# Patient Record
Sex: MALE | Race: WHITE | NOT HISPANIC OR LATINO | Employment: OTHER | URBAN - METROPOLITAN AREA
[De-identification: names, ages, dates, MRNs, and addresses within clinical notes are randomized per-mention and may not be internally consistent; named-entity substitution may affect disease eponyms.]

---

## 2017-10-04 ENCOUNTER — TELEPHONE (OUTPATIENT)
Dept: FAMILY MEDICINE CLINIC | Facility: CLINIC | Age: 77
End: 2017-10-04

## 2017-10-04 RX ORDER — LISINOPRIL AND HYDROCHLOROTHIAZIDE 20; 12.5 MG/1; MG/1
1 TABLET ORAL DAILY
Qty: 90 TABLET | Refills: 3 | Status: SHIPPED | OUTPATIENT
Start: 2017-10-04 | End: 2018-10-01 | Stop reason: SDUPTHER

## 2017-10-04 NOTE — TELEPHONE ENCOUNTER
----- Message from Regine Paulson sent at 10/4/2017  9:18 AM EDT -----  Pt sees dr. GONZALEZ    Pt has appt with him next week but will be 4 days short on one of his meds.  Can he please get a new rx for lisinopril-hydrochlorothiazide (PRINZIDE,ZESTORETIC) 20-12.5 MG per tablet 90 tablet 3 10/4/2016      Sig - Route: Take 1 tablet by mouth Daily. - Oral    [facility] to [facility] [address].  thanks

## 2017-10-12 ENCOUNTER — OFFICE VISIT (OUTPATIENT)
Dept: FAMILY MEDICINE CLINIC | Facility: CLINIC | Age: 77
End: 2017-10-12

## 2017-10-12 ENCOUNTER — APPOINTMENT (OUTPATIENT)
Dept: LAB | Facility: HOSPITAL | Age: 77
End: 2017-10-12

## 2017-10-12 VITALS
BODY MASS INDEX: 32.87 KG/M2 | SYSTOLIC BLOOD PRESSURE: 130 MMHG | WEIGHT: 234.8 LBS | OXYGEN SATURATION: 98 % | HEART RATE: 70 BPM | HEIGHT: 71 IN | DIASTOLIC BLOOD PRESSURE: 90 MMHG

## 2017-10-12 DIAGNOSIS — K21.9 GASTROESOPHAGEAL REFLUX DISEASE, ESOPHAGITIS PRESENCE NOT SPECIFIED: ICD-10-CM

## 2017-10-12 DIAGNOSIS — Z12.5 PROSTATE CANCER SCREENING: ICD-10-CM

## 2017-10-12 DIAGNOSIS — R73.09 ELEVATED GLUCOSE LEVEL: ICD-10-CM

## 2017-10-12 DIAGNOSIS — M88.9 PAGET'S DISEASE OF BONE: ICD-10-CM

## 2017-10-12 DIAGNOSIS — E03.8 OTHER SPECIFIED HYPOTHYROIDISM: ICD-10-CM

## 2017-10-12 DIAGNOSIS — I10 ESSENTIAL HYPERTENSION, BENIGN: ICD-10-CM

## 2017-10-12 DIAGNOSIS — M15.9 GENERALIZED OSTEOARTHROSIS, INVOLVING MULTIPLE SITES: ICD-10-CM

## 2017-10-12 DIAGNOSIS — E78.5 HYPERLIPIDEMIA, UNSPECIFIED HYPERLIPIDEMIA TYPE: Primary | ICD-10-CM

## 2017-10-12 LAB
ALBUMIN SERPL-MCNC: 4.7 G/DL (ref 3.2–4.8)
ALBUMIN/GLOB SERPL: 1.7 G/DL (ref 1.5–2.5)
ALP SERPL-CCNC: 77 U/L (ref 25–100)
ALT SERPL W P-5'-P-CCNC: 38 U/L (ref 7–40)
ANION GAP SERPL CALCULATED.3IONS-SCNC: 6 MMOL/L (ref 3–11)
ARTICHOKE IGE QN: 185 MG/DL (ref 0–130)
AST SERPL-CCNC: 25 U/L (ref 0–33)
BILIRUB SERPL-MCNC: 0.8 MG/DL (ref 0.3–1.2)
BUN BLD-MCNC: 17 MG/DL (ref 9–23)
BUN/CREAT SERPL: 17 (ref 7–25)
CALCIUM SPEC-SCNC: 9.6 MG/DL (ref 8.7–10.4)
CHLORIDE SERPL-SCNC: 96 MMOL/L (ref 99–109)
CHOLEST SERPL-MCNC: 227 MG/DL (ref 0–200)
CO2 SERPL-SCNC: 36 MMOL/L (ref 20–31)
CREAT BLD-MCNC: 1 MG/DL (ref 0.6–1.3)
GFR SERPL CREATININE-BSD FRML MDRD: 72 ML/MIN/1.73
GLOBULIN UR ELPH-MCNC: 2.8 GM/DL
GLUCOSE BLD-MCNC: 149 MG/DL (ref 70–100)
HBA1C MFR BLD: 8.1 % (ref 4.8–5.6)
HDLC SERPL-MCNC: 42 MG/DL (ref 40–60)
POTASSIUM BLD-SCNC: 4.9 MMOL/L (ref 3.5–5.5)
PROT SERPL-MCNC: 7.5 G/DL (ref 5.7–8.2)
PSA SERPL-MCNC: 6.34 NG/ML (ref 0–4)
SODIUM BLD-SCNC: 138 MMOL/L (ref 132–146)
T4 FREE SERPL-MCNC: 2.1 NG/DL (ref 0.89–1.76)
TRIGL SERPL-MCNC: 186 MG/DL (ref 0–150)
TSH SERPL DL<=0.05 MIU/L-ACNC: 0.76 MIU/ML (ref 0.35–5.35)

## 2017-10-12 PROCEDURE — 83036 HEMOGLOBIN GLYCOSYLATED A1C: CPT | Performed by: FAMILY MEDICINE

## 2017-10-12 PROCEDURE — 99214 OFFICE O/P EST MOD 30 MIN: CPT | Performed by: FAMILY MEDICINE

## 2017-10-12 PROCEDURE — 84439 ASSAY OF FREE THYROXINE: CPT | Performed by: FAMILY MEDICINE

## 2017-10-12 PROCEDURE — 84443 ASSAY THYROID STIM HORMONE: CPT | Performed by: FAMILY MEDICINE

## 2017-10-12 PROCEDURE — G0103 PSA SCREENING: HCPCS | Performed by: FAMILY MEDICINE

## 2017-10-12 PROCEDURE — 80053 COMPREHEN METABOLIC PANEL: CPT | Performed by: FAMILY MEDICINE

## 2017-10-12 PROCEDURE — 80061 LIPID PANEL: CPT | Performed by: FAMILY MEDICINE

## 2017-10-12 PROCEDURE — 36415 COLL VENOUS BLD VENIPUNCTURE: CPT | Performed by: FAMILY MEDICINE

## 2017-10-12 RX ORDER — LEVOTHYROXINE SODIUM 0.15 MG/1
150 TABLET ORAL DAILY
Qty: 90 TABLET | Refills: 3 | Status: SHIPPED | OUTPATIENT
Start: 2017-10-12 | End: 2018-10-01 | Stop reason: SDUPTHER

## 2017-10-12 RX ORDER — PROPRANOLOL HYDROCHLORIDE 80 MG/1
80 CAPSULE, EXTENDED RELEASE ORAL DAILY
Qty: 90 CAPSULE | Refills: 3 | Status: SHIPPED | OUTPATIENT
Start: 2017-10-12 | End: 2018-10-01 | Stop reason: SDUPTHER

## 2017-10-12 RX ORDER — OMEPRAZOLE 40 MG/1
40 CAPSULE, DELAYED RELEASE ORAL DAILY
Qty: 90 CAPSULE | Refills: 3 | Status: SHIPPED | OUTPATIENT
Start: 2017-10-12 | End: 2018-10-01 | Stop reason: SDUPTHER

## 2017-10-12 NOTE — PROGRESS NOTES
Subjective   Vinnie Fulton is a 77 y.o. male    HPI Comments: Patient presents today for recheck regarding hypertension, hyperlipidemia, GERD, hypothyroidism, impaired glucose tolerance and Paget's disease of the bone.  He is compliant with his medications and reports no adverse effects associated with the medicines.  He is fasting for laboratory studies today.  He and his wife are leaving in a few weeks to spend the winter in [place].  He reports that he generally feels well and is able to do whatever he wants to do.  His son and daughter-in-law just had a new baby.    Hypertension   Pertinent negatives include no chest pain, headaches, palpitations or shortness of breath.   Hyperlipidemia   Pertinent negatives include no chest pain, myalgias or shortness of breath.   Heartburn   He reports no abdominal pain, no chest pain, no coughing, no nausea or no wheezing. Pertinent negatives include no fatigue.   Hyperthyroidism   Pertinent negatives include no abdominal pain, arthralgias, chest pain, chills, coughing, fatigue, fever, headaches, myalgias, nausea, rash, vomiting or weakness.       The following portions of the patient's history were reviewed and updated as appropriate: allergies, current medications, past social history and problem list    Review of Systems   Constitutional: Negative for appetite change, chills, fatigue, fever and unexpected weight change.   Eyes: Negative for visual disturbance.   Respiratory: Negative for cough, chest tightness, shortness of breath and wheezing.    Cardiovascular: Negative for chest pain, palpitations and leg swelling.   Gastrointestinal: Negative for abdominal distention, abdominal pain, constipation, diarrhea, nausea and vomiting.        Patient experiencing heartburn/acid reflux     Endocrine: Negative for cold intolerance, heat intolerance, polydipsia, polyphagia and polyuria.   Genitourinary: Negative for dysuria, frequency and urgency.   Musculoskeletal: Negative for  arthralgias, back pain and myalgias.   Skin: Negative for color change and rash.   Neurological: Negative for dizziness, tremors, syncope, weakness and headaches.   Hematological: Negative for adenopathy. Does not bruise/bleed easily.   Psychiatric/Behavioral: Negative for agitation. The patient is not nervous/anxious.        Objective     Vitals:    10/12/17 1335   BP: 130/90   Pulse: 70   SpO2: 98%       Physical Exam   Constitutional: He is oriented to person, place, and time. He appears well-developed and well-nourished.   HENT:   Head: Normocephalic.   Mouth/Throat: Oropharynx is clear and moist.   No Exopthalmos   Eyes: Conjunctivae are normal. Pupils are equal, round, and reactive to light.   Neck: Neck supple. No JVD present. No thyromegaly present.   Cardiovascular: Normal rate, regular rhythm, normal heart sounds, intact distal pulses and normal pulses.    No murmur heard.  Pulmonary/Chest: Effort normal and breath sounds normal. No respiratory distress.   Abdominal: Soft. Bowel sounds are normal. There is no hepatosplenomegaly. There is no tenderness.   Musculoskeletal: He exhibits no edema.   Lymphadenopathy:     He has no cervical adenopathy.   Neurological: He is alert and oriented to person, place, and time.   Skin: Skin is warm and dry. No rash noted.   Psychiatric: He has a normal mood and affect. His behavior is normal.   Nursing note and vitals reviewed.      Assessment/Plan   Problem List Items Addressed This Visit        Cardiovascular and Mediastinum    Essential hypertension, benign    Relevant Medications    propranolol LA (INDERAL LA) 80 MG 24 hr capsule    Other Relevant Orders    Comprehensive Metabolic Panel    Hyperlipidemia - Primary    Relevant Orders    Comprehensive Metabolic Panel    Lipid Panel       Digestive    Esophageal reflux    Relevant Medications    omeprazole (priLOSEC) 40 MG capsule       Endocrine    Hypothyroidism    Relevant Medications    levothyroxine (SYNTHROID,  LEVOTHROID) 150 MCG tablet    propranolol LA (INDERAL LA) 80 MG 24 hr capsule    Other Relevant Orders    TSH    T4, Free       Musculoskeletal and Integument    Paget's disease of bone    Relevant Orders    Comprehensive Metabolic Panel    Generalized osteoarthrosis, involving multiple sites      Other Visit Diagnoses     Elevated glucose level        Relevant Orders    Comprehensive Metabolic Panel    Hemoglobin A1c    Prostate cancer screening        Relevant Orders    PSA Screen

## 2017-11-19 RX ORDER — METFORMIN HYDROCHLORIDE EXTENDED-RELEASE TABLETS 1000 MG/1
1000 TABLET, FILM COATED, EXTENDED RELEASE ORAL
Qty: 90 TABLET | Refills: 3 | Status: SHIPPED | OUTPATIENT
Start: 2017-11-19 | End: 2018-10-01

## 2018-10-01 ENCOUNTER — LAB (OUTPATIENT)
Dept: LAB | Facility: HOSPITAL | Age: 78
End: 2018-10-01

## 2018-10-01 ENCOUNTER — OFFICE VISIT (OUTPATIENT)
Dept: FAMILY MEDICINE CLINIC | Facility: CLINIC | Age: 78
End: 2018-10-01

## 2018-10-01 VITALS
SYSTOLIC BLOOD PRESSURE: 130 MMHG | TEMPERATURE: 98.1 F | BODY MASS INDEX: 32.9 KG/M2 | DIASTOLIC BLOOD PRESSURE: 98 MMHG | HEIGHT: 71 IN | WEIGHT: 235 LBS | HEART RATE: 59 BPM | OXYGEN SATURATION: 96 %

## 2018-10-01 DIAGNOSIS — E78.5 HYPERLIPIDEMIA, UNSPECIFIED HYPERLIPIDEMIA TYPE: ICD-10-CM

## 2018-10-01 DIAGNOSIS — Z12.5 PROSTATE CANCER SCREENING: ICD-10-CM

## 2018-10-01 DIAGNOSIS — K21.9 GASTROESOPHAGEAL REFLUX DISEASE, ESOPHAGITIS PRESENCE NOT SPECIFIED: ICD-10-CM

## 2018-10-01 DIAGNOSIS — R73.09 ELEVATED GLUCOSE LEVEL: ICD-10-CM

## 2018-10-01 DIAGNOSIS — M88.9 PAGET'S DISEASE OF BONE: ICD-10-CM

## 2018-10-01 DIAGNOSIS — M15.9 GENERALIZED OSTEOARTHROSIS, INVOLVING MULTIPLE SITES: ICD-10-CM

## 2018-10-01 DIAGNOSIS — I10 ESSENTIAL HYPERTENSION, BENIGN: ICD-10-CM

## 2018-10-01 DIAGNOSIS — Z51.81 MEDICATION MONITORING ENCOUNTER: ICD-10-CM

## 2018-10-01 DIAGNOSIS — E03.8 OTHER SPECIFIED HYPOTHYROIDISM: ICD-10-CM

## 2018-10-01 DIAGNOSIS — Z00.00 MEDICARE ANNUAL WELLNESS VISIT, SUBSEQUENT: Primary | ICD-10-CM

## 2018-10-01 LAB
ALBUMIN SERPL-MCNC: 4.74 G/DL (ref 3.2–4.8)
ALBUMIN/GLOB SERPL: 2.1 G/DL (ref 1.5–2.5)
ALP SERPL-CCNC: 67 U/L (ref 25–100)
ALT SERPL W P-5'-P-CCNC: 30 U/L (ref 7–40)
ANION GAP SERPL CALCULATED.3IONS-SCNC: 9 MMOL/L (ref 3–11)
ARTICHOKE IGE QN: 174 MG/DL (ref 0–130)
AST SERPL-CCNC: 28 U/L (ref 0–33)
BILIRUB SERPL-MCNC: 0.8 MG/DL (ref 0.3–1.2)
BUN BLD-MCNC: 19 MG/DL (ref 9–23)
BUN/CREAT SERPL: 17.8 (ref 7–25)
CALCIUM SPEC-SCNC: 9.6 MG/DL (ref 8.7–10.4)
CHLORIDE SERPL-SCNC: 96 MMOL/L (ref 99–109)
CHOLEST SERPL-MCNC: 222 MG/DL (ref 0–200)
CO2 SERPL-SCNC: 34 MMOL/L (ref 20–31)
CREAT BLD-MCNC: 1.07 MG/DL (ref 0.6–1.3)
DEPRECATED RDW RBC AUTO: 43.6 FL (ref 37–54)
ERYTHROCYTE [DISTWIDTH] IN BLOOD BY AUTOMATED COUNT: 13 % (ref 11.3–14.5)
GFR SERPL CREATININE-BSD FRML MDRD: 67 ML/MIN/1.73
GLOBULIN UR ELPH-MCNC: 2.3 GM/DL
GLUCOSE BLD-MCNC: 168 MG/DL (ref 70–100)
HBA1C MFR BLD: 8 % (ref 4.8–5.6)
HCT VFR BLD AUTO: 45.8 % (ref 38.9–50.9)
HDLC SERPL-MCNC: 40 MG/DL (ref 40–60)
HGB BLD-MCNC: 15.4 G/DL (ref 13.1–17.5)
MCH RBC QN AUTO: 31 PG (ref 27–31)
MCHC RBC AUTO-ENTMCNC: 33.6 G/DL (ref 32–36)
MCV RBC AUTO: 92.3 FL (ref 80–99)
PLATELET # BLD AUTO: 189 10*3/MM3 (ref 150–450)
PMV BLD AUTO: 11.5 FL (ref 6–12)
POTASSIUM BLD-SCNC: 4.7 MMOL/L (ref 3.5–5.5)
PROT SERPL-MCNC: 7 G/DL (ref 5.7–8.2)
PSA SERPL-MCNC: 6.34 NG/ML (ref 0–4)
RBC # BLD AUTO: 4.96 10*6/MM3 (ref 4.2–5.76)
SODIUM BLD-SCNC: 139 MMOL/L (ref 132–146)
T4 FREE SERPL-MCNC: 1.67 NG/DL (ref 0.89–1.76)
TRIGL SERPL-MCNC: 208 MG/DL (ref 0–150)
TSH SERPL DL<=0.05 MIU/L-ACNC: 0.79 MIU/ML (ref 0.35–5.35)
WBC NRBC COR # BLD: 8.31 10*3/MM3 (ref 3.5–10.8)

## 2018-10-01 PROCEDURE — 84443 ASSAY THYROID STIM HORMONE: CPT

## 2018-10-01 PROCEDURE — 80053 COMPREHEN METABOLIC PANEL: CPT

## 2018-10-01 PROCEDURE — 36415 COLL VENOUS BLD VENIPUNCTURE: CPT

## 2018-10-01 PROCEDURE — 84439 ASSAY OF FREE THYROXINE: CPT

## 2018-10-01 PROCEDURE — G0103 PSA SCREENING: HCPCS

## 2018-10-01 PROCEDURE — 83036 HEMOGLOBIN GLYCOSYLATED A1C: CPT

## 2018-10-01 PROCEDURE — 80061 LIPID PANEL: CPT

## 2018-10-01 PROCEDURE — G0439 PPPS, SUBSEQ VISIT: HCPCS | Performed by: FAMILY MEDICINE

## 2018-10-01 PROCEDURE — 85027 COMPLETE CBC AUTOMATED: CPT

## 2018-10-01 RX ORDER — OMEPRAZOLE 40 MG/1
40 CAPSULE, DELAYED RELEASE ORAL DAILY
Qty: 90 CAPSULE | Refills: 3 | Status: SHIPPED | OUTPATIENT
Start: 2018-10-01 | End: 2019-09-20 | Stop reason: SDUPTHER

## 2018-10-01 RX ORDER — LEVOTHYROXINE SODIUM 0.15 MG/1
150 TABLET ORAL DAILY
Qty: 90 TABLET | Refills: 3 | Status: SHIPPED | OUTPATIENT
Start: 2018-10-01 | End: 2019-09-20 | Stop reason: SDUPTHER

## 2018-10-01 RX ORDER — HEPATITIS A VACCINE 1440 [IU]/ML
INJECTION, SUSPENSION INTRAMUSCULAR
Refills: 0 | COMMUNITY
Start: 2018-09-12 | End: 2021-10-22

## 2018-10-01 RX ORDER — METFORMIN HYDROCHLORIDE 500 MG/1
1000 TABLET, EXTENDED RELEASE ORAL
Qty: 180 TABLET | Refills: 3 | Status: SHIPPED | OUTPATIENT
Start: 2018-10-01 | End: 2019-09-22 | Stop reason: SDUPTHER

## 2018-10-01 RX ORDER — METFORMIN HYDROCHLORIDE 500 MG/1
TABLET, EXTENDED RELEASE ORAL
Refills: 1 | COMMUNITY
Start: 2018-08-15 | End: 2018-10-01 | Stop reason: SDUPTHER

## 2018-10-01 RX ORDER — PROPRANOLOL HYDROCHLORIDE 80 MG/1
80 CAPSULE, EXTENDED RELEASE ORAL DAILY
Qty: 90 CAPSULE | Refills: 3 | Status: SHIPPED | OUTPATIENT
Start: 2018-10-01 | End: 2019-09-20 | Stop reason: SDUPTHER

## 2018-10-01 RX ORDER — LISINOPRIL AND HYDROCHLOROTHIAZIDE 20; 12.5 MG/1; MG/1
1 TABLET ORAL DAILY
Qty: 90 TABLET | Refills: 3 | Status: SHIPPED | OUTPATIENT
Start: 2018-10-01 | End: 2019-09-20 | Stop reason: SDUPTHER

## 2018-10-02 NOTE — PROGRESS NOTES
QUICK REFERENCE INFORMATION:  The ABCs of the Annual Wellness Visit    Subsequent Medicare Wellness Visit    HEALTH RISK ASSESSMENT    1940    Recent Hospitalizations:  No hospitalization(s) within the last year..        Current Medical Providers:  Patient Care Team:  Ivan Yoder MD as PCP - General  Ivan Yoder MD as PCP - Family Medicine  Ivan Yoder MD as PCP - Claims Attributed        Smoking Status:  History   Smoking Status   • Former Smoker   Smokeless Tobacco   • Former User       Alcohol Consumption:  History   Alcohol Use No       Depression Screen:   PHQ-2/PHQ-9 Depression Screening 10/1/2018   Little interest or pleasure in doing things 0   Feeling down, depressed, or hopeless 0   Total Score 0       Health Habits and Functional and Cognitive Screening:  Functional & Cognitive Status 10/1/2018   Do you have difficulty preparing food and eating? No   Do you have difficulty bathing yourself, getting dressed or grooming yourself? No   Do you have difficulty using the toilet? No   Do you have difficulty moving around from place to place? No   Do you have trouble with steps or getting out of a bed or a chair? No   In the past year have you fallen or experienced a near fall? No   Current Diet Well Balanced Diet   Dental Exam Not up to date   Eye Exam Up to date   Exercise (times per week) 3 times per week   Current Exercise Activities Include Walking   Do you need help using the phone?  No   Are you deaf or do you have serious difficulty hearing?  (No Data)   Do you need help with transportation? No   Do you need help shopping? No   Do you need help preparing meals?  No   Do you need help with housework?  No   Do you need help with laundry? No   Do you need help taking your medications? No   Do you need help managing money? No   Do you ever drive or ride in a car without wearing a seat belt? No           Does the patient have evidence of cognitive  impairment? No    Aspirin use counseling: Taking ASA appropriately as indicated      Recent Lab Results:  CMP:  Lab Results   Component Value Date    BUN 19 10/01/2018    CREATININE 1.07 10/01/2018    EGFRIFNONA 67 10/01/2018    BCR 17.8 10/01/2018     10/01/2018    K 4.7 10/01/2018    CO2 34.0 (H) 10/01/2018    CALCIUM 9.6 10/01/2018    ALBUMIN 4.74 10/01/2018    BILITOT 0.8 10/01/2018    ALKPHOS 67 10/01/2018    AST 28 10/01/2018    ALT 30 10/01/2018     Lipid Panel:  Lab Results   Component Value Date    CHOL 222 (H) 10/01/2018    TRIG 208 (H) 10/01/2018    HDL 40 10/01/2018     HbA1c:  Lab Results   Component Value Date    HGBA1C 8.00 (H) 10/01/2018       Visual Acuity:  No exam data present    Age-appropriate Screening Schedule:  Refer to the list below for future screening recommendations based on patient's age, sex and/or medical conditions. Orders for these recommended tests are listed in the plan section. The patient has been provided with a written plan.    Health Maintenance   Topic Date Due   • TDAP/TD VACCINES (1 - Tdap) 07/10/1959   • ZOSTER VACCINE (1 of 2) 07/10/1990   • PNEUMOCOCCAL VACCINES (65+ LOW/MEDIUM RISK) (1 of 2 - PCV13) 07/10/2005   • INFLUENZA VACCINE  08/01/2018   • LIPID PANEL  10/12/2018        Subjective   History of Present Illness    Vinnie Fulton is a 78 y.o. male who presents for an Subsequent Wellness Visit.    The following portions of the patient's history were reviewed and updated as appropriate: allergies, current medications, past family history, past medical history, past social history, past surgical history and problem list.    Outpatient Medications Prior to Visit   Medication Sig Dispense Refill   • ascorbic acid (VITAMIN C) 1000 MG tablet Take 1 tablet by mouth Daily.  1   • aspirin 81 MG EC tablet Take 81 mg by mouth daily.     • levothyroxine (SYNTHROID, LEVOTHROID) 150 MCG tablet Take 1 tablet by mouth Daily. 90 tablet 3   • lisinopril-hydrochlorothiazide  "(PRINZIDE,ZESTORETIC) 20-12.5 MG per tablet Take 1 tablet by mouth Daily. 90 tablet 3   • omeprazole (priLOSEC) 40 MG capsule Take 1 capsule by mouth Daily. 90 capsule 3   • propranolol LA (INDERAL LA) 80 MG 24 hr capsule Take 1 capsule by mouth Daily. 90 capsule 3   • metFORMIN (FORTAMET) 1000 MG (OSM) 24 hr tablet Take 1 tablet by mouth Daily With Breakfast. 90 tablet 3     No facility-administered medications prior to visit.        Patient Active Problem List   Diagnosis   • Paget's disease of bone   • Esophageal reflux   • Essential hypertension, benign   • Allergic rhinitis   • Generalized osteoarthrosis, involving multiple sites   • Hyperlipidemia   • Hypothyroidism   • Herpes zoster   • Cellulitis and abscess       Advance Care Planning:  has an advance directive - a copy HAS NOT been provided. Have asked the patient to send this to us to add to record.    Identification of Risk Factors:  Risk factors include: cardiovascular risk.    Review of Systems    Compared to one year ago, the patient feels his physical health is the same.  Compared to one year ago, the patient feels his mental health is the same.    Objective     Physical Exam     LEEROY: right side of prostate quite firm compared to left. No distinct nodules or masses.    Vitals:    10/01/18 0913   BP: 130/98   Pulse: 59   Temp: 98.1 °F (36.7 °C)   SpO2: 96%   Weight: 107 kg (235 lb)   Height: 180.3 cm (70.98\")       Patient's Body mass index is 32.79 kg/m². BMI is above normal parameters. Recommendations include: nutrition counseling.      Assessment/Plan   Patient Self-Management and Personalized Health Advice  The patient has been provided with information about: diet, weight management and prevention of cardiac or vascular disease and preventive services including:   · Counseling for cardiovascular disease risk reduction, Nutrition counseling provided.    Visit Diagnoses:    ICD-10-CM ICD-9-CM   1. Medicare annual wellness visit, subsequent Z00.00 " V70.0   2. Prostate cancer screening Z12.5 V76.44   3. Hyperlipidemia, unspecified hyperlipidemia type E78.5 272.4   4. Generalized osteoarthrosis, involving multiple sites M15.9 715.09   5. Paget's disease of bone M88.9 731.0   6. Other specified hypothyroidism E03.8 244.8   7. Essential hypertension, benign I10 401.1   8. Gastroesophageal reflux disease, esophagitis presence not specified K21.9 530.81   9. Medication monitoring encounter Z51.81 V58.83   10. Elevated glucose level R73.09 790.29       Orders Placed This Encounter   Procedures   • CBC (No Diff)     Standing Status:   Future     Number of Occurrences:   1     Standing Expiration Date:   10/1/2019   • Comprehensive Metabolic Panel     Standing Status:   Future     Number of Occurrences:   1     Standing Expiration Date:   10/1/2019   • Hemoglobin A1c     Standing Status:   Future     Number of Occurrences:   1     Standing Expiration Date:   10/1/2019   • Lipid Panel     Standing Status:   Future     Number of Occurrences:   1     Standing Expiration Date:   10/1/2019   • PSA Screen     Standing Status:   Future     Number of Occurrences:   1     Standing Expiration Date:   10/1/2019   • TSH     Standing Status:   Future     Number of Occurrences:   1     Standing Expiration Date:   10/1/2019   • T4, Free     Standing Status:   Future     Number of Occurrences:   1     Standing Expiration Date:   10/1/2019       Outpatient Encounter Prescriptions as of 10/1/2018   Medication Sig Dispense Refill   • ascorbic acid (VITAMIN C) 1000 MG tablet Take 1 tablet by mouth Daily.  1   • aspirin 81 MG EC tablet Take 81 mg by mouth daily.     • HAVRIX 1440 EL U/ML vaccine ADM 1ML IM UTD  0   • levothyroxine (SYNTHROID, LEVOTHROID) 150 MCG tablet Take 1 tablet by mouth Daily. 90 tablet 3   • lisinopril-hydrochlorothiazide (PRINZIDE,ZESTORETIC) 20-12.5 MG per tablet Take 1 tablet by mouth Daily. 90 tablet 3   • metFORMIN ER (GLUCOPHAGE-XR) 500 MG 24 hr tablet TK 2 TS  PO D WITH BREAKFAST  1   • omeprazole (priLOSEC) 40 MG capsule Take 1 capsule by mouth Daily. 90 capsule 3   • propranolol LA (INDERAL LA) 80 MG 24 hr capsule Take 1 capsule by mouth Daily. 90 capsule 3   • [DISCONTINUED] metFORMIN (FORTAMET) 1000 MG (OSM) 24 hr tablet Take 1 tablet by mouth Daily With Breakfast. 90 tablet 3     No facility-administered encounter medications on file as of 10/1/2018.        Reviewed use of high risk medication in the elderly: not applicable  Reviewed for potential of harmful drug interactions in the elderly: not applicable    Follow Up:  Return in about 6 months (around 4/1/2019) for Recheck.     An After Visit Summary and PPPS with all of these plans were given to the patient.

## 2019-09-20 ENCOUNTER — TELEPHONE (OUTPATIENT)
Dept: FAMILY MEDICINE CLINIC | Facility: CLINIC | Age: 79
End: 2019-09-20

## 2019-09-20 RX ORDER — LEVOTHYROXINE SODIUM 0.15 MG/1
150 TABLET ORAL DAILY
Qty: 30 TABLET | Refills: 0 | Status: SHIPPED | OUTPATIENT
Start: 2019-09-20 | End: 2019-09-20 | Stop reason: SDUPTHER

## 2019-09-20 RX ORDER — LISINOPRIL AND HYDROCHLOROTHIAZIDE 20; 12.5 MG/1; MG/1
1 TABLET ORAL DAILY
Qty: 30 TABLET | Refills: 0 | Status: SHIPPED | OUTPATIENT
Start: 2019-09-20 | End: 2019-09-20 | Stop reason: SDUPTHER

## 2019-09-20 RX ORDER — OMEPRAZOLE 40 MG/1
40 CAPSULE, DELAYED RELEASE ORAL DAILY
Qty: 30 CAPSULE | Refills: 0 | Status: SHIPPED | OUTPATIENT
Start: 2019-09-20 | End: 2019-09-20 | Stop reason: SDUPTHER

## 2019-09-20 RX ORDER — PROPRANOLOL HYDROCHLORIDE 80 MG/1
80 CAPSULE, EXTENDED RELEASE ORAL DAILY
Qty: 30 CAPSULE | Refills: 0 | Status: SHIPPED | OUTPATIENT
Start: 2019-09-20 | End: 2019-09-20 | Stop reason: SDUPTHER

## 2019-09-20 NOTE — TELEPHONE ENCOUNTER
----- Message from Segundo Wheatley sent at 9/20/2019  1:06 PM EDT -----  Contact: PATIENT  PATIENT WANTS REFILLS ON THE FOLLOWING 4 MEDS:    OMEPRAZOLE  LISINOPRIL  PROPRANOLOL  [facility]    Pharmacy     [id number] [place], [place] - [address] AT [facility] [phone] [facility] [phone] FX

## 2019-09-25 RX ORDER — LEVOTHYROXINE SODIUM 0.15 MG/1
150 TABLET ORAL DAILY
Qty: 90 TABLET | Refills: 0 | Status: SHIPPED | OUTPATIENT
Start: 2019-09-25 | End: 2019-10-29

## 2019-09-25 RX ORDER — LISINOPRIL AND HYDROCHLOROTHIAZIDE 20; 12.5 MG/1; MG/1
1 TABLET ORAL DAILY
Qty: 90 TABLET | Refills: 0 | Status: SHIPPED | OUTPATIENT
Start: 2019-09-25 | End: 2019-10-29

## 2019-09-25 RX ORDER — PROPRANOLOL HYDROCHLORIDE 80 MG/1
80 CAPSULE, EXTENDED RELEASE ORAL DAILY
Qty: 90 CAPSULE | Refills: 0 | Status: SHIPPED | OUTPATIENT
Start: 2019-09-25 | End: 2019-10-29

## 2019-09-25 RX ORDER — OMEPRAZOLE 40 MG/1
40 CAPSULE, DELAYED RELEASE ORAL DAILY
Qty: 90 CAPSULE | Refills: 0 | Status: SHIPPED | OUTPATIENT
Start: 2019-09-25 | End: 2019-10-29

## 2019-09-25 RX ORDER — METFORMIN HYDROCHLORIDE 500 MG/1
1000 TABLET, EXTENDED RELEASE ORAL
Qty: 180 TABLET | Refills: 0 | Status: SHIPPED | OUTPATIENT
Start: 2019-09-25 | End: 2019-10-29 | Stop reason: SDUPTHER

## 2019-09-27 RX ORDER — OMEPRAZOLE 40 MG/1
40 CAPSULE, DELAYED RELEASE ORAL DAILY
Qty: 90 CAPSULE | Refills: 0 | Status: SHIPPED | OUTPATIENT
Start: 2019-09-27 | End: 2019-10-29 | Stop reason: SDUPTHER

## 2019-09-27 RX ORDER — LISINOPRIL AND HYDROCHLOROTHIAZIDE 20; 12.5 MG/1; MG/1
1 TABLET ORAL DAILY
Qty: 90 TABLET | Refills: 0 | Status: SHIPPED | OUTPATIENT
Start: 2019-09-27 | End: 2019-10-29 | Stop reason: SDUPTHER

## 2019-09-27 RX ORDER — LEVOTHYROXINE SODIUM 0.15 MG/1
150 TABLET ORAL DAILY
Qty: 90 TABLET | Refills: 0 | Status: SHIPPED | OUTPATIENT
Start: 2019-09-27 | End: 2019-10-29 | Stop reason: SDUPTHER

## 2019-09-27 RX ORDER — PROPRANOLOL HYDROCHLORIDE 80 MG/1
80 CAPSULE, EXTENDED RELEASE ORAL DAILY
Qty: 90 CAPSULE | Refills: 0 | Status: SHIPPED | OUTPATIENT
Start: 2019-09-27 | End: 2019-10-29 | Stop reason: SDUPTHER

## 2019-10-25 RX ORDER — OMEPRAZOLE 40 MG/1
40 CAPSULE, DELAYED RELEASE ORAL DAILY
Qty: 30 CAPSULE | Refills: 0 | Status: SHIPPED | OUTPATIENT
Start: 2019-10-25 | End: 2019-10-29

## 2019-10-25 RX ORDER — LEVOTHYROXINE SODIUM 0.15 MG/1
150 TABLET ORAL DAILY
Qty: 30 TABLET | Refills: 0 | Status: SHIPPED | OUTPATIENT
Start: 2019-10-25 | End: 2019-10-29

## 2019-10-25 RX ORDER — PROPRANOLOL HYDROCHLORIDE 80 MG/1
80 CAPSULE, EXTENDED RELEASE ORAL DAILY
Qty: 30 CAPSULE | Refills: 0 | Status: SHIPPED | OUTPATIENT
Start: 2019-10-25 | End: 2019-10-29

## 2019-10-25 RX ORDER — LISINOPRIL AND HYDROCHLOROTHIAZIDE 20; 12.5 MG/1; MG/1
1 TABLET ORAL DAILY
Qty: 30 TABLET | Refills: 0 | Status: SHIPPED | OUTPATIENT
Start: 2019-10-25 | End: 2019-10-29

## 2019-10-29 ENCOUNTER — OFFICE VISIT (OUTPATIENT)
Dept: FAMILY MEDICINE CLINIC | Facility: CLINIC | Age: 79
End: 2019-10-29

## 2019-10-29 ENCOUNTER — LAB (OUTPATIENT)
Dept: LAB | Facility: HOSPITAL | Age: 79
End: 2019-10-29

## 2019-10-29 VITALS
TEMPERATURE: 97.6 F | OXYGEN SATURATION: 98 % | WEIGHT: 230 LBS | HEART RATE: 77 BPM | SYSTOLIC BLOOD PRESSURE: 128 MMHG | HEIGHT: 73 IN | BODY MASS INDEX: 30.48 KG/M2 | DIASTOLIC BLOOD PRESSURE: 68 MMHG

## 2019-10-29 DIAGNOSIS — Z00.00 MEDICARE ANNUAL WELLNESS VISIT, SUBSEQUENT: Primary | ICD-10-CM

## 2019-10-29 DIAGNOSIS — E78.5 HYPERLIPIDEMIA, UNSPECIFIED HYPERLIPIDEMIA TYPE: ICD-10-CM

## 2019-10-29 DIAGNOSIS — E03.8 OTHER SPECIFIED HYPOTHYROIDISM: ICD-10-CM

## 2019-10-29 DIAGNOSIS — K21.9 GASTROESOPHAGEAL REFLUX DISEASE, ESOPHAGITIS PRESENCE NOT SPECIFIED: ICD-10-CM

## 2019-10-29 DIAGNOSIS — Z51.81 MEDICATION MONITORING ENCOUNTER: ICD-10-CM

## 2019-10-29 DIAGNOSIS — M88.9 PAGET'S DISEASE OF BONE: ICD-10-CM

## 2019-10-29 DIAGNOSIS — I10 ESSENTIAL HYPERTENSION, BENIGN: ICD-10-CM

## 2019-10-29 DIAGNOSIS — Z12.5 PROSTATE CANCER SCREENING: ICD-10-CM

## 2019-10-29 DIAGNOSIS — R73.09 ELEVATED GLUCOSE LEVEL: ICD-10-CM

## 2019-10-29 DIAGNOSIS — M15.9 GENERALIZED OSTEOARTHROSIS, INVOLVING MULTIPLE SITES: ICD-10-CM

## 2019-10-29 LAB
ALBUMIN SERPL-MCNC: 4.4 G/DL (ref 3.5–5.2)
ALBUMIN/GLOB SERPL: 1.3 G/DL
ALP SERPL-CCNC: 67 U/L (ref 39–117)
ALT SERPL W P-5'-P-CCNC: 27 U/L (ref 1–41)
ANION GAP SERPL CALCULATED.3IONS-SCNC: 10.3 MMOL/L (ref 5–15)
AST SERPL-CCNC: 24 U/L (ref 1–40)
BILIRUB SERPL-MCNC: 0.6 MG/DL (ref 0.2–1.2)
BUN BLD-MCNC: 16 MG/DL (ref 8–23)
BUN/CREAT SERPL: 17.6 (ref 7–25)
CALCIUM SPEC-SCNC: 9.1 MG/DL (ref 8.6–10.5)
CHLORIDE SERPL-SCNC: 93 MMOL/L (ref 98–107)
CHOLEST SERPL-MCNC: 201 MG/DL (ref 0–200)
CO2 SERPL-SCNC: 31.7 MMOL/L (ref 22–29)
CREAT BLD-MCNC: 0.91 MG/DL (ref 0.76–1.27)
DEPRECATED RDW RBC AUTO: 45.1 FL (ref 37–54)
ERYTHROCYTE [DISTWIDTH] IN BLOOD BY AUTOMATED COUNT: 13.2 % (ref 12.3–15.4)
GFR SERPL CREATININE-BSD FRML MDRD: 80 ML/MIN/1.73
GLOBULIN UR ELPH-MCNC: 3.3 GM/DL
GLUCOSE BLD-MCNC: 206 MG/DL (ref 65–99)
HBA1C MFR BLD: 8.63 % (ref 4.8–5.6)
HCT VFR BLD AUTO: 45.7 % (ref 37.5–51)
HDLC SERPL-MCNC: 37 MG/DL (ref 40–60)
HGB BLD-MCNC: 15.4 G/DL (ref 13–17.7)
LDLC SERPL CALC-MCNC: 131 MG/DL (ref 0–100)
LDLC/HDLC SERPL: 3.55 {RATIO}
MCH RBC QN AUTO: 31.6 PG (ref 26.6–33)
MCHC RBC AUTO-ENTMCNC: 33.7 G/DL (ref 31.5–35.7)
MCV RBC AUTO: 93.6 FL (ref 79–97)
PLATELET # BLD AUTO: 186 10*3/MM3 (ref 140–450)
PMV BLD AUTO: 11.7 FL (ref 6–12)
POTASSIUM BLD-SCNC: 5.1 MMOL/L (ref 3.5–5.2)
PROT SERPL-MCNC: 7.7 G/DL (ref 6–8.5)
PSA SERPL-MCNC: 8.46 NG/ML (ref 0–4)
RBC # BLD AUTO: 4.88 10*6/MM3 (ref 4.14–5.8)
SODIUM BLD-SCNC: 135 MMOL/L (ref 136–145)
T4 FREE SERPL-MCNC: 2.1 NG/DL (ref 0.93–1.7)
TRIGL SERPL-MCNC: 164 MG/DL (ref 0–150)
TSH SERPL DL<=0.05 MIU/L-ACNC: 0.85 UIU/ML (ref 0.27–4.2)
VLDLC SERPL-MCNC: 32.8 MG/DL (ref 5–40)
WBC NRBC COR # BLD: 8.51 10*3/MM3 (ref 3.4–10.8)

## 2019-10-29 PROCEDURE — 36415 COLL VENOUS BLD VENIPUNCTURE: CPT

## 2019-10-29 PROCEDURE — 84439 ASSAY OF FREE THYROXINE: CPT

## 2019-10-29 PROCEDURE — G0439 PPPS, SUBSEQ VISIT: HCPCS | Performed by: FAMILY MEDICINE

## 2019-10-29 PROCEDURE — 83036 HEMOGLOBIN GLYCOSYLATED A1C: CPT

## 2019-10-29 PROCEDURE — 84443 ASSAY THYROID STIM HORMONE: CPT

## 2019-10-29 PROCEDURE — 85027 COMPLETE CBC AUTOMATED: CPT

## 2019-10-29 PROCEDURE — 80061 LIPID PANEL: CPT

## 2019-10-29 PROCEDURE — G0103 PSA SCREENING: HCPCS

## 2019-10-29 PROCEDURE — 80053 COMPREHEN METABOLIC PANEL: CPT

## 2019-10-29 RX ORDER — METFORMIN HYDROCHLORIDE 500 MG/1
1000 TABLET, EXTENDED RELEASE ORAL
Qty: 180 TABLET | Refills: 3 | Status: SHIPPED | OUTPATIENT
Start: 2019-10-29 | End: 2020-09-21 | Stop reason: SDUPTHER

## 2019-10-29 RX ORDER — OMEPRAZOLE 40 MG/1
40 CAPSULE, DELAYED RELEASE ORAL DAILY
Qty: 90 CAPSULE | Refills: 3 | Status: SHIPPED | OUTPATIENT
Start: 2019-10-29 | End: 2020-09-21 | Stop reason: SDUPTHER

## 2019-10-29 RX ORDER — LEVOTHYROXINE SODIUM 0.15 MG/1
150 TABLET ORAL DAILY
Qty: 90 TABLET | Refills: 3 | Status: SHIPPED | OUTPATIENT
Start: 2019-10-29 | End: 2019-12-06 | Stop reason: SDUPTHER

## 2019-10-29 RX ORDER — LISINOPRIL AND HYDROCHLOROTHIAZIDE 20; 12.5 MG/1; MG/1
1 TABLET ORAL DAILY
Qty: 90 TABLET | Refills: 3 | Status: SHIPPED | OUTPATIENT
Start: 2019-10-29 | End: 2020-09-21 | Stop reason: SDUPTHER

## 2019-10-29 RX ORDER — PROPRANOLOL HYDROCHLORIDE 80 MG/1
80 CAPSULE, EXTENDED RELEASE ORAL DAILY
Qty: 90 CAPSULE | Refills: 3 | Status: SHIPPED | OUTPATIENT
Start: 2019-10-29 | End: 2020-09-21 | Stop reason: SDUPTHER

## 2019-10-29 NOTE — PROGRESS NOTES
The ABCs of the Annual Wellness Visit  Subsequent Medicare Wellness Visit    Chief Complaint   Patient presents with   • Hypertension     refills on propranolol and lisinopril   • Diabetes     refills on metformin   • GI Problem     refills on omeprazole   • Hypothyroidism     refills on levothyroxine       Subjective   History of Present Illness:  Vinnie Fulton is a 79 y.o. male who presents for a Subsequent Medicare Wellness Visit.    HEALTH RISK ASSESSMENT    Recent Hospitalizations:  No hospitalization(s) within the last year.    Current Medical Providers:  Patient Care Team:  Ivan Yoder MD as PCP - General  Ivan Yoder MD as PCP - Family Medicine  Ivan Yoder MD as PCP - Claims Attributed    Smoking Status:  Social History     Tobacco Use   Smoking Status Former Smoker   Smokeless Tobacco Former User       Alcohol Consumption:  Social History     Substance and Sexual Activity   Alcohol Use No       Depression Screen:   PHQ-2/PHQ-9 Depression Screening 10/29/2019   Little interest or pleasure in doing things 0   Feeling down, depressed, or hopeless 0   Total Score 0       Fall Risk Screen:  JOSEADI Fall Risk Assessment was completed, and patient is at LOW risk for falls.Assessment completed on:10/29/2019    Health Habits and Functional and Cognitive Screening:  Functional & Cognitive Status 10/29/2019   Do you have difficulty preparing food and eating? No   Do you have difficulty bathing yourself, getting dressed or grooming yourself? No   Do you have difficulty using the toilet? No   Do you have difficulty moving around from place to place? No   Do you have trouble with steps or getting out of a bed or a chair? No   Current Diet Well Balanced Diet   Dental Exam Up to date   Eye Exam -   Exercise (times per week) 7 times per week   Current Exercise Activities Include Walking   Do you need help using the phone?  No   Are you deaf or do you have serious difficulty  hearing?  No   Do you need help with transportation? Yes   Do you need help shopping? No   Do you need help preparing meals?  No   Do you need help with housework?  No   Do you need help with laundry? No   Do you need help taking your medications? No   Do you need help managing money? No   Do you ever drive or ride in a car without wearing a seat belt? Yes         Does the patient have evidence of cognitive impairment? No    Asprin use counseling:Taking ASA appropriately as indicated    Age-appropriate Screening Schedule:  Refer to the list below for future screening recommendations based on patient's age, sex and/or medical conditions. Orders for these recommended tests are listed in the plan section. The patient has been provided with a written plan.    Health Maintenance   Topic Date Due   • TDAP/TD VACCINES (1 - Tdap) 07/10/1959   • ZOSTER VACCINE (1 of 2) 07/10/1990   • LIPID PANEL  10/29/2020   • INFLUENZA VACCINE  Completed   • PNEUMOCOCCAL VACCINES (65+ LOW/MEDIUM RISK)  Completed          The following portions of the patient's history were reviewed and updated as appropriate: allergies, current medications, past family history, past medical history, past social history, past surgical history and problem list.    Outpatient Medications Prior to Visit   Medication Sig Dispense Refill   • ascorbic acid (VITAMIN C) 1000 MG tablet Take 1 tablet by mouth Daily.  1   • aspirin 81 MG EC tablet Take 81 mg by mouth daily.     • HAVRIX 1440 EL U/ML vaccine ADM 1ML IM UTD  0   • levothyroxine (SYNTHROID, LEVOTHROID) 150 MCG tablet TAKE 1 TABLET BY MOUTH DAILY 90 tablet 0   • levothyroxine (SYNTHROID, LEVOTHROID) 150 MCG tablet TAKE 1 TABLET BY MOUTH DAILY 90 tablet 0   • levothyroxine (SYNTHROID, LEVOTHROID) 150 MCG tablet TAKE 1 TABLET BY MOUTH DAILY 30 tablet 0   • lisinopril-hydrochlorothiazide (PRINZIDE,ZESTORETIC) 20-12.5 MG per tablet TAKE 1 TABLET BY MOUTH DAILY 90 tablet 0   • lisinopril-hydrochlorothiazide  "(PRINZIDE,ZESTORETIC) 20-12.5 MG per tablet TAKE 1 TABLET BY MOUTH DAILY 90 tablet 0   • lisinopril-hydrochlorothiazide (PRINZIDE,ZESTORETIC) 20-12.5 MG per tablet TAKE 1 TABLET BY MOUTH DAILY 30 tablet 0   • metFORMIN ER (GLUCOPHAGE-XR) 500 MG 24 hr tablet TAKE 2 TABLETS BY MOUTH DAILY WITH BREAKFAST 180 tablet 0   • omeprazole (priLOSEC) 40 MG capsule TAKE 1 CAPSULE BY MOUTH DAILY 90 capsule 0   • omeprazole (priLOSEC) 40 MG capsule TAKE 1 CAPSULE BY MOUTH DAILY 90 capsule 0   • omeprazole (priLOSEC) 40 MG capsule TAKE 1 CAPSULE BY MOUTH DAILY 30 capsule 0   • propranolol LA (INDERAL LA) 80 MG 24 hr capsule TAKE 1 CAPSULE BY MOUTH DAILY 90 capsule 0   • propranolol LA (INDERAL LA) 80 MG 24 hr capsule TAKE 1 CAPSULE BY MOUTH DAILY 90 capsule 0   • propranolol LA (INDERAL LA) 80 MG 24 hr capsule TAKE 1 CAPSULE BY MOUTH DAILY 30 capsule 0     No facility-administered medications prior to visit.        Patient Active Problem List   Diagnosis   • Paget's disease of bone   • Esophageal reflux   • Essential hypertension, benign   • Allergic rhinitis   • Generalized osteoarthrosis, involving multiple sites   • Hyperlipidemia   • Hypothyroidism   • Herpes zoster   • Cellulitis and abscess       Advanced Care Planning:  Patient has an advance directive - a copy has not been provided. Have asked the patient to send this to us to add to record    Review of Systems    Compared to one year ago, the patient feels his physical health is the same.  Compared to one year ago, the patient feels his mental health is the same.    Reviewed chart for potential of high risk medication in the elderly: not applicable  Reviewed chart for potential of harmful drug interactions in the elderly:not applicable    Objective         Vitals:    10/29/19 0835   BP: 128/68   Pulse: 77   Temp: 97.6 °F (36.4 °C)   SpO2: 98%   Weight: 104 kg (230 lb)   Height: 185.4 cm (73\")       Body mass index is 30.34 kg/m².  Discussed the patient's BMI with him. The " BMI is above average; BMI management plan is completed.    Physical Exam          Assessment/Plan   Medicare Risks and Personalized Health Plan  CMS Preventative Services Quick Reference  Cardiovascular risk  Immunizations Discussed/Encouraged (specific immunizations; Influenza )  Obesity/Overweight     The above risks/problems have been discussed with the patient.  Pertinent information has been shared with the patient in the After Visit Summary.  Follow up plans and orders are seen below in the Assessment/Plan Section.    Diagnoses and all orders for this visit:    1. Medicare annual wellness visit, subsequent (Primary)    2. Prostate cancer screening  -     PSA Screen; Future    3. Hyperlipidemia, unspecified hyperlipidemia type  -     Comprehensive Metabolic Panel; Future  -     Lipid Panel; Future    4. Generalized osteoarthrosis, involving multiple sites    5. Paget's disease of bone  -     Comprehensive Metabolic Panel; Future    6. Other specified hypothyroidism  -     TSH; Future  -     T4, Free; Future    7. Essential hypertension, benign  -     Comprehensive Metabolic Panel; Future  -     TSH; Future  -     T4, Free; Future    8. Gastroesophageal reflux disease, esophagitis presence not specified  -     Comprehensive Metabolic Panel; Future    9. Medication monitoring encounter  -     CBC (No Diff); Future  -     Comprehensive Metabolic Panel; Future    10. Elevated glucose level  -     Hemoglobin A1c; Future    Other orders  -     levothyroxine (SYNTHROID, LEVOTHROID) 150 MCG tablet; Take 1 tablet by mouth Daily.  Dispense: 90 tablet; Refill: 3  -     lisinopril-hydrochlorothiazide (PRINZIDE,ZESTORETIC) 20-12.5 MG per tablet; Take 1 tablet by mouth Daily.  Dispense: 90 tablet; Refill: 3  -     metFORMIN ER (GLUCOPHAGE-XR) 500 MG 24 hr tablet; Take 2 tablets by mouth Daily With Breakfast.  Dispense: 180 tablet; Refill: 3  -     omeprazole (priLOSEC) 40 MG capsule; Take 1 capsule by mouth Daily.  Dispense:  90 capsule; Refill: 3  -     propranolol LA (INDERAL LA) 80 MG 24 hr capsule; Take 1 capsule by mouth Daily.  Dispense: 90 capsule; Refill: 3      Follow Up:  Return in about 6 months (around 4/29/2020) for Recheck.     An After Visit Summary and PPPS were given to the patient.

## 2019-12-06 RX ORDER — LISINOPRIL AND HYDROCHLOROTHIAZIDE 20; 12.5 MG/1; MG/1
1 TABLET ORAL DAILY
Qty: 30 TABLET | Refills: 0 | OUTPATIENT
Start: 2019-12-06

## 2019-12-06 RX ORDER — LEVOTHYROXINE SODIUM 0.15 MG/1
150 TABLET ORAL DAILY
Qty: 30 TABLET | Refills: 0 | OUTPATIENT
Start: 2019-12-06

## 2019-12-06 RX ORDER — LEVOTHYROXINE SODIUM 0.15 MG/1
150 TABLET ORAL DAILY
Qty: 90 TABLET | Refills: 3 | Status: SHIPPED | OUTPATIENT
Start: 2019-12-06 | End: 2020-09-21 | Stop reason: SDUPTHER

## 2019-12-06 RX ORDER — PROPRANOLOL HYDROCHLORIDE 80 MG/1
80 CAPSULE, EXTENDED RELEASE ORAL DAILY
Qty: 30 CAPSULE | Refills: 0 | OUTPATIENT
Start: 2019-12-06

## 2019-12-06 RX ORDER — OMEPRAZOLE 40 MG/1
40 CAPSULE, DELAYED RELEASE ORAL DAILY
Qty: 30 CAPSULE | Refills: 0 | OUTPATIENT
Start: 2019-12-06

## 2020-02-26 ENCOUNTER — OFFICE VISIT (OUTPATIENT)
Dept: FAMILY MEDICINE CLINIC | Facility: CLINIC | Age: 80
End: 2020-02-26

## 2020-02-26 VITALS
OXYGEN SATURATION: 98 % | HEART RATE: 60 BPM | SYSTOLIC BLOOD PRESSURE: 148 MMHG | WEIGHT: 223 LBS | BODY MASS INDEX: 29.55 KG/M2 | DIASTOLIC BLOOD PRESSURE: 88 MMHG | TEMPERATURE: 98 F | HEIGHT: 73 IN

## 2020-02-26 DIAGNOSIS — E78.5 HYPERLIPIDEMIA, UNSPECIFIED HYPERLIPIDEMIA TYPE: ICD-10-CM

## 2020-02-26 DIAGNOSIS — I10 ESSENTIAL HYPERTENSION, BENIGN: ICD-10-CM

## 2020-02-26 DIAGNOSIS — R97.20 ELEVATED PSA: ICD-10-CM

## 2020-02-26 DIAGNOSIS — E11.9 TYPE 2 DIABETES MELLITUS WITHOUT COMPLICATION, WITHOUT LONG-TERM CURRENT USE OF INSULIN (HCC): Primary | ICD-10-CM

## 2020-02-26 LAB
GLUCOSE BLDC GLUCOMTR-MCNC: 146 MG/DL (ref 70–130)
HBA1C MFR BLD: 6.9 %

## 2020-02-26 PROCEDURE — 82962 GLUCOSE BLOOD TEST: CPT | Performed by: FAMILY MEDICINE

## 2020-02-26 PROCEDURE — 83036 HEMOGLOBIN GLYCOSYLATED A1C: CPT | Performed by: FAMILY MEDICINE

## 2020-02-26 PROCEDURE — 99214 OFFICE O/P EST MOD 30 MIN: CPT | Performed by: FAMILY MEDICINE

## 2020-02-26 RX ORDER — DOXYCYCLINE HYCLATE 100 MG/1
CAPSULE ORAL
Qty: 42 CAPSULE | Refills: 0 | Status: SHIPPED | OUTPATIENT
Start: 2020-02-26 | End: 2020-09-21

## 2020-02-26 NOTE — PROGRESS NOTES
Subjective   Vinnie Fulton is a 79 y.o. male    Chief Complaint    Diabetes mellitus  Elevated PSA  Elevated cholesterol and triglyceride  High blood pressure    History of Present Illness  Patient presents today concerned about lab results after his physical last fall.  His hemoglobin A1c was elevated, cholesterol and triglycerides elevated, and blood pressure slightly elevated.  His PSA is also gone up.  Discussion regarding these abnormalities.  He he has made corrections with his diet and is exercising on a regular basis.  This is reflected in a drop in his hemoglobin A1c down to 6.9% from 8.63.  His weight has gone down 7 pounds.  He is commended for his efforts.  Regarding the increase in his PSA I have recommended a course of antibiotics with a follow-up lab test.  He is agreeable to this.    The following portions of the patient's history were reviewed and updated as appropriate: allergies, current medications, past social history and problem list    Review of Systems   Constitutional: Negative for appetite change, diaphoresis, fatigue and unexpected weight change.   Eyes: Negative for visual disturbance.   Respiratory: Negative for cough, chest tightness and shortness of breath.    Cardiovascular: Negative for chest pain, palpitations and leg swelling.   Gastrointestinal: Negative for diarrhea, nausea and vomiting.   Endocrine: Negative for polydipsia, polyphagia and polyuria.   Genitourinary: Positive for frequency and urgency. Negative for dysuria.   Skin: Negative for color change and rash.   Neurological: Negative for dizziness, syncope, weakness, light-headedness, numbness and headaches.       Objective     Vitals:    02/26/20 0959   BP: 148/88   Pulse: 60   Temp: 98 °F (36.7 °C)   SpO2: 98%       Physical Exam   Constitutional: He is oriented to person, place, and time. He appears well-developed and well-nourished.   HENT:   Head: Normocephalic and atraumatic.   Eyes: Conjunctivae are normal.   Neck:  Neck supple. No JVD present. No thyromegaly present.   Cardiovascular: Normal rate, regular rhythm, normal heart sounds, intact distal pulses and normal pulses.   No murmur heard.  Pulmonary/Chest: Effort normal and breath sounds normal. No respiratory distress.   Abdominal: Soft. Bowel sounds are normal. There is no hepatosplenomegaly. There is no tenderness.   Musculoskeletal: He exhibits no edema, tenderness or deformity.   Lymphadenopathy:     He has no cervical adenopathy.   Neurological: He is alert and oriented to person, place, and time. No sensory deficit.   Skin: Skin is warm and dry. He is not diaphoretic.   Psychiatric: He has a normal mood and affect. His behavior is normal.   Nursing note and vitals reviewed.      Assessment/Plan   Problem List Items Addressed This Visit        Cardiovascular and Mediastinum    Essential hypertension, benign    Hyperlipidemia      Other Visit Diagnoses     Type 2 diabetes mellitus without complication, without long-term current use of insulin (CMS/[facility])    -  Primary    Relevant Orders    POC Glucose (Completed)    POC Glycosylated Hemoglobin (Hb A1C) (Completed)    Elevated PSA        Relevant Medications    doxycycline (VIBRAMYCIN) 100 MG capsule    Other Relevant Orders    PSA, Total & Free        Problem #1 continue current medications, diet restrictions and exercise plan.  Problem #2 continue current diet

## 2020-09-21 ENCOUNTER — OFFICE VISIT (OUTPATIENT)
Dept: FAMILY MEDICINE CLINIC | Facility: CLINIC | Age: 80
End: 2020-09-21

## 2020-09-21 VITALS
TEMPERATURE: 97.3 F | HEIGHT: 73 IN | DIASTOLIC BLOOD PRESSURE: 84 MMHG | BODY MASS INDEX: 28.76 KG/M2 | HEART RATE: 62 BPM | WEIGHT: 217 LBS | SYSTOLIC BLOOD PRESSURE: 122 MMHG | OXYGEN SATURATION: 98 %

## 2020-09-21 DIAGNOSIS — M88.9 PAGET'S DISEASE OF BONE: ICD-10-CM

## 2020-09-21 DIAGNOSIS — E11.65 TYPE 2 DIABETES MELLITUS WITH HYPERGLYCEMIA, WITHOUT LONG-TERM CURRENT USE OF INSULIN (HCC): Primary | ICD-10-CM

## 2020-09-21 DIAGNOSIS — I10 ESSENTIAL HYPERTENSION, BENIGN: ICD-10-CM

## 2020-09-21 DIAGNOSIS — E78.5 HYPERLIPIDEMIA, UNSPECIFIED HYPERLIPIDEMIA TYPE: ICD-10-CM

## 2020-09-21 DIAGNOSIS — E03.8 OTHER SPECIFIED HYPOTHYROIDISM: ICD-10-CM

## 2020-09-21 DIAGNOSIS — G25.0 BENIGN ESSENTIAL TREMOR: ICD-10-CM

## 2020-09-21 DIAGNOSIS — R97.20 ELEVATED PSA: ICD-10-CM

## 2020-09-21 DIAGNOSIS — K21.9 GASTROESOPHAGEAL REFLUX DISEASE, ESOPHAGITIS PRESENCE NOT SPECIFIED: ICD-10-CM

## 2020-09-21 LAB
GLUCOSE BLDC GLUCOMTR-MCNC: 140 MG/DL (ref 70–130)
HBA1C MFR BLD: 6.2 %

## 2020-09-21 PROCEDURE — 99214 OFFICE O/P EST MOD 30 MIN: CPT | Performed by: FAMILY MEDICINE

## 2020-09-21 PROCEDURE — 83036 HEMOGLOBIN GLYCOSYLATED A1C: CPT | Performed by: FAMILY MEDICINE

## 2020-09-21 PROCEDURE — 82962 GLUCOSE BLOOD TEST: CPT | Performed by: FAMILY MEDICINE

## 2020-09-21 RX ORDER — DOXYCYCLINE 100 MG/1
CAPSULE ORAL
Qty: 84 CAPSULE | Refills: 0 | Status: SHIPPED | OUTPATIENT
Start: 2020-09-21 | End: 2020-12-01

## 2020-09-21 RX ORDER — OMEPRAZOLE 40 MG/1
40 CAPSULE, DELAYED RELEASE ORAL DAILY
Qty: 90 CAPSULE | Refills: 3 | Status: SHIPPED | OUTPATIENT
Start: 2020-09-21 | End: 2021-12-09 | Stop reason: SDUPTHER

## 2020-09-21 RX ORDER — METFORMIN HYDROCHLORIDE 500 MG/1
1000 TABLET, EXTENDED RELEASE ORAL
Qty: 180 TABLET | Refills: 3 | Status: SHIPPED | OUTPATIENT
Start: 2020-09-21 | End: 2021-09-13 | Stop reason: SDUPTHER

## 2020-09-21 RX ORDER — PROPRANOLOL HYDROCHLORIDE 80 MG/1
80 CAPSULE, EXTENDED RELEASE ORAL DAILY
Qty: 90 CAPSULE | Refills: 3 | Status: SHIPPED | OUTPATIENT
Start: 2020-09-21 | End: 2021-09-13 | Stop reason: SDUPTHER

## 2020-09-21 RX ORDER — LEVOTHYROXINE SODIUM 0.15 MG/1
150 TABLET ORAL DAILY
Qty: 90 TABLET | Refills: 3 | Status: SHIPPED | OUTPATIENT
Start: 2020-09-21 | End: 2021-09-13 | Stop reason: SDUPTHER

## 2020-09-21 RX ORDER — LISINOPRIL AND HYDROCHLOROTHIAZIDE 20; 12.5 MG/1; MG/1
1 TABLET ORAL DAILY
Qty: 90 TABLET | Refills: 3 | Status: SHIPPED | OUTPATIENT
Start: 2020-09-21 | End: 2021-09-13 | Stop reason: SDUPTHER

## 2020-09-21 NOTE — PROGRESS NOTES
Subjective   Vinnie Fulton is a 80 y.o. male    Chief Complaint    Diabetes mellitus  Hypertension  Tremor  Hypothyroidism  Elevated PSA    History of Present Illness  Patient presents today for multiple medical problems including diabetes mellitus type 2, hypertension, hypothyroidism, essential tremor, GERD and elevated PSA.  His PSA was greater than 8 last October.  He was treated with antibiotics and asked to return for a follow-up visit but this was postponed because of the coronavirus pandemic.  Patient will be due for his annual wellness at the end of October so we will do another round of antibiotics and recheck him at that time.  He has been doing well otherwise.  He has lost weight by dieting and exercising.  He walks most every day for exercise.  He has some occasional arthritis flareups that slow him down a bit.  Patient is due for refills of his medications today.      The following portions of the patient's history were reviewed and updated as appropriate: allergies, current medications, past social history and problem list    Review of Systems   Constitutional: Negative for appetite change, diaphoresis, fatigue and unexpected weight change.   Eyes: Negative for visual disturbance.   Respiratory: Negative for cough, chest tightness and shortness of breath.    Cardiovascular: Negative for chest pain, palpitations and leg swelling.   Gastrointestinal: Negative for constipation, diarrhea, nausea and vomiting.   Endocrine: Negative for cold intolerance, heat intolerance, polydipsia, polyphagia and polyuria.   Genitourinary: Positive for difficulty urinating and frequency. Negative for discharge, dysuria, hematuria, penile pain, scrotal swelling, testicular pain and urgency.   Musculoskeletal: Positive for arthralgias, back pain and myalgias.   Skin: Negative for color change and rash.   Allergic/Immunologic: Negative for immunocompromised state.   Neurological: Negative for dizziness, tremors, syncope,  weakness, light-headedness, numbness and headaches.   Psychiatric/Behavioral: Negative for agitation and dysphoric mood. The patient is not nervous/anxious.        Objective     Vitals:    09/21/20 1107   BP: 122/84   Pulse: 62   Temp: 97.3 °F (36.3 °C)   SpO2: 98%       Physical Exam  Vitals signs and nursing note reviewed.   Constitutional:       Appearance: Normal appearance. He is well-developed. He is not diaphoretic.   HENT:      Head: Normocephalic.      Mouth/Throat:      Mouth: Mucous membranes are moist.      Pharynx: Oropharynx is clear.   Eyes:      Conjunctiva/sclera: Conjunctivae normal.      Pupils: Pupils are equal, round, and reactive to light.   Neck:      Musculoskeletal: Neck supple.      Thyroid: No thyromegaly.      Vascular: No JVD.   Cardiovascular:      Rate and Rhythm: Normal rate and regular rhythm.      Pulses: Normal pulses.      Heart sounds: Normal heart sounds. No murmur.   Pulmonary:      Effort: Pulmonary effort is normal. No respiratory distress.      Breath sounds: Normal breath sounds.   Abdominal:      General: Bowel sounds are normal.      Palpations: Abdomen is soft.      Tenderness: There is no abdominal tenderness.   Lymphadenopathy:      Cervical: No cervical adenopathy.   Skin:     General: Skin is warm and dry.   Neurological:      General: No focal deficit present.      Mental Status: He is alert and oriented to person, place, and time.      Sensory: No sensory deficit.   Psychiatric:         Mood and Affect: Mood normal.         Behavior: Behavior normal.         Assessment/Plan   Problem List Items Addressed This Visit        Cardiovascular and Mediastinum    Essential hypertension, benign    Relevant Medications    lisinopril-hydrochlorothiazide (PRINZIDE,ZESTORETIC) 20-12.5 MG per tablet    propranolol LA (INDERAL LA) 80 MG 24 hr capsule    Hyperlipidemia       Digestive    Esophageal reflux    Relevant Medications    omeprazole (priLOSEC) 40 MG capsule        Endocrine    Hypothyroidism    Relevant Medications    propranolol LA (INDERAL LA) 80 MG 24 hr capsule    levothyroxine (SYNTHROID, LEVOTHROID) 150 MCG tablet       Musculoskeletal and Integument    Paget's disease of bone      Other Visit Diagnoses     Type 2 diabetes mellitus with hyperglycemia, without long-term current use of insulin (CMS/[facility])    -  Primary    Relevant Medications    metFORMIN ER (GLUCOPHAGE-XR) 500 MG 24 hr tablet    Other Relevant Orders    POC Glycosylated Hemoglobin (Hb A1C) (Completed)    POC Glucose (Completed)    Elevated PSA        Relevant Medications    doxycycline (MONODOX) 100 MG capsule    Benign essential tremor        Relevant Medications    propranolol LA (INDERAL LA) 80 MG 24 hr capsule

## 2020-12-01 ENCOUNTER — OFFICE VISIT (OUTPATIENT)
Dept: FAMILY MEDICINE CLINIC | Facility: CLINIC | Age: 80
End: 2020-12-01

## 2020-12-01 ENCOUNTER — LAB (OUTPATIENT)
Dept: LAB | Facility: HOSPITAL | Age: 80
End: 2020-12-01

## 2020-12-01 VITALS
OXYGEN SATURATION: 98 % | TEMPERATURE: 98.1 F | BODY MASS INDEX: 29.16 KG/M2 | SYSTOLIC BLOOD PRESSURE: 128 MMHG | WEIGHT: 220 LBS | HEART RATE: 67 BPM | DIASTOLIC BLOOD PRESSURE: 68 MMHG | HEIGHT: 73 IN

## 2020-12-01 DIAGNOSIS — Z00.00 MEDICARE ANNUAL WELLNESS VISIT, SUBSEQUENT: Primary | ICD-10-CM

## 2020-12-01 DIAGNOSIS — R97.20 ELEVATED PSA: ICD-10-CM

## 2020-12-01 DIAGNOSIS — G25.0 BENIGN ESSENTIAL TREMOR: ICD-10-CM

## 2020-12-01 DIAGNOSIS — E11.65 TYPE 2 DIABETES MELLITUS WITH HYPERGLYCEMIA, WITHOUT LONG-TERM CURRENT USE OF INSULIN (HCC): ICD-10-CM

## 2020-12-01 DIAGNOSIS — E78.5 HYPERLIPIDEMIA, UNSPECIFIED HYPERLIPIDEMIA TYPE: ICD-10-CM

## 2020-12-01 DIAGNOSIS — E03.8 OTHER SPECIFIED HYPOTHYROIDISM: ICD-10-CM

## 2020-12-01 DIAGNOSIS — I10 ESSENTIAL HYPERTENSION, BENIGN: ICD-10-CM

## 2020-12-01 DIAGNOSIS — M88.9 PAGET'S DISEASE OF BONE: ICD-10-CM

## 2020-12-01 LAB
ALBUMIN SERPL-MCNC: 5.1 G/DL (ref 3.5–5.2)
ALBUMIN/GLOB SERPL: 1.9 G/DL
ALP SERPL-CCNC: 75 U/L (ref 39–117)
ALT SERPL W P-5'-P-CCNC: 17 U/L (ref 1–41)
ANION GAP SERPL CALCULATED.3IONS-SCNC: 9.1 MMOL/L (ref 5–15)
AST SERPL-CCNC: 22 U/L (ref 1–40)
BILIRUB SERPL-MCNC: 0.7 MG/DL (ref 0–1.2)
BUN SERPL-MCNC: 18 MG/DL (ref 8–23)
BUN/CREAT SERPL: 19.6 (ref 7–25)
CALCIUM SPEC-SCNC: 9.3 MG/DL (ref 8.6–10.5)
CHLORIDE SERPL-SCNC: 97 MMOL/L (ref 98–107)
CHOLEST SERPL-MCNC: 202 MG/DL (ref 0–200)
CO2 SERPL-SCNC: 31.9 MMOL/L (ref 22–29)
CREAT SERPL-MCNC: 0.92 MG/DL (ref 0.76–1.27)
DEPRECATED RDW RBC AUTO: 45.5 FL (ref 37–54)
ERYTHROCYTE [DISTWIDTH] IN BLOOD BY AUTOMATED COUNT: 13.2 % (ref 12.3–15.4)
GFR SERPL CREATININE-BSD FRML MDRD: 79 ML/MIN/1.73
GLOBULIN UR ELPH-MCNC: 2.7 GM/DL
GLUCOSE SERPL-MCNC: 138 MG/DL (ref 65–99)
HCT VFR BLD AUTO: 47.3 % (ref 37.5–51)
HDLC SERPL-MCNC: 42 MG/DL (ref 40–60)
HGB BLD-MCNC: 15.9 G/DL (ref 13–17.7)
LDLC SERPL CALC-MCNC: 124 MG/DL (ref 0–100)
LDLC/HDLC SERPL: 2.85 {RATIO}
MCH RBC QN AUTO: 30.9 PG (ref 26.6–33)
MCHC RBC AUTO-ENTMCNC: 33.6 G/DL (ref 31.5–35.7)
MCV RBC AUTO: 91.8 FL (ref 79–97)
PLATELET # BLD AUTO: 186 10*3/MM3 (ref 140–450)
PMV BLD AUTO: 12 FL (ref 6–12)
POTASSIUM SERPL-SCNC: 5.2 MMOL/L (ref 3.5–5.2)
PROT SERPL-MCNC: 7.8 G/DL (ref 6–8.5)
PSA SERPL-MCNC: 10.6 NG/ML (ref 0–4)
RBC # BLD AUTO: 5.15 10*6/MM3 (ref 4.14–5.8)
SODIUM SERPL-SCNC: 138 MMOL/L (ref 136–145)
T4 FREE SERPL-MCNC: 2.14 NG/DL (ref 0.93–1.7)
TRIGL SERPL-MCNC: 202 MG/DL (ref 0–150)
TSH SERPL DL<=0.05 MIU/L-ACNC: 0.57 UIU/ML (ref 0.27–4.2)
VLDLC SERPL-MCNC: 36 MG/DL (ref 5–40)
WBC # BLD AUTO: 8.15 10*3/MM3 (ref 3.4–10.8)

## 2020-12-01 PROCEDURE — 84153 ASSAY OF PSA TOTAL: CPT

## 2020-12-01 PROCEDURE — 84154 ASSAY OF PSA FREE: CPT

## 2020-12-01 PROCEDURE — 83036 HEMOGLOBIN GLYCOSYLATED A1C: CPT

## 2020-12-01 PROCEDURE — 80061 LIPID PANEL: CPT

## 2020-12-01 PROCEDURE — G0439 PPPS, SUBSEQ VISIT: HCPCS | Performed by: FAMILY MEDICINE

## 2020-12-01 PROCEDURE — 84443 ASSAY THYROID STIM HORMONE: CPT

## 2020-12-01 PROCEDURE — 84439 ASSAY OF FREE THYROXINE: CPT

## 2020-12-01 PROCEDURE — 36415 COLL VENOUS BLD VENIPUNCTURE: CPT

## 2020-12-01 PROCEDURE — 80053 COMPREHEN METABOLIC PANEL: CPT

## 2020-12-01 PROCEDURE — 85027 COMPLETE CBC AUTOMATED: CPT

## 2020-12-01 NOTE — PROGRESS NOTES
The ABCs of the Annual Wellness Visit  Subsequent Medicare Wellness Visit    Chief Complaint   Patient presents with   • Medicare Wellness-subsequent     Pt is fasting today   • Hypertension   • Hyperlipidemia   • Hypothyroidism   • Osteoarthritis       Subjective   History of Present Illness:  Vinnie Fulton is a 80 y.o. male who presents for a Subsequent Medicare Wellness Visit.    HEALTH RISK ASSESSMENT    Recent Hospitalizations:  No hospitalization(s) within the last year.    Current Medical Providers:  Patient Care Team:  Ivan Yoder MD as PCP - General  Ivan Yoder MD as PCP - Family Medicine    Smoking Status:  Social History     Tobacco Use   Smoking Status Former Smoker   Smokeless Tobacco Former User       Alcohol Consumption:  Social History     Substance and Sexual Activity   Alcohol Use No       Depression Screen:   PHQ-2/PHQ-9 Depression Screening 12/1/2020   Little interest or pleasure in doing things 0   Feeling down, depressed, or hopeless 0   Total Score 0       Fall Risk Screen:  SKYLER Fall Risk Assessment was completed, and patient is at LOW risk for falls.Assessment completed on:12/1/2020    Health Habits and Functional and Cognitive Screening:  Functional & Cognitive Status 12/1/2020   Do you have difficulty preparing food and eating? No   Do you have difficulty bathing yourself, getting dressed or grooming yourself? No   Do you have difficulty using the toilet? No   Do you have difficulty moving around from place to place? No   Do you have trouble with steps or getting out of a bed or a chair? No   Current Diet Well Balanced Diet   Dental Exam Up to date   Eye Exam Up to date   Exercise (times per week) 7 times per week   Current Exercise Activities Include Walking   Do you need help using the phone?  No   Are you deaf or do you have serious difficulty hearing?  No   Do you need help with transportation? No   Do you need help shopping? No   Do you need help  preparing meals?  No   Do you need help with housework?  No   Do you need help with laundry? No   Do you need help taking your medications? No   Do you need help managing money? No   Do you ever drive or ride in a car without wearing a seat belt? No         Does the patient have evidence of cognitive impairment? No    Asprin use counseling:Taking ASA appropriately as indicated    Age-appropriate Screening Schedule:  Refer to the list below for future screening recommendations based on patient's age, sex and/or medical conditions. Orders for these recommended tests are listed in the plan section. The patient has been provided with a written plan.    Health Maintenance   Topic Date Due   • URINE MICROALBUMIN  1940   • TDAP/TD VACCINES (1 - Tdap) 07/10/1959   • ZOSTER VACCINE (1 of 2) 07/10/1990   • DIABETIC EYE EXAM  10/16/2019   • INFLUENZA VACCINE  08/01/2020   • LIPID PANEL  10/29/2020   • HEMOGLOBIN A1C  03/21/2021          The following portions of the patient's history were reviewed and updated as appropriate: allergies, current medications, past family history, past medical history, past social history, past surgical history and problem list.    Outpatient Medications Prior to Visit   Medication Sig Dispense Refill   • ascorbic acid (VITAMIN C) 1000 MG tablet Take 1 tablet by mouth Daily.  1   • aspirin 81 MG EC tablet Take 81 mg by mouth daily.     • HAVRIX 1440 EL U/ML vaccine ADM 1ML IM UTD  0   • levothyroxine (SYNTHROID, LEVOTHROID) 150 MCG tablet Take 1 tablet by mouth Daily. 90 tablet 3   • lisinopril-hydrochlorothiazide (PRINZIDE,ZESTORETIC) 20-12.5 MG per tablet Take 1 tablet by mouth Daily. 90 tablet 3   • metFORMIN ER (GLUCOPHAGE-XR) 500 MG 24 hr tablet Take 2 tablets by mouth Daily With Breakfast. 180 tablet 3   • omeprazole (priLOSEC) 40 MG capsule Take 1 capsule by mouth Daily. 90 capsule 3   • propranolol LA (INDERAL LA) 80 MG 24 hr capsule Take 1 capsule by mouth Daily. 90 capsule 3   •  "doxycycline (MONODOX) 100 MG capsule 1 capsule twice a day for 4 weeks then 1 capsule daily for 4 weeks then stop 84 capsule 0     No facility-administered medications prior to visit.        Patient Active Problem List   Diagnosis   • Paget's disease of bone   • Esophageal reflux   • Essential hypertension, benign   • Allergic rhinitis   • Generalized osteoarthrosis, involving multiple sites   • Hyperlipidemia   • Hypothyroidism   • Herpes zoster   • Cellulitis and abscess       Advanced Care Planning:  ACP discussion was held with the patient during this visit. Patient has an advance directive (not in EMR), copy requested.    Review of Systems    Compared to one year ago, the patient feels his physical health is the same.  Compared to one year ago, the patient feels his mental health is the same.    Reviewed chart for potential of high risk medication in the elderly: not applicable  Reviewed chart for potential of harmful drug interactions in the elderly:not applicable    Objective         Vitals:    12/01/20 0948   BP: 128/68   Pulse: 67   Temp: 98.1 °F (36.7 °C)   SpO2: 98%   Weight: 99.8 kg (220 lb)   Height: 185.4 cm (72.99\")   PainSc: 0-No pain       Body mass index is 29.03 kg/m².  Discussed the patient's BMI with him. The BMI is in the acceptable range.    Physical Exam    Lab Results   Component Value Date    HGBA1C 6.2 09/21/2020        Assessment/Plan   Medicare Risks and Personalized Health Plan  CMS Preventative Services Quick Reference  Cardiovascular risk  Immunizations Discussed/Encouraged (specific immunizations; adacel Tdap and Shingrix )  Prostate Cancer Screening     The above risks/problems have been discussed with the patient.  Pertinent information has been shared with the patient in the After Visit Summary.  Follow up plans and orders are seen below in the Assessment/Plan Section.    Diagnoses and all orders for this visit:    1. Medicare annual wellness visit, subsequent (Primary)    2. Type " 2 diabetes mellitus with hyperglycemia, without long-term current use of insulin (CMS/[facility])  -     Comprehensive Metabolic Panel; Future  -     Hemoglobin A1c; Future    3. Hyperlipidemia, unspecified hyperlipidemia type  -     Comprehensive Metabolic Panel; Future  -     Lipid Panel; Future    4. Essential hypertension, benign  -     Comprehensive Metabolic Panel; Future  -     TSH; Future  -     T4, Free; Future    5. Benign essential tremor  -     CBC (No Diff); Future  -     Comprehensive Metabolic Panel; Future  -     TSH; Future  -     T4, Free; Future    6. Other specified hypothyroidism  -     TSH; Future  -     T4, Free; Future    7. Paget's disease of bone  -     CBC (No Diff); Future  -     Comprehensive Metabolic Panel; Future    8. Elevated PSA  -     PSA DIAGNOSTIC; Future      Follow Up:  Return in about 6 months (around 6/1/2021) for Recheck.     An After Visit Summary and PPPS were given to the patient.

## 2020-12-02 DIAGNOSIS — R97.20 ELEVATED PSA: Primary | ICD-10-CM

## 2020-12-02 LAB — HBA1C MFR BLD: 6.5 % (ref 4.8–5.6)

## 2020-12-03 LAB
PSA FREE MFR SERPL: 21.2 %
PSA FREE SERPL-MCNC: 2.06 NG/ML
PSA SERPL-MCNC: 9.7 NG/ML (ref 0–4)

## 2021-09-13 DIAGNOSIS — G25.0 BENIGN ESSENTIAL TREMOR: ICD-10-CM

## 2021-09-13 DIAGNOSIS — E11.65 TYPE 2 DIABETES MELLITUS WITH HYPERGLYCEMIA, WITHOUT LONG-TERM CURRENT USE OF INSULIN (HCC): ICD-10-CM

## 2021-09-13 DIAGNOSIS — E03.8 OTHER SPECIFIED HYPOTHYROIDISM: ICD-10-CM

## 2021-09-13 DIAGNOSIS — I10 ESSENTIAL HYPERTENSION, BENIGN: ICD-10-CM

## 2021-09-13 RX ORDER — LISINOPRIL AND HYDROCHLOROTHIAZIDE 20; 12.5 MG/1; MG/1
1 TABLET ORAL DAILY
Qty: 90 TABLET | Refills: 0 | Status: SHIPPED | OUTPATIENT
Start: 2021-09-13 | End: 2021-11-04 | Stop reason: SDUPTHER

## 2021-09-13 RX ORDER — PROPRANOLOL HYDROCHLORIDE 80 MG/1
80 CAPSULE, EXTENDED RELEASE ORAL DAILY
Qty: 90 CAPSULE | Refills: 0 | Status: SHIPPED | OUTPATIENT
Start: 2021-09-13 | End: 2021-10-31 | Stop reason: HOSPADM

## 2021-09-13 RX ORDER — LEVOTHYROXINE SODIUM 0.15 MG/1
150 TABLET ORAL DAILY
Qty: 90 TABLET | Refills: 0 | Status: SHIPPED | OUTPATIENT
Start: 2021-09-13 | End: 2021-12-09 | Stop reason: SDUPTHER

## 2021-09-13 RX ORDER — METFORMIN HYDROCHLORIDE 500 MG/1
1000 TABLET, EXTENDED RELEASE ORAL
Qty: 180 TABLET | Refills: 0 | Status: SHIPPED | OUTPATIENT
Start: 2021-09-13 | End: 2021-12-09 | Stop reason: SDUPTHER

## 2021-09-13 NOTE — TELEPHONE ENCOUNTER
Caller: Vinnie Fulton    Relationship: Self    Best call back number: [phone]     Medication needed:   Requested Prescriptions     Pending Prescriptions Disp Refills   • levothyroxine (SYNTHROID, LEVOTHROID) 150 MCG tablet 90 tablet 3     Sig: Take 1 tablet by mouth Daily.   • lisinopril-hydrochlorothiazide (PRINZIDE,ZESTORETIC) 20-12.5 MG per tablet 90 tablet 3     Sig: Take 1 tablet by mouth Daily.   • metFORMIN ER (GLUCOPHAGE-XR) 500 MG 24 hr tablet 180 tablet 3     Sig: Take 2 tablets by mouth Daily With Breakfast.   • propranolol LA (INDERAL LA) 80 MG 24 hr capsule 90 capsule 3     Sig: Take 1 capsule by mouth Daily.   OMEPRAZOLE 40 MG, ALSO.  IT WANTED A DIAGNOSIS.    What is the patient's preferred pharmacy: [id number] [place], [place] - [address] AT [facility] [phone] [facility] [phone] FX

## 2021-10-22 ENCOUNTER — OFFICE VISIT (OUTPATIENT)
Dept: FAMILY MEDICINE CLINIC | Facility: CLINIC | Age: 81
End: 2021-10-22

## 2021-10-22 VITALS
DIASTOLIC BLOOD PRESSURE: 74 MMHG | SYSTOLIC BLOOD PRESSURE: 138 MMHG | TEMPERATURE: 97 F | HEIGHT: 73 IN | RESPIRATION RATE: 14 BRPM | OXYGEN SATURATION: 98 % | WEIGHT: 221 LBS | BODY MASS INDEX: 29.29 KG/M2 | HEART RATE: 65 BPM

## 2021-10-22 DIAGNOSIS — R42 VERTIGO: ICD-10-CM

## 2021-10-22 DIAGNOSIS — J01.00 ACUTE NON-RECURRENT MAXILLARY SINUSITIS: Primary | ICD-10-CM

## 2021-10-22 PROCEDURE — 99213 OFFICE O/P EST LOW 20 MIN: CPT | Performed by: PHYSICIAN ASSISTANT

## 2021-10-22 RX ORDER — MECLIZINE HYDROCHLORIDE 25 MG/1
25 TABLET ORAL 3 TIMES DAILY PRN
Status: DISCONTINUED | OUTPATIENT
Start: 2021-10-22 | End: 2021-10-22

## 2021-10-22 RX ORDER — NAPROXEN SODIUM 220 MG
TABLET ORAL EVERY 12 HOURS
COMMUNITY

## 2021-10-22 RX ORDER — CEFDINIR 300 MG/1
300 CAPSULE ORAL 2 TIMES DAILY
Qty: 20 CAPSULE | Refills: 0 | Status: SHIPPED | OUTPATIENT
Start: 2021-10-22 | End: 2021-10-31 | Stop reason: HOSPADM

## 2021-10-22 RX ORDER — MECLIZINE HYDROCHLORIDE 25 MG/1
25 TABLET ORAL 3 TIMES DAILY PRN
Qty: 21 TABLET | Refills: 0 | Status: SHIPPED | OUTPATIENT
Start: 2021-10-22 | End: 2023-03-22 | Stop reason: SDUPTHER

## 2021-10-22 NOTE — PROGRESS NOTES
Subjective   Vinnie Fulton is a 81 y.o. male  Dizziness (x1 week)  Vertigo    History of Present Illness  Patient is a pleasant 81-year-old white male who comes in complaining of episode of vertigo the last 2 mornings, patient states earlier in the week he had a sinus infection infection symptoms allergic eyes sinus pressure sinus congestion pressure both right left ears patient states he arose 2 days ago with some sensation of vertigo room spinning improved throughout the day  The following portions of the patient's history were reviewed and updated as appropriate: allergies, current medications, past social history and problem list    Review of Systems   Constitutional: Negative for appetite change, diaphoresis, fatigue and unexpected weight change.   Eyes: Negative for visual disturbance.   Respiratory: Negative for cough, chest tightness and shortness of breath.    Cardiovascular: Negative for chest pain, palpitations and leg swelling.   Gastrointestinal: Negative for diarrhea, nausea and vomiting.   Endocrine: Negative for polydipsia, polyphagia and polyuria.   Skin: Negative for color change and rash.   Neurological: Negative for dizziness, syncope, weakness, light-headedness, numbness and headaches.       Objective     Vitals:    10/22/21 0854   BP: 138/74   Pulse: 65   Resp: 14   Temp: 97 °F (36.1 °C)   SpO2: 98%       Physical Exam  Vitals and nursing note reviewed.   Constitutional:       Appearance: He is well-developed. He is not diaphoretic.   HENT:      Head: Normocephalic and atraumatic.      Right Ear: Tympanic membrane and ear canal normal.      Left Ear: Tympanic membrane and ear canal normal.      Nose: Mucosal edema and rhinorrhea present.      Right Sinus: Maxillary sinus tenderness and frontal sinus tenderness present.      Left Sinus: Maxillary sinus tenderness and frontal sinus tenderness present.      Mouth/Throat:      Pharynx: No oropharyngeal exudate.   Eyes:      Pupils: Pupils are  equal, round, and reactive to light.   Neck:      Thyroid: No thyromegaly.      Vascular: No JVD.   Cardiovascular:      Rate and Rhythm: Normal rate and regular rhythm.      Pulses: Normal pulses.      Heart sounds: Normal heart sounds. No murmur heard.      Pulmonary:      Effort: Pulmonary effort is normal. No respiratory distress.      Breath sounds: Normal breath sounds.   Abdominal:      General: Bowel sounds are normal.      Palpations: Abdomen is soft.      Tenderness: There is no abdominal tenderness.   Musculoskeletal:      Cervical back: Neck supple.   Lymphadenopathy:      Cervical: No cervical adenopathy.   Skin:     General: Skin is warm and dry.   Neurological:      Sensory: No sensory deficit.         Assessment/Plan     Diagnoses and all orders for this visit:    1. Acute non-recurrent maxillary sinusitis (Primary)  -     cefdinir (OMNICEF) 300 MG capsule; Take 1 capsule by mouth 2 (Two) Times a Day.  Dispense: 20 capsule; Refill: 0  -     meclizine (ANTIVERT) 25 MG tablet; Take 1 tablet by mouth 3 (Three) Times a Day As Needed for Dizziness.  Dispense: 21 tablet; Refill: 0    2. Vertigo  -     Discontinue: meclizine (ANTIVERT) tablet 25 mg  -     Discontinue: meclizine (ANTIVERT) tablet 25 mg  -     meclizine (ANTIVERT) 25 MG tablet; Take 1 tablet by mouth 3 (Three) Times a Day As Needed for Dizziness.  Dispense: 21 tablet; Refill: 0     I spent 15 minutes in patient care: Reviewing records prior to the visit, examining the patient, entering orders and documentation    Part of this note may be an electronic transcription/translation of spoken language to printed text using the Dragon Dictation System.

## 2021-10-29 ENCOUNTER — APPOINTMENT (OUTPATIENT)
Dept: CT IMAGING | Facility: HOSPITAL | Age: 81
End: 2021-10-29

## 2021-10-29 ENCOUNTER — HOSPITAL ENCOUNTER (INPATIENT)
Facility: HOSPITAL | Age: 81
LOS: 2 days | Discharge: HOME | End: 2021-10-31
Attending: EMERGENCY MEDICINE | Admitting: STUDENT IN AN ORGANIZED HEALTH CARE EDUCATION/TRAINING PROGRAM

## 2021-10-29 ENCOUNTER — APPOINTMENT (OUTPATIENT)
Dept: GENERAL RADIOLOGY | Facility: HOSPITAL | Age: 81
End: 2021-10-29

## 2021-10-29 ENCOUNTER — APPOINTMENT (OUTPATIENT)
Dept: MRI IMAGING | Facility: HOSPITAL | Age: 81
End: 2021-10-29

## 2021-10-29 DIAGNOSIS — R13.11 ORAL PHASE DYSPHAGIA: Primary | ICD-10-CM

## 2021-10-29 DIAGNOSIS — R29.898 WEAKNESS OF LEFT LOWER EXTREMITY: ICD-10-CM

## 2021-10-29 DIAGNOSIS — R29.898 WEAKNESS OF LEFT UPPER EXTREMITY: ICD-10-CM

## 2021-10-29 DIAGNOSIS — R29.810 FACIAL WEAKNESS: ICD-10-CM

## 2021-10-29 PROBLEM — C61 PROSTATE CANCER: Status: ACTIVE | Noted: 2021-10-29

## 2021-10-29 PROBLEM — E83.42 HYPOMAGNESEMIA: Status: ACTIVE | Noted: 2021-10-29

## 2021-10-29 PROBLEM — E11.9 TYPE 2 DIABETES MELLITUS (HCC): Status: ACTIVE | Noted: 2021-10-29

## 2021-10-29 PROBLEM — I16.1 HYPERTENSIVE EMERGENCY: Status: ACTIVE | Noted: 2021-10-29

## 2021-10-29 PROBLEM — R20.0 LEFT FACIAL NUMBNESS: Status: ACTIVE | Noted: 2021-10-29

## 2021-10-29 LAB
ALBUMIN SERPL-MCNC: 4.4 G/DL (ref 3.5–5.2)
ALBUMIN/GLOB SERPL: 1.6 G/DL
ALP SERPL-CCNC: 78 U/L (ref 39–117)
ALT SERPL W P-5'-P-CCNC: 19 U/L (ref 1–41)
ALT SERPL W P-5'-P-CCNC: 20 U/L (ref 1–41)
ANION GAP SERPL CALCULATED.3IONS-SCNC: 17 MMOL/L (ref 5–15)
APTT PPP: 32.1 SECONDS (ref 22–39)
AST SERPL-CCNC: 21 U/L (ref 1–40)
AST SERPL-CCNC: 22 U/L (ref 1–40)
BASE EXCESS BLDA CALC-SCNC: 5 MMOL/L (ref -5–5)
BASOPHILS # BLD AUTO: 0.04 10*3/MM3 (ref 0–0.2)
BASOPHILS NFR BLD AUTO: 0.5 % (ref 0–1.5)
BILIRUB SERPL-MCNC: 0.4 MG/DL (ref 0–1.2)
BUN SERPL-MCNC: 18 MG/DL (ref 8–23)
BUN/CREAT SERPL: 20.2 (ref 7–25)
CA-I BLDA-SCNC: 1.14 MMOL/L (ref 1.2–1.32)
CALCIUM SPEC-SCNC: 8.8 MG/DL (ref 8.6–10.5)
CHLORIDE SERPL-SCNC: 96 MMOL/L (ref 98–107)
CO2 BLDA-SCNC: 31 MMOL/L (ref 24–29)
CO2 SERPL-SCNC: 25 MMOL/L (ref 22–29)
CREAT BLDA-MCNC: 0.9 MG/DL (ref 0.6–1.3)
CREAT SERPL-MCNC: 0.89 MG/DL (ref 0.76–1.27)
DEPRECATED RDW RBC AUTO: 40.7 FL (ref 37–54)
EOSINOPHIL # BLD AUTO: 0.28 10*3/MM3 (ref 0–0.4)
EOSINOPHIL NFR BLD AUTO: 3.7 % (ref 0.3–6.2)
ERYTHROCYTE [DISTWIDTH] IN BLOOD BY AUTOMATED COUNT: 12.7 % (ref 12.3–15.4)
GFR SERPL CREATININE-BSD FRML MDRD: 82 ML/MIN/1.73
GLOBULIN UR ELPH-MCNC: 2.7 GM/DL
GLUCOSE BLDC GLUCOMTR-MCNC: 272 MG/DL (ref 70–130)
GLUCOSE SERPL-MCNC: 274 MG/DL (ref 65–99)
HCO3 BLDA-SCNC: 29.9 MMOL/L (ref 22–26)
HCT VFR BLD AUTO: 42.5 % (ref 37.5–51)
HCT VFR BLDA CALC: 43 % (ref 38–51)
HGB BLD-MCNC: 14.9 G/DL (ref 13–17.7)
HGB BLDA-MCNC: 14.6 G/DL (ref 12–17)
HOLD SPECIMEN: NORMAL
HOLD SPECIMEN: NORMAL
IMM GRANULOCYTES # BLD AUTO: 0.03 10*3/MM3 (ref 0–0.05)
IMM GRANULOCYTES NFR BLD AUTO: 0.4 % (ref 0–0.5)
INR PPP: 1 (ref 0.8–1.2)
LYMPHOCYTES # BLD AUTO: 2.08 10*3/MM3 (ref 0.7–3.1)
LYMPHOCYTES NFR BLD AUTO: 27.3 % (ref 19.6–45.3)
MAGNESIUM SERPL-MCNC: 1.6 MG/DL (ref 1.6–2.4)
MAGNESIUM SERPL-MCNC: 1.7 MG/DL (ref 1.6–2.4)
MCH RBC QN AUTO: 30.7 PG (ref 26.6–33)
MCHC RBC AUTO-ENTMCNC: 35.1 G/DL (ref 31.5–35.7)
MCV RBC AUTO: 87.6 FL (ref 79–97)
MONOCYTES # BLD AUTO: 0.55 10*3/MM3 (ref 0.1–0.9)
MONOCYTES NFR BLD AUTO: 7.2 % (ref 5–12)
NEUTROPHILS NFR BLD AUTO: 4.65 10*3/MM3 (ref 1.7–7)
NEUTROPHILS NFR BLD AUTO: 60.9 % (ref 42.7–76)
NRBC BLD AUTO-RTO: 0 /100 WBC (ref 0–0.2)
PCO2 BLDA: 47.6 MM HG (ref 35–45)
PH BLDA: 7.41 PH UNITS (ref 7.35–7.6)
PLATELET # BLD AUTO: 205 10*3/MM3 (ref 140–450)
PMV BLD AUTO: 10.7 FL (ref 6–12)
PO2 BLDA: 34 MMHG (ref 80–105)
POTASSIUM BLDA-SCNC: 4.1 MMOL/L (ref 3.5–4.9)
POTASSIUM SERPL-SCNC: 4.1 MMOL/L (ref 3.5–5.2)
PROT SERPL-MCNC: 7.1 G/DL (ref 6–8.5)
PROTHROMBIN TIME: 12.2 SECONDS (ref 12.8–15.2)
QT INTERVAL: 400 MS
QTC INTERVAL: 472 MS
RBC # BLD AUTO: 4.85 10*6/MM3 (ref 4.14–5.8)
SAO2 % BLDA: 66 % (ref 95–98)
SARS-COV-2 RNA RESP QL NAA+PROBE: NOT DETECTED
SODIUM BLD-SCNC: 138 MMOL/L (ref 138–146)
SODIUM SERPL-SCNC: 138 MMOL/L (ref 136–145)
TROPONIN T SERPL-MCNC: <0.01 NG/ML (ref 0–0.03)
WBC # BLD AUTO: 7.63 10*3/MM3 (ref 3.4–10.8)
WHOLE BLOOD HOLD SPECIMEN: NORMAL
WHOLE BLOOD HOLD SPECIMEN: NORMAL

## 2021-10-29 PROCEDURE — 0042T HC CT CEREBRAL PERFUSION W/WO CONTRAST: CPT

## 2021-10-29 PROCEDURE — U0005 INFEC AGEN DETEC AMPLI PROBE: HCPCS | Performed by: INTERNAL MEDICINE

## 2021-10-29 PROCEDURE — 82947 ASSAY GLUCOSE BLOOD QUANT: CPT

## 2021-10-29 PROCEDURE — 0 IOPAMIDOL PER 1 ML: Performed by: EMERGENCY MEDICINE

## 2021-10-29 PROCEDURE — U0003 INFECTIOUS AGENT DETECTION BY NUCLEIC ACID (DNA OR RNA); SEVERE ACUTE RESPIRATORY SYNDROME CORONAVIRUS 2 (SARS-COV-2) (CORONAVIRUS DISEASE [COVID-19]), AMPLIFIED PROBE TECHNIQUE, MAKING USE OF HIGH THROUGHPUT TECHNOLOGIES AS DESCRIBED BY CMS-2020-01-R: HCPCS | Performed by: INTERNAL MEDICINE

## 2021-10-29 PROCEDURE — 85014 HEMATOCRIT: CPT

## 2021-10-29 PROCEDURE — 84460 ALANINE AMINO (ALT) (SGPT): CPT | Performed by: EMERGENCY MEDICINE

## 2021-10-29 PROCEDURE — 85025 COMPLETE CBC W/AUTO DIFF WBC: CPT | Performed by: EMERGENCY MEDICINE

## 2021-10-29 PROCEDURE — 80053 COMPREHEN METABOLIC PANEL: CPT | Performed by: INTERNAL MEDICINE

## 2021-10-29 PROCEDURE — 70450 CT HEAD/BRAIN W/O DYE: CPT

## 2021-10-29 PROCEDURE — 84295 ASSAY OF SERUM SODIUM: CPT

## 2021-10-29 PROCEDURE — 84450 TRANSFERASE (AST) (SGOT): CPT | Performed by: EMERGENCY MEDICINE

## 2021-10-29 PROCEDURE — 99222 1ST HOSP IP/OBS MODERATE 55: CPT | Performed by: INTERNAL MEDICINE

## 2021-10-29 PROCEDURE — 84484 ASSAY OF TROPONIN QUANT: CPT | Performed by: EMERGENCY MEDICINE

## 2021-10-29 PROCEDURE — 83735 ASSAY OF MAGNESIUM: CPT | Performed by: INTERNAL MEDICINE

## 2021-10-29 PROCEDURE — 93005 ELECTROCARDIOGRAM TRACING: CPT | Performed by: EMERGENCY MEDICINE

## 2021-10-29 PROCEDURE — 82565 ASSAY OF CREATININE: CPT

## 2021-10-29 PROCEDURE — 70498 CT ANGIOGRAPHY NECK: CPT

## 2021-10-29 PROCEDURE — 84132 ASSAY OF SERUM POTASSIUM: CPT

## 2021-10-29 PROCEDURE — 71045 X-RAY EXAM CHEST 1 VIEW: CPT

## 2021-10-29 PROCEDURE — 70551 MRI BRAIN STEM W/O DYE: CPT

## 2021-10-29 PROCEDURE — 85610 PROTHROMBIN TIME: CPT

## 2021-10-29 PROCEDURE — 99285 EMERGENCY DEPT VISIT HI MDM: CPT

## 2021-10-29 PROCEDURE — 99222 1ST HOSP IP/OBS MODERATE 55: CPT | Performed by: NURSE PRACTITIONER

## 2021-10-29 PROCEDURE — 82803 BLOOD GASES ANY COMBINATION: CPT

## 2021-10-29 PROCEDURE — 82330 ASSAY OF CALCIUM: CPT

## 2021-10-29 PROCEDURE — 85730 THROMBOPLASTIN TIME PARTIAL: CPT | Performed by: EMERGENCY MEDICINE

## 2021-10-29 PROCEDURE — 70496 CT ANGIOGRAPHY HEAD: CPT

## 2021-10-29 RX ORDER — ONDANSETRON 2 MG/ML
4 INJECTION INTRAMUSCULAR; INTRAVENOUS EVERY 6 HOURS PRN
Status: DISCONTINUED | OUTPATIENT
Start: 2021-10-29 | End: 2021-10-31 | Stop reason: HOSPADM

## 2021-10-29 RX ORDER — LEVOTHYROXINE SODIUM 0.15 MG/1
150 TABLET ORAL
Status: DISCONTINUED | OUTPATIENT
Start: 2021-10-30 | End: 2021-10-31 | Stop reason: HOSPADM

## 2021-10-29 RX ORDER — ONDANSETRON 4 MG/1
4 TABLET, FILM COATED ORAL EVERY 6 HOURS PRN
Status: DISCONTINUED | OUTPATIENT
Start: 2021-10-29 | End: 2021-10-31 | Stop reason: HOSPADM

## 2021-10-29 RX ORDER — MAGNESIUM SULFATE HEPTAHYDRATE 40 MG/ML
2 INJECTION, SOLUTION INTRAVENOUS AS NEEDED
Status: DISCONTINUED | OUTPATIENT
Start: 2021-10-29 | End: 2021-10-31 | Stop reason: HOSPADM

## 2021-10-29 RX ORDER — ASPIRIN 325 MG
325 TABLET ORAL DAILY
Status: DISCONTINUED | OUTPATIENT
Start: 2021-10-29 | End: 2021-10-30

## 2021-10-29 RX ORDER — ACETAMINOPHEN 160 MG/5ML
650 SOLUTION ORAL EVERY 4 HOURS PRN
Status: DISCONTINUED | OUTPATIENT
Start: 2021-10-29 | End: 2021-10-31 | Stop reason: HOSPADM

## 2021-10-29 RX ORDER — SODIUM CHLORIDE 0.9 % (FLUSH) 0.9 %
10 SYRINGE (ML) INJECTION AS NEEDED
Status: DISCONTINUED | OUTPATIENT
Start: 2021-10-29 | End: 2021-10-31 | Stop reason: HOSPADM

## 2021-10-29 RX ORDER — MAGNESIUM SULFATE HEPTAHYDRATE 40 MG/ML
4 INJECTION, SOLUTION INTRAVENOUS AS NEEDED
Status: DISCONTINUED | OUTPATIENT
Start: 2021-10-29 | End: 2021-10-31 | Stop reason: HOSPADM

## 2021-10-29 RX ORDER — PANTOPRAZOLE SODIUM 40 MG/1
40 TABLET, DELAYED RELEASE ORAL DAILY
Status: DISCONTINUED | OUTPATIENT
Start: 2021-10-30 | End: 2021-10-31 | Stop reason: HOSPADM

## 2021-10-29 RX ORDER — SODIUM CHLORIDE 0.9 % (FLUSH) 0.9 %
10 SYRINGE (ML) INJECTION EVERY 12 HOURS SCHEDULED
Status: DISCONTINUED | OUTPATIENT
Start: 2021-10-30 | End: 2021-10-31 | Stop reason: HOSPADM

## 2021-10-29 RX ORDER — ASPIRIN 300 MG/1
300 SUPPOSITORY RECTAL DAILY
Status: DISCONTINUED | OUTPATIENT
Start: 2021-10-29 | End: 2021-10-30

## 2021-10-29 RX ORDER — NICOTINE POLACRILEX 4 MG
15 LOZENGE BUCCAL
Status: DISCONTINUED | OUTPATIENT
Start: 2021-10-29 | End: 2021-10-31 | Stop reason: HOSPADM

## 2021-10-29 RX ORDER — ACETAMINOPHEN 650 MG/1
650 SUPPOSITORY RECTAL EVERY 4 HOURS PRN
Status: DISCONTINUED | OUTPATIENT
Start: 2021-10-29 | End: 2021-10-31 | Stop reason: HOSPADM

## 2021-10-29 RX ORDER — ACETAMINOPHEN 325 MG/1
650 TABLET ORAL EVERY 4 HOURS PRN
Status: DISCONTINUED | OUTPATIENT
Start: 2021-10-29 | End: 2021-10-31 | Stop reason: HOSPADM

## 2021-10-29 RX ORDER — DEXTROSE MONOHYDRATE 25 G/50ML
25 INJECTION, SOLUTION INTRAVENOUS
Status: DISCONTINUED | OUTPATIENT
Start: 2021-10-29 | End: 2021-10-31 | Stop reason: HOSPADM

## 2021-10-29 RX ADMIN — ASPIRIN 300 MG: 300 SUPPOSITORY RECTAL at 21:31

## 2021-10-29 RX ADMIN — IOPAMIDOL 125 ML: 755 INJECTION, SOLUTION INTRAVENOUS at 20:18

## 2021-10-30 ENCOUNTER — APPOINTMENT (OUTPATIENT)
Dept: MRI IMAGING | Facility: HOSPITAL | Age: 81
End: 2021-10-30

## 2021-10-30 ENCOUNTER — APPOINTMENT (OUTPATIENT)
Dept: CARDIOLOGY | Facility: HOSPITAL | Age: 81
End: 2021-10-30

## 2021-10-30 LAB
ANION GAP SERPL CALCULATED.3IONS-SCNC: 12 MMOL/L (ref 5–15)
BASOPHILS # BLD AUTO: 0.06 10*3/MM3 (ref 0–0.2)
BASOPHILS NFR BLD AUTO: 0.8 % (ref 0–1.5)
BH CV ECHO MEAS - AO MAX PG (FULL): 0.32 MMHG
BH CV ECHO MEAS - AO MAX PG: 1.9 MMHG
BH CV ECHO MEAS - AO MEAN PG (FULL): 0.12 MMHG
BH CV ECHO MEAS - AO MEAN PG: 0.98 MMHG
BH CV ECHO MEAS - AO ROOT AREA (BSA CORRECTED): 1.6
BH CV ECHO MEAS - AO ROOT AREA: 9.8 CM^2
BH CV ECHO MEAS - AO ROOT DIAM: 3.5 CM
BH CV ECHO MEAS - AO V2 MAX: 68.5 CM/SEC
BH CV ECHO MEAS - AO V2 MEAN: 44.3 CM/SEC
BH CV ECHO MEAS - AO V2 VTI: 11.9 CM
BH CV ECHO MEAS - AVA(I,A): 3.8 CM^2
BH CV ECHO MEAS - AVA(I,D): 3.8 CM^2
BH CV ECHO MEAS - AVA(V,A): 3.3 CM^2
BH CV ECHO MEAS - AVA(V,D): 3.3 CM^2
BH CV ECHO MEAS - BSA(HAYCOCK): 2.2 M^2
BH CV ECHO MEAS - BSA: 2.2 M^2
BH CV ECHO MEAS - BZI_BMI: 31.3 KILOGRAMS/M^2
BH CV ECHO MEAS - BZI_METRIC_HEIGHT: 177.8 CM
BH CV ECHO MEAS - BZI_METRIC_WEIGHT: 98.9 KG
BH CV ECHO MEAS - EDV(CUBED): 121.6 ML
BH CV ECHO MEAS - EDV(MOD-SP2): 100 ML
BH CV ECHO MEAS - EDV(MOD-SP4): 125 ML
BH CV ECHO MEAS - EDV(TEICH): 115.8 ML
BH CV ECHO MEAS - EF(CUBED): 83.8 %
BH CV ECHO MEAS - EF(MOD-BP): 65 %
BH CV ECHO MEAS - EF(MOD-SP2): 65 %
BH CV ECHO MEAS - EF(MOD-SP4): 61.6 %
BH CV ECHO MEAS - EF(TEICH): 76.6 %
BH CV ECHO MEAS - ESV(CUBED): 19.7 ML
BH CV ECHO MEAS - ESV(MOD-SP2): 35 ML
BH CV ECHO MEAS - ESV(MOD-SP4): 48 ML
BH CV ECHO MEAS - ESV(TEICH): 27.1 ML
BH CV ECHO MEAS - FS: 45.5 %
BH CV ECHO MEAS - IVS/LVPW: 0.83
BH CV ECHO MEAS - IVSD: 0.88 CM
BH CV ECHO MEAS - LA DIMENSION: 4.3 CM
BH CV ECHO MEAS - LA/AO: 1.2
BH CV ECHO MEAS - LAD MAJOR: 5.6 CM
BH CV ECHO MEAS - LAT PEAK E' VEL: 6.6 CM/SEC
BH CV ECHO MEAS - LATERAL E/E' RATIO: 8
BH CV ECHO MEAS - LV DIASTOLIC VOL/BSA (35-75): 57.7 ML/M^2
BH CV ECHO MEAS - LV MASS(C)D: 173.3 GRAMS
BH CV ECHO MEAS - LV MASS(C)DI: 80 GRAMS/M^2
BH CV ECHO MEAS - LV MAX PG: 1.6 MMHG
BH CV ECHO MEAS - LV MEAN PG: 0.86 MMHG
BH CV ECHO MEAS - LV SYSTOLIC VOL/BSA (12-30): 22.2 ML/M^2
BH CV ECHO MEAS - LV V1 MAX: 62.5 CM/SEC
BH CV ECHO MEAS - LV V1 MEAN: 43 CM/SEC
BH CV ECHO MEAS - LV V1 VTI: 12.6 CM
BH CV ECHO MEAS - LVIDD: 5 CM
BH CV ECHO MEAS - LVIDS: 2.7 CM
BH CV ECHO MEAS - LVLD AP2: 7.9 CM
BH CV ECHO MEAS - LVLD AP4: 8.5 CM
BH CV ECHO MEAS - LVLS AP2: 6.7 CM
BH CV ECHO MEAS - LVLS AP4: 6.5 CM
BH CV ECHO MEAS - LVOT AREA (M): 3.5 CM^2
BH CV ECHO MEAS - LVOT AREA: 3.6 CM^2
BH CV ECHO MEAS - LVOT DIAM: 2.1 CM
BH CV ECHO MEAS - LVPWD: 1.1 CM
BH CV ECHO MEAS - MED PEAK E' VEL: 5.7 CM/SEC
BH CV ECHO MEAS - MEDIAL E/E' RATIO: 9.2
BH CV ECHO MEAS - MV A MAX VEL: 54.3 CM/SEC
BH CV ECHO MEAS - MV DEC SLOPE: 211.5 CM/SEC^2
BH CV ECHO MEAS - MV DEC TIME: 0.26 SEC
BH CV ECHO MEAS - MV E MAX VEL: 53.8 CM/SEC
BH CV ECHO MEAS - MV E/A: 0.99
BH CV ECHO MEAS - MV MAX PG: 1.4 MMHG
BH CV ECHO MEAS - MV MEAN PG: 0.87 MMHG
BH CV ECHO MEAS - MV P1/2T MAX VEL: 57 CM/SEC
BH CV ECHO MEAS - MV P1/2T: 78.9 MSEC
BH CV ECHO MEAS - MV V2 MAX: 58.4 CM/SEC
BH CV ECHO MEAS - MV V2 MEAN: 44.9 CM/SEC
BH CV ECHO MEAS - MV V2 VTI: 14.6 CM
BH CV ECHO MEAS - MVA P1/2T LCG: 3.9 CM^2
BH CV ECHO MEAS - MVA(P1/2T): 2.8 CM^2
BH CV ECHO MEAS - MVA(VTI): 3.1 CM^2
BH CV ECHO MEAS - PA ACC SLOPE: 841.1 CM/SEC^2
BH CV ECHO MEAS - PA ACC TIME: 0.1 SEC
BH CV ECHO MEAS - PA PR(ACCEL): 34.6 MMHG
BH CV ECHO MEAS - SI(AO): 53.6 ML/M^2
BH CV ECHO MEAS - SI(CUBED): 47.1 ML/M^2
BH CV ECHO MEAS - SI(LVOT): 20.9 ML/M^2
BH CV ECHO MEAS - SI(MOD-SP2): 30 ML/M^2
BH CV ECHO MEAS - SI(MOD-SP4): 35.6 ML/M^2
BH CV ECHO MEAS - SI(TEICH): 41 ML/M^2
BH CV ECHO MEAS - SV(AO): 115.9 ML
BH CV ECHO MEAS - SV(CUBED): 101.9 ML
BH CV ECHO MEAS - SV(LVOT): 45.2 ML
BH CV ECHO MEAS - SV(MOD-SP2): 65 ML
BH CV ECHO MEAS - SV(MOD-SP4): 77 ML
BH CV ECHO MEAS - SV(TEICH): 88.7 ML
BH CV ECHO MEAS - TAPSE (>1.6): 2.2 CM
BH CV ECHO MEASUREMENTS AVERAGE E/E' RATIO: 8.75
BH CV XLRA - RV BASE: 3.7 CM
BH CV XLRA - RV LENGTH: 6.2 CM
BH CV XLRA - RV MID: 3.2 CM
BH CV XLRA - TDI S': 12.8 CM/SEC
BUN SERPL-MCNC: 19 MG/DL (ref 8–23)
BUN/CREAT SERPL: 23.2 (ref 7–25)
CALCIUM SPEC-SCNC: 8.9 MG/DL (ref 8.6–10.5)
CHLORIDE SERPL-SCNC: 100 MMOL/L (ref 98–107)
CHOLEST SERPL-MCNC: 183 MG/DL (ref 0–200)
CK SERPL-CCNC: 57 U/L (ref 20–200)
CO2 SERPL-SCNC: 30 MMOL/L (ref 22–29)
CREAT SERPL-MCNC: 0.82 MG/DL (ref 0.76–1.27)
DEPRECATED RDW RBC AUTO: 41.2 FL (ref 37–54)
EOSINOPHIL # BLD AUTO: 0.29 10*3/MM3 (ref 0–0.4)
EOSINOPHIL NFR BLD AUTO: 3.8 % (ref 0.3–6.2)
ERYTHROCYTE [DISTWIDTH] IN BLOOD BY AUTOMATED COUNT: 12.5 % (ref 12.3–15.4)
FOLATE SERPL-MCNC: 11.6 NG/ML (ref 4.78–24.2)
GFR SERPL CREATININE-BSD FRML MDRD: 90 ML/MIN/1.73
GLUCOSE BLDC GLUCOMTR-MCNC: 152 MG/DL (ref 70–130)
GLUCOSE BLDC GLUCOMTR-MCNC: 181 MG/DL (ref 70–130)
GLUCOSE BLDC GLUCOMTR-MCNC: 278 MG/DL (ref 70–130)
GLUCOSE BLDC GLUCOMTR-MCNC: 280 MG/DL (ref 70–130)
GLUCOSE SERPL-MCNC: 142 MG/DL (ref 65–99)
HBA1C MFR BLD: 7.3 % (ref 4.8–5.6)
HCT VFR BLD AUTO: 41.6 % (ref 37.5–51)
HDLC SERPL-MCNC: 36 MG/DL (ref 40–60)
HGB BLD-MCNC: 13.8 G/DL (ref 13–17.7)
HOLD SPECIMEN: NORMAL
IMM GRANULOCYTES # BLD AUTO: 0.02 10*3/MM3 (ref 0–0.05)
IMM GRANULOCYTES NFR BLD AUTO: 0.3 % (ref 0–0.5)
LDLC SERPL CALC-MCNC: 108 MG/DL (ref 0–100)
LDLC/HDLC SERPL: 2.84 {RATIO}
LEFT ATRIUM VOLUME INDEX: 23.1 ML/M^2
LEFT ATRIUM VOLUME: 50 ML
LV EF 2D ECHO EST: 60 %
LYMPHOCYTES # BLD AUTO: 2.73 10*3/MM3 (ref 0.7–3.1)
LYMPHOCYTES NFR BLD AUTO: 36.2 % (ref 19.6–45.3)
MCH RBC QN AUTO: 30.1 PG (ref 26.6–33)
MCHC RBC AUTO-ENTMCNC: 33.2 G/DL (ref 31.5–35.7)
MCV RBC AUTO: 90.6 FL (ref 79–97)
MONOCYTES # BLD AUTO: 0.64 10*3/MM3 (ref 0.1–0.9)
MONOCYTES NFR BLD AUTO: 8.5 % (ref 5–12)
NEUTROPHILS NFR BLD AUTO: 3.8 10*3/MM3 (ref 1.7–7)
NEUTROPHILS NFR BLD AUTO: 50.4 % (ref 42.7–76)
NRBC BLD AUTO-RTO: 0 /100 WBC (ref 0–0.2)
PLATELET # BLD AUTO: 176 10*3/MM3 (ref 140–450)
PMV BLD AUTO: 11.1 FL (ref 6–12)
POTASSIUM SERPL-SCNC: 4.4 MMOL/L (ref 3.5–5.2)
RBC # BLD AUTO: 4.59 10*6/MM3 (ref 4.14–5.8)
SODIUM SERPL-SCNC: 142 MMOL/L (ref 136–145)
TRIGL SERPL-MCNC: 224 MG/DL (ref 0–150)
TSH SERPL DL<=0.05 MIU/L-ACNC: 0.49 UIU/ML (ref 0.27–4.2)
VIT B12 BLD-MCNC: 523 PG/ML (ref 211–946)
VLDLC SERPL-MCNC: 39 MG/DL (ref 5–40)
WBC # BLD AUTO: 7.54 10*3/MM3 (ref 3.4–10.8)

## 2021-10-30 PROCEDURE — 93306 TTE W/DOPPLER COMPLETE: CPT

## 2021-10-30 PROCEDURE — 99232 SBSQ HOSP IP/OBS MODERATE 35: CPT | Performed by: STUDENT IN AN ORGANIZED HEALTH CARE EDUCATION/TRAINING PROGRAM

## 2021-10-30 PROCEDURE — 82550 ASSAY OF CK (CPK): CPT | Performed by: CLINICAL NURSE SPECIALIST

## 2021-10-30 PROCEDURE — 80061 LIPID PANEL: CPT | Performed by: NURSE PRACTITIONER

## 2021-10-30 PROCEDURE — 84443 ASSAY THYROID STIM HORMONE: CPT | Performed by: INTERNAL MEDICINE

## 2021-10-30 PROCEDURE — 99233 SBSQ HOSP IP/OBS HIGH 50: CPT | Performed by: PSYCHIATRY & NEUROLOGY

## 2021-10-30 PROCEDURE — 72156 MRI NECK SPINE W/O & W/DYE: CPT

## 2021-10-30 PROCEDURE — 92610 EVALUATE SWALLOWING FUNCTION: CPT

## 2021-10-30 PROCEDURE — 82962 GLUCOSE BLOOD TEST: CPT

## 2021-10-30 PROCEDURE — 82525 ASSAY OF COPPER: CPT | Performed by: CLINICAL NURSE SPECIALIST

## 2021-10-30 PROCEDURE — 63710000001 PREDNISONE PER 1 MG: Performed by: NURSE PRACTITIONER

## 2021-10-30 PROCEDURE — A9577 INJ MULTIHANCE: HCPCS | Performed by: STUDENT IN AN ORGANIZED HEALTH CARE EDUCATION/TRAINING PROGRAM

## 2021-10-30 PROCEDURE — 93306 TTE W/DOPPLER COMPLETE: CPT | Performed by: INTERNAL MEDICINE

## 2021-10-30 PROCEDURE — 85025 COMPLETE CBC W/AUTO DIFF WBC: CPT | Performed by: INTERNAL MEDICINE

## 2021-10-30 PROCEDURE — 80048 BASIC METABOLIC PNL TOTAL CA: CPT | Performed by: INTERNAL MEDICINE

## 2021-10-30 PROCEDURE — 92523 SPEECH SOUND LANG COMPREHEN: CPT

## 2021-10-30 PROCEDURE — 63710000001 INSULIN LISPRO (HUMAN) PER 5 UNITS: Performed by: INTERNAL MEDICINE

## 2021-10-30 PROCEDURE — 0 GADOBENATE DIMEGLUMINE 529 MG/ML SOLUTION: Performed by: STUDENT IN AN ORGANIZED HEALTH CARE EDUCATION/TRAINING PROGRAM

## 2021-10-30 PROCEDURE — 70553 MRI BRAIN STEM W/O & W/DYE: CPT

## 2021-10-30 PROCEDURE — 82746 ASSAY OF FOLIC ACID SERUM: CPT | Performed by: CLINICAL NURSE SPECIALIST

## 2021-10-30 PROCEDURE — 82607 VITAMIN B-12: CPT | Performed by: CLINICAL NURSE SPECIALIST

## 2021-10-30 PROCEDURE — 83036 HEMOGLOBIN GLYCOSYLATED A1C: CPT | Performed by: INTERNAL MEDICINE

## 2021-10-30 RX ORDER — ASPIRIN 81 MG/1
81 TABLET, CHEWABLE ORAL DAILY
Status: DISCONTINUED | OUTPATIENT
Start: 2021-10-31 | End: 2021-10-31 | Stop reason: HOSPADM

## 2021-10-30 RX ORDER — PREDNISONE 20 MG/1
60 TABLET ORAL
Status: DISCONTINUED | OUTPATIENT
Start: 2021-10-30 | End: 2021-10-31 | Stop reason: HOSPADM

## 2021-10-30 RX ORDER — VALACYCLOVIR HYDROCHLORIDE 500 MG/1
1000 TABLET, FILM COATED ORAL EVERY 12 HOURS SCHEDULED
Status: DISCONTINUED | OUTPATIENT
Start: 2021-10-31 | End: 2021-10-31 | Stop reason: HOSPADM

## 2021-10-30 RX ORDER — SODIUM CHLORIDE 0.9 % (FLUSH) 0.9 %
10 SYRINGE (ML) INJECTION EVERY 12 HOURS SCHEDULED
Status: DISCONTINUED | OUTPATIENT
Start: 2021-10-30 | End: 2021-10-31 | Stop reason: HOSPADM

## 2021-10-30 RX ORDER — SODIUM CHLORIDE 0.9 % (FLUSH) 0.9 %
10 SYRINGE (ML) INJECTION AS NEEDED
Status: DISCONTINUED | OUTPATIENT
Start: 2021-10-30 | End: 2021-10-31 | Stop reason: HOSPADM

## 2021-10-30 RX ORDER — ATORVASTATIN CALCIUM 40 MG/1
80 TABLET, FILM COATED ORAL NIGHTLY
Status: DISCONTINUED | OUTPATIENT
Start: 2021-10-30 | End: 2021-10-31 | Stop reason: HOSPADM

## 2021-10-30 RX ADMIN — ATORVASTATIN CALCIUM 80 MG: 40 TABLET, FILM COATED ORAL at 21:07

## 2021-10-30 RX ADMIN — MINERAL OIL AND PETROLATUM: 150; 830 OINTMENT OPHTHALMIC at 15:54

## 2021-10-30 RX ADMIN — LEVOTHYROXINE SODIUM 150 MCG: 150 TABLET ORAL at 10:14

## 2021-10-30 RX ADMIN — PANTOPRAZOLE SODIUM 40 MG: 40 TABLET, DELAYED RELEASE ORAL at 10:12

## 2021-10-30 RX ADMIN — ASPIRIN 325 MG ORAL TABLET 325 MG: 325 PILL ORAL at 10:12

## 2021-10-30 RX ADMIN — Medication 10 ML: at 21:11

## 2021-10-30 RX ADMIN — Medication 10 ML: at 01:25

## 2021-10-30 RX ADMIN — PREDNISONE 60 MG: 20 TABLET ORAL at 10:12

## 2021-10-30 RX ADMIN — INSULIN LISPRO 2 UNITS: 100 INJECTION, SOLUTION INTRAVENOUS; SUBCUTANEOUS at 11:28

## 2021-10-30 RX ADMIN — GADOBENATE DIMEGLUMINE 20 ML: 529 INJECTION, SOLUTION INTRAVENOUS at 20:17

## 2021-10-30 RX ADMIN — INSULIN LISPRO 4 UNITS: 100 INJECTION, SOLUTION INTRAVENOUS; SUBCUTANEOUS at 16:57

## 2021-10-30 RX ADMIN — HYDROCHLOROTHIAZIDE: 12.5 CAPSULE ORAL at 11:28

## 2021-10-31 ENCOUNTER — READMISSION MANAGEMENT (OUTPATIENT)
Dept: CALL CENTER | Facility: HOSPITAL | Age: 81
End: 2021-10-31

## 2021-10-31 VITALS
HEIGHT: 70 IN | SYSTOLIC BLOOD PRESSURE: 141 MMHG | WEIGHT: 218 LBS | BODY MASS INDEX: 31.21 KG/M2 | DIASTOLIC BLOOD PRESSURE: 106 MMHG | RESPIRATION RATE: 18 BRPM | OXYGEN SATURATION: 93 % | HEART RATE: 99 BPM | TEMPERATURE: 98.3 F

## 2021-10-31 LAB
ANION GAP SERPL CALCULATED.3IONS-SCNC: 13 MMOL/L (ref 5–15)
BASOPHILS # BLD AUTO: 0.04 10*3/MM3 (ref 0–0.2)
BASOPHILS NFR BLD AUTO: 0.3 % (ref 0–1.5)
BUN SERPL-MCNC: 18 MG/DL (ref 8–23)
BUN/CREAT SERPL: 19.6 (ref 7–25)
CALCIUM SPEC-SCNC: 9.2 MG/DL (ref 8.6–10.5)
CHLORIDE SERPL-SCNC: 95 MMOL/L (ref 98–107)
CO2 SERPL-SCNC: 29 MMOL/L (ref 22–29)
CREAT SERPL-MCNC: 0.92 MG/DL (ref 0.76–1.27)
DEPRECATED RDW RBC AUTO: 40.3 FL (ref 37–54)
EOSINOPHIL # BLD AUTO: 0.16 10*3/MM3 (ref 0–0.4)
EOSINOPHIL NFR BLD AUTO: 1.3 % (ref 0.3–6.2)
ERYTHROCYTE [DISTWIDTH] IN BLOOD BY AUTOMATED COUNT: 12.7 % (ref 12.3–15.4)
GFR SERPL CREATININE-BSD FRML MDRD: 79 ML/MIN/1.73
GLUCOSE BLDC GLUCOMTR-MCNC: 160 MG/DL (ref 70–130)
GLUCOSE BLDC GLUCOMTR-MCNC: 286 MG/DL (ref 70–130)
GLUCOSE SERPL-MCNC: 163 MG/DL (ref 65–99)
HCT VFR BLD AUTO: 40.1 % (ref 37.5–51)
HGB BLD-MCNC: 13.8 G/DL (ref 13–17.7)
IMM GRANULOCYTES # BLD AUTO: 0.04 10*3/MM3 (ref 0–0.05)
IMM GRANULOCYTES NFR BLD AUTO: 0.3 % (ref 0–0.5)
LYMPHOCYTES # BLD AUTO: 2.06 10*3/MM3 (ref 0.7–3.1)
LYMPHOCYTES NFR BLD AUTO: 16.5 % (ref 19.6–45.3)
MAGNESIUM SERPL-MCNC: 2 MG/DL (ref 1.6–2.4)
MCH RBC QN AUTO: 30.1 PG (ref 26.6–33)
MCHC RBC AUTO-ENTMCNC: 34.4 G/DL (ref 31.5–35.7)
MCV RBC AUTO: 87.4 FL (ref 79–97)
MONOCYTES # BLD AUTO: 0.87 10*3/MM3 (ref 0.1–0.9)
MONOCYTES NFR BLD AUTO: 7 % (ref 5–12)
NEUTROPHILS NFR BLD AUTO: 74.6 % (ref 42.7–76)
NEUTROPHILS NFR BLD AUTO: 9.29 10*3/MM3 (ref 1.7–7)
NRBC BLD AUTO-RTO: 0 /100 WBC (ref 0–0.2)
PLATELET # BLD AUTO: 223 10*3/MM3 (ref 140–450)
PMV BLD AUTO: 11 FL (ref 6–12)
POTASSIUM SERPL-SCNC: 3.9 MMOL/L (ref 3.5–5.2)
RBC # BLD AUTO: 4.59 10*6/MM3 (ref 4.14–5.8)
SODIUM SERPL-SCNC: 137 MMOL/L (ref 136–145)
WBC # BLD AUTO: 12.46 10*3/MM3 (ref 3.4–10.8)

## 2021-10-31 PROCEDURE — 83735 ASSAY OF MAGNESIUM: CPT | Performed by: INTERNAL MEDICINE

## 2021-10-31 PROCEDURE — 97110 THERAPEUTIC EXERCISES: CPT

## 2021-10-31 PROCEDURE — 97162 PT EVAL MOD COMPLEX 30 MIN: CPT

## 2021-10-31 PROCEDURE — 99233 SBSQ HOSP IP/OBS HIGH 50: CPT | Performed by: PSYCHIATRY & NEUROLOGY

## 2021-10-31 PROCEDURE — 99239 HOSP IP/OBS DSCHRG MGMT >30: CPT | Performed by: STUDENT IN AN ORGANIZED HEALTH CARE EDUCATION/TRAINING PROGRAM

## 2021-10-31 PROCEDURE — 63710000001 INSULIN LISPRO (HUMAN) PER 5 UNITS: Performed by: INTERNAL MEDICINE

## 2021-10-31 PROCEDURE — 97535 SELF CARE MNGMENT TRAINING: CPT

## 2021-10-31 PROCEDURE — 80048 BASIC METABOLIC PNL TOTAL CA: CPT | Performed by: STUDENT IN AN ORGANIZED HEALTH CARE EDUCATION/TRAINING PROGRAM

## 2021-10-31 PROCEDURE — 82962 GLUCOSE BLOOD TEST: CPT

## 2021-10-31 PROCEDURE — 63710000001 PREDNISONE PER 1 MG: Performed by: NURSE PRACTITIONER

## 2021-10-31 PROCEDURE — 85025 COMPLETE CBC W/AUTO DIFF WBC: CPT | Performed by: STUDENT IN AN ORGANIZED HEALTH CARE EDUCATION/TRAINING PROGRAM

## 2021-10-31 PROCEDURE — 97165 OT EVAL LOW COMPLEX 30 MIN: CPT

## 2021-10-31 RX ORDER — PREDNISONE 20 MG/1
60 TABLET ORAL
Qty: 9 TABLET | Refills: 0 | Status: SHIPPED | OUTPATIENT
Start: 2021-11-01 | End: 2021-11-04

## 2021-10-31 RX ORDER — VALACYCLOVIR HYDROCHLORIDE 1 G/1
1000 TABLET, FILM COATED ORAL EVERY 12 HOURS SCHEDULED
Qty: 9 TABLET | Refills: 0 | Status: SHIPPED | OUTPATIENT
Start: 2021-10-31 | End: 2021-11-05

## 2021-10-31 RX ORDER — CLOPIDOGREL BISULFATE 75 MG/1
75 TABLET ORAL DAILY
Qty: 21 TABLET | Refills: 0 | Status: SHIPPED | OUTPATIENT
Start: 2021-11-01 | End: 2022-11-15 | Stop reason: SDUPTHER

## 2021-10-31 RX ORDER — ATORVASTATIN CALCIUM 80 MG/1
80 TABLET, FILM COATED ORAL NIGHTLY
Qty: 30 TABLET | Refills: 1 | Status: SHIPPED | OUTPATIENT
Start: 2021-10-31 | End: 2021-12-09 | Stop reason: SDUPTHER

## 2021-10-31 RX ORDER — CLOPIDOGREL BISULFATE 75 MG/1
75 TABLET ORAL DAILY
Status: DISCONTINUED | OUTPATIENT
Start: 2021-10-31 | End: 2021-10-31 | Stop reason: HOSPADM

## 2021-10-31 RX ADMIN — ASPIRIN 81 MG: 81 TABLET, CHEWABLE ORAL at 08:52

## 2021-10-31 RX ADMIN — PREDNISONE 60 MG: 20 TABLET ORAL at 08:52

## 2021-10-31 RX ADMIN — INSULIN LISPRO 2 UNITS: 100 INJECTION, SOLUTION INTRAVENOUS; SUBCUTANEOUS at 08:53

## 2021-10-31 RX ADMIN — PANTOPRAZOLE SODIUM 40 MG: 40 TABLET, DELAYED RELEASE ORAL at 08:52

## 2021-10-31 RX ADMIN — CLOPIDOGREL BISULFATE 75 MG: 75 TABLET ORAL at 11:44

## 2021-10-31 RX ADMIN — VALACYCLOVIR HYDROCHLORIDE 1000 MG: 500 TABLET, FILM COATED ORAL at 08:53

## 2021-10-31 RX ADMIN — LEVOTHYROXINE SODIUM 150 MCG: 150 TABLET ORAL at 06:06

## 2021-10-31 RX ADMIN — INSULIN LISPRO 4 UNITS: 100 INJECTION, SOLUTION INTRAVENOUS; SUBCUTANEOUS at 11:44

## 2021-10-31 RX ADMIN — HYDROCHLOROTHIAZIDE: 12.5 CAPSULE ORAL at 08:52

## 2021-10-31 NOTE — OUTREACH NOTE
Prep Survey      Responses   [facility] facility patient discharged from? [place]   Is LACE score < 7 ? No   Emergency Room discharge w/ pulse ox? No   Eligibility [facility]   Date of Admission 10/29/21   Date of Discharge 10/31/21   Discharge Disposition Home or Self Care   Discharge diagnosis Hypertensive emergency, facial weakness, Acute CVA, T2DM   Does the patient have one of the following disease processes/diagnoses(primary or secondary)? Stroke (TIA)   Does the patient have Home health ordered? No   Is there a DME ordered? No   Prep survey completed? Yes          Jany Santos RN

## 2021-11-01 ENCOUNTER — TELEPHONE (OUTPATIENT)
Dept: NEUROLOGY | Facility: CLINIC | Age: 81
End: 2021-11-01

## 2021-11-01 ENCOUNTER — TRANSITIONAL CARE MANAGEMENT TELEPHONE ENCOUNTER (OUTPATIENT)
Dept: CALL CENTER | Facility: HOSPITAL | Age: 81
End: 2021-11-01

## 2021-11-01 DIAGNOSIS — I63.239 CEREBROVASCULAR ACCIDENT (CVA) DUE TO OCCLUSION OF CAROTID ARTERY, UNSPECIFIED BLOOD VESSEL LATERALITY (HCC): Primary | ICD-10-CM

## 2021-11-01 NOTE — OUTREACH NOTE
Call Center TCM Note      Responses   [facility] patient discharged from? [place]   Does the patient have one of the following disease processes/diagnoses(primary or secondary)? Stroke (TIA)   TCM attempt successful? Yes   Discharge diagnosis Hypertensive emergency, facial weakness, Acute CVA, T2DM   Meds reviewed with patient/caregiver? Yes   Is the patient having any side effects they believe may be caused by any medication additions or changes? No   Does the patient have all medications ordered at discharge? Yes   Is the patient taking all medications as directed (includes completed medication regime)? Yes   Does the patient have a primary care provider?  Yes   Does the patient have an appointment with their PCP within 7 days of discharge? Yes   Comments regarding PCP TCM FWP with PCP Dr Yoder is 11/04/2021.   Has the patient kept scheduled appointments due by today? N/A   Has home health visited the patient within 72 hours of discharge? N/A   Psychosocial issues? No   Does the patient require any assistance with activities of daily living such as eating, bathing, dressing, walking, etc.? No   Does the patient have any residual symptoms from stroke/TIA? No   Does the patient understand the diet ordered at discharge? Yes   Nursing interventions Reviewed instructions with patient   What is the patient's perception of their health status since discharge? Improving   Nursing interventions Nurse provided patient education   Is the patient able to teach back FAST for Stroke? Yes   Is the patient/caregiver able to teach back the risk factors for a stroke? High blood pressure-goal below 120/80,  Carotid or other artery disease,  Illegal drug use,  Sleep apnea,  History of TIAs,  Smoking,  Diabetes,  History of Afib,  HIgh red blood cell count,  High Cholesterol,  Physical inactivity and obesity,  Excessive alcohol intake   Is the patient/caregiver able to teach back signs and symptoms related to disease  "process for when to call PCP? Yes   Is the patient/caregiver able to teach back signs and symptoms related to disease process for when to call 911? Yes   If the patient is a current smoker, are they able to teach back resources for cessation? Not a smoker   Is the patient/caregiver able to teach back the hierarchy of who to call/visit for symptoms/problems? PCP, Specialist, Home health nurse, Urgent Care, ED, 911 Yes   TCM call completed? Yes   Wrap up additional comments Pt feeling pretty well, facial issues resolving. All new medications in place. OF NOTE FOR CASE MGMT: Pt was to be sent home with \"heart monitor\" but none avail at d/c from hospital. Pt told someone would come to his home today to \"hook him up\" but he has not heard from anyone. Can you please check and let pt know? Thank you.  TCM FWP with PCP is 11/04/2021.          Tracy Ferrari MA    11/1/2021, 16:40 EDT      "

## 2021-11-01 NOTE — TELEPHONE ENCOUNTER
Pt called to schedule a 1 month follow up.  Pt was given the first available, but informed that we would check on if a sooner appointment was needed.    Please advise.

## 2021-11-01 NOTE — OUTREACH NOTE
Call Center TCM Note      Responses   [facility] patient discharged from? [place]   Does the patient have one of the following disease processes/diagnoses(primary or secondary)? Stroke (TIA)   TCM attempt successful? No   Unsuccessful attempts Attempt 1          Tracy Ferrari MA    11/1/2021, 15:16 EDT

## 2021-11-04 ENCOUNTER — OFFICE VISIT (OUTPATIENT)
Dept: FAMILY MEDICINE CLINIC | Facility: CLINIC | Age: 81
End: 2021-11-04

## 2021-11-04 VITALS
DIASTOLIC BLOOD PRESSURE: 60 MMHG | WEIGHT: 218 LBS | HEART RATE: 90 BPM | SYSTOLIC BLOOD PRESSURE: 118 MMHG | RESPIRATION RATE: 14 BRPM | BODY MASS INDEX: 28.89 KG/M2 | OXYGEN SATURATION: 98 % | HEIGHT: 73 IN | TEMPERATURE: 97.8 F

## 2021-11-04 DIAGNOSIS — I10 ESSENTIAL HYPERTENSION, BENIGN: ICD-10-CM

## 2021-11-04 DIAGNOSIS — R20.0 LEFT FACIAL NUMBNESS: ICD-10-CM

## 2021-11-04 DIAGNOSIS — E11.69 TYPE 2 DIABETES MELLITUS WITH OTHER SPECIFIED COMPLICATION, WITHOUT LONG-TERM CURRENT USE OF INSULIN (HCC): Primary | ICD-10-CM

## 2021-11-04 DIAGNOSIS — I16.1 HYPERTENSIVE EMERGENCY: ICD-10-CM

## 2021-11-04 LAB
COPPER SERPL-MCNC: 117 UG/DL (ref 69–132)
EXPIRATION DATE: ABNORMAL
GLUCOSE BLDC GLUCOMTR-MCNC: 221 MG/DL (ref 70–130)
Lab: ABNORMAL

## 2021-11-04 PROCEDURE — 82947 ASSAY GLUCOSE BLOOD QUANT: CPT | Performed by: PHYSICIAN ASSISTANT

## 2021-11-04 PROCEDURE — 1111F DSCHRG MED/CURRENT MED MERGE: CPT | Performed by: PHYSICIAN ASSISTANT

## 2021-11-04 PROCEDURE — 99496 TRANSJ CARE MGMT HIGH F2F 7D: CPT | Performed by: PHYSICIAN ASSISTANT

## 2021-11-04 RX ORDER — LISINOPRIL AND HYDROCHLOROTHIAZIDE 20; 12.5 MG/1; MG/1
1 TABLET ORAL DAILY
Qty: 90 TABLET | Refills: 3 | Status: SHIPPED | OUTPATIENT
Start: 2021-11-04 | End: 2021-11-18 | Stop reason: SINTOL

## 2021-11-04 NOTE — PROGRESS NOTES
Subjective    is being seen for follow-up after hospitalization at [facility]    The date of hospitalization were       Date of Admission: 10/29/2021  7:48 PM  Date of Discharge:  10/31/2021  Discharge Diagnosis during hospitalization was    Hypertensive emergency [I16.1]           Active Hospital Problems     Diagnosis   POA   • Hypertensive emergency [I16.1]   Yes   • Left facial numbness [R20.0]   Unknown   • Prostate cancer (HCC) [C61]   Unknown   • Hypomagnesemia [E83.42]   Unknown   • Type 2 diabetes mellitus (HCC) [E11.9]   Unknown   • Essential hypertension, benign [I10]   Yes   • Hyperlipidemia [E78.5]   Yes   • Hypothyroidism [E03.9]   Yes   • 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  Patient request to cardiology    Discharged to: To home  Discharged on the following medicines: See below     Discharge Medications          Accurate as of November 4, 2021  4:46 PM. If you have any questions, ask your nurse or doctor.            Continue These Medications      Instructions Start Date   Aleve 220 MG tablet  Generic drug: naproxen sodium   Every 12 Hours      artificial tears ophthalmic ointment   PRN every hour as needed in L eye      ascorbic acid 1000 MG tablet  Commonly known as: VITAMIN C   1 tablet, Oral, Daily      aspirin 81 MG EC tablet   81 mg, Oral, Daily      atorvastatin 80 MG tablet  Commonly known as: LIPITOR   80 mg,  Oral, Nightly      clopidogrel 75 MG tablet  Commonly known as: PLAVIX   75 mg, Oral, Daily      levothyroxine 150 MCG tablet  Commonly known as: SYNTHROID, LEVOTHROID   150 mcg, Oral, Daily      lisinopril-hydrochlorothiazide 20-12.5 MG per tablet  Commonly known as: PRINZIDE,ZESTORETIC   1 tablet, Oral, Daily      meclizine 25 MG tablet  Commonly known as: ANTIVERT   25 mg, Oral, 3 Times Daily PRN      metFORMIN  MG 24 hr tablet  Commonly known as: GLUCOPHAGE-XR   1,000 mg, Oral, Daily With Breakfast      omeprazole 40 MG capsule  Commonly known as: priLOSEC   40 mg, Oral, Daily      valACYclovir 1000 MG tablet  Commonly known as: VALTREX   1,000 mg, Oral, Every 12 Hours Scheduled         Stop These Medications    predniSONE 20 MG tablet  Commonly known as: DELTASONE  Stopped by: TATY Wilkins              Information from the hospitalization was given to patient      Review of Systems   Constitutional: Negative for fatigue and unexpected weight change.   Respiratory: Negative for cough, chest tightness and shortness of breath.    Cardiovascular: Negative for chest pain, palpitations and leg swelling.   Gastrointestinal: Negative for nausea.   Skin: Negative for color change and rash.   Neurological: Negative for dizziness, syncope, weakness and headaches.       Objective     Vitals:    11/04/21 1353   BP: 118/60   Pulse: 90   Resp: 14   Temp: 97.8 °F (36.6 °C)   SpO2: 98%       Physical Exam  Constitutional:       Appearance: He is well-developed.   HENT:      Head: Normocephalic and atraumatic.      Right Ear: External ear normal.      Left Ear: External ear normal.      Nose: Nose normal.   Eyes:      Conjunctiva/sclera: Conjunctivae normal.      Pupils: Pupils are equal, round, and reactive to light.   Neck:      Thyroid: No thyromegaly.   Cardiovascular:      Rate and Rhythm: Normal rate and regular rhythm.      Heart sounds: Normal heart sounds. No murmur heard.      Pulmonary:      Effort:  Pulmonary effort is normal.      Breath sounds: Normal breath sounds.   Abdominal:      General: Bowel sounds are normal.      Palpations: Abdomen is soft. There is no mass.      Tenderness: There is no abdominal tenderness.   Genitourinary:     Penis: Normal.       Prostate: Normal.      Rectum: Normal.   Musculoskeletal:         General: Normal range of motion.      Cervical back: Normal range of motion and neck supple.   Skin:     General: Skin is warm and dry.   Neurological:      Mental Status: He is alert and oriented to person, place, and time.      Cranial Nerves: No cranial nerve deficit.      Deep Tendon Reflexes: Reflexes are normal and symmetric.         Assessment/Plan     Problem List Items Addressed This Visit        Cardiac and Vasculature    Essential hypertension, benign    Relevant Medications    lisinopril-hydrochlorothiazide (PRINZIDE,ZESTORETIC) 20-12.5 MG per tablet    Hypertensive emergency    Relevant Medications    lisinopril-hydrochlorothiazide (PRINZIDE,ZESTORETIC) 20-12.5 MG per tablet    Other Relevant Orders    Ambulatory Referral to Cardiology       Endocrine and Metabolic    Type 2 diabetes mellitus (HCC) - Primary    Relevant Orders    POC Glucose, Blood (Completed)       Symptoms and Signs    Left facial numbness          #1 refer to cardiology    2.  Keep appointment with stroke clinic continue monitoring blood pressure blood sugar    Follow-up with Dr. Man in 1 month  Transitional Care Management Certification  I certify that the following are true:  1. Communication was made within 2 business days of discharge.  2. Complexity of Medical Decision Making is moderate.  3. Face to face visit occurred within 5 days.    *Note: 15809 is for high complexity patients with a face to face visit within 7 days of discharge.  19210 is for high complexity patients with a face to face on days 8-14 post discharge or medium complexity with face to face visit within 14 days post discharge.

## 2021-11-04 NOTE — PROGRESS NOTES
Subjective   Vinnie Fulton is a 81 y.o. male  Hospital Follow Up Visit (strok and Kadoka Paulsey)      History of Present Illness    The following portions of the patient's history were reviewed and updated as appropriate: allergies, current medications, past social history and problem list    Review of Systems    Objective     Vitals:    11/04/21 1353   BP: 118/60   Pulse: 90   Resp: 14   Temp: 97.8 °F (36.6 °C)   SpO2: 98%       Physical Exam    Assessment/Plan     There are no diagnoses linked to this encounter.

## 2021-11-07 NOTE — ED PROVIDER NOTES
EMERGENCY DEPARTMENT ENCOUNTER    Pt Name: Vinnie Fulton  MRN: [id number]  Pt :   1940  Room Number:  S457/1  Date of encounter:  10/29/2021  PCP: Ivan Yoder MD  ED Provider: Mateo Jensen MD    Historian: Patient and paramedics        HPI:  Chief Complaint: Left-sided weakness           Context: Vinnie Fulton is a 81 y.o. male who presents to the ED c/o milder left arm and leg weakness ongoing for the last couple weeks followed by significant left facial weakness noted at 5 PM this evening.  He felt that earlier in the day he did not have any problems closing his eyes but noted inability to close only the left eye starting at 5 PM.  He does not believe his speech is slurred.  He notes no sensory changes below the neck.  He denies recent illness and trauma.        PAST MEDICAL HISTORY  Medical History        Past Medical History:   Diagnosis Date   • Fistula-in-ano     • GERD (gastroesophageal reflux disease)     • Hyperlipidemia     • Hypertension     • Hyperthyroidism     • Perirectal abscess                 PAST SURGICAL HISTORY  Surgical History         Past Surgical History:   Procedure Laterality Date   • CARPAL TUNNEL RELEASE                   FAMILY HISTORY        Family History   Problem Relation Age of Onset   • Heart disease Mother     • Diabetes Mother     • Heart disease Father     • Diabetes Father     • Cancer Father              SOCIAL HISTORY  Social History   Social History           Socioeconomic History   • Marital status:    Tobacco Use   • Smoking status: Former Smoker   • Smokeless tobacco: Former User   Substance and Sexual Activity   • Alcohol use: No   • Drug use: No               ALLERGIES  Lortab [hydrocodone-acetaminophen] and Tetanus toxoids           REVIEW OF SYSTEMS  Review of Systems         All systems reviewed and negative except for those discussed in HPI.         PHYSICAL EXAM     I have reviewed the triage vital signs and nursing  notes.            ED Triage Vitals   Temp Pulse Resp BP SpO2   -- -- -- -- --       Temp src Heart Rate Source Patient Position BP Location FiO2 (%)   -- -- -- -- --         Physical Exam  GENERAL:   Appears pleasant and nontoxic.  I meet him on EMS arrival and escort him to CT scanner 2.  HENT: Nares patent.  Mild left facial droop.  Inability to completely close the left eye.  EYES: No scleral icterus.  CV: Regular rhythm, regular rate.  No murmurs gallops rubs  RESPIRATORY: Normal effort.  No audible wheezes, rales or rhonchi.  Clear to auscultation  ABDOMEN: Soft, nontender  MUSCULOSKELETAL: No deformities.   NEURO:  Mild to moderate left facial droop.  Mild dysarthric speech without aphasia.  Bilateral lower extremities slowly drift downward.  Alert, moves all extremities, follows commands.  See stroke navigator note for formal NIH which equals 6.  SKIN: Warm, dry, no rash visualized.         LAB RESULTS  No results found for this or any previous visit (from the past 24 hour(s)).    If labs were ordered, I independently reviewed the results.        RADIOLOGY  No Radiology Exams Resulted Within Past 24 Hours    I ordered and reviewed the above noted radiographic studies.      I viewed images of CT head which showed no acute intracranial bleeding per my independent interpretation.    See radiologist's dictation for official interpretation.        PROCEDURES    Critical Care  Performed by: Mateo Jensen MD  Authorized by: Mateo Jensen MD     Critical care provider statement:     Critical care time (minutes):  30    Critical care time was exclusive of:  Separately billable procedures and treating other patients    Critical care was necessary to treat or prevent imminent or life-threatening deterioration of the following conditions:  CNS failure or compromise    Critical care was time spent personally by me on the following activities:  Ordering and performing treatments and interventions, ordering and review  of laboratory studies, ordering and review of radiographic studies, pulse oximetry, re-evaluation of patient's condition, review of old charts, obtaining history from patient or surrogate, examination of patient, evaluation of patient's response to treatment, discussions with consultants and development of treatment plan with patient or surrogate  Comments:      I considered use of IV TPA however left-sided weakness reported in the arm and leg is definitely greater than 4.5 hours.  While the left facial weakness is more acute risk would be significantly greater than benefit and administration of IV TPA.  We evaluated the patient for evidence of large vessel occlusion in consideration for Cath Lab intervention but patient did not meet criteria.        ECG 12 Lead   Final Result   Test Reason : Acute Stroke Protocol (onset < 12 hrs)   Blood Pressure :   */*   mmHG   Vent. Rate :  84 BPM     Atrial Rate :  84 BPM      P-R Int : 202 ms          QRS Dur :  80 ms       QT Int : 400 ms       P-R-T Axes :  82 -13  70 degrees      QTc Int : 472 ms      Normal sinus rhythm   Nonspecific ST and T wave abnormality   Prolonged QT   Abnormal ECG   No previous ECGs available   Confirmed by CINDI HINSON MD ([id number]) on 10/29/2021 11:27:32 PM      Referred By: EDMD           Confirmed By: CINDI HINSON MD          MEDICATIONS GIVEN IN ER    Medications   iopamidol (ISOVUE-370) 76 % injection 125 mL (125 mL Intravenous Given 10/29/21 2018)   gadobenate dimeglumine (MULTIHANCE) injection 20 mL (20 mL Intravenous Given 10/30/21 2017)         PROGRESS, DATA ANALYSIS, CONSULTS, AND MEDICAL DECISION MAKING    All labs have been independently reviewed by me.  All radiology studies have been reviewed by me and the radiologist dictating the report.   EKG's have been independently viewed and interpreted by me.      Differential diagnoses: Acute CVA versus Bell's palsy versus cancerous lesion, etc.      ED Course as of 11/07/21 1841   Fri Oct 29,  2021 2039 I spoke with the stroke navigator in detail.  We agree that the patient should be admitted for further observation, care, diagnostic studies.  I have paged the hospitalist for admission. [MS]   2109 I spoke with Dr. Suarez who will admit.  Current blood pressure 149/86. [MS]      ED Course User Index  [MS] Mateo Jensen MD             AS OF 18:41 EST VITALS:    BP - (!) 141/106  HR - 99  TEMP - 98.3 °F (36.8 °C) (Oral)  O2 SATS - 93%        DIAGNOSIS  Final diagnoses:   Facial weakness   Weakness of left lower extremity   Weakness of left upper extremity         DISPOSITION  Admission           Mateo Jensen MD  11/07/21 2921

## 2021-11-09 ENCOUNTER — READMISSION MANAGEMENT (OUTPATIENT)
Dept: CALL CENTER | Facility: HOSPITAL | Age: 81
End: 2021-11-09

## 2021-11-09 NOTE — OUTREACH NOTE
Stroke Week 2 Survey      Responses   [facility] patient discharged from? [place]   Does the patient have one of the following disease processes/diagnoses(primary or secondary)? Stroke (TIA)   Week 2 attempt successful? Yes   Call start time 1608   Call end time 1613   Discharge diagnosis Hypertensive emergency, facial weakness, Acute CVA, T2DM   Meds reviewed with patient/caregiver? Yes   Is the patient having any side effects they believe may be caused by any medication additions or changes? No   Does the patient have all medications ordered at discharge? Yes   Is the patient taking all medications as directed (includes completed medication regime)? Yes   Does the patient have a primary care provider?  Yes   Does the patient have an appointment with their PCP within 7 days of discharge? Yes   Has the patient kept scheduled appointments due by today? Yes   Has home health visited the patient within 72 hours of discharge? N/A   Psychosocial issues? No   Does the patient require any assistance with activities of daily living such as eating, bathing, dressing, walking, etc.? Yes   Does the patient have any residual symptoms from stroke/TIA? Yes  [Weak with walking.]   Did the patient receive a copy of their discharge instructions? Yes   Nursing interventions Reviewed instructions with patient   What is the patient's perception of their health status since discharge? Worsening   Nursing interventions Nurse provided patient education   Is the patient able to teach back FAST for Stroke? Yes   Is the patient/caregiver able to teach back the risk factors for a stroke? High blood pressure-goal below 120/80,  Smoking,  Diabetes,  High Cholesterol,  Physical inactivity and obesity,  Excessive alcohol intake   Is the patient/caregiver able to teach back signs and symptoms related to disease process for when to call PCP? Yes   Is the patient/caregiver able to teach back signs and symptoms related to disease process for  when to call 911? Yes   If the patient is a current smoker, are they able to teach back resources for cessation? Not a smoker   Is the patient/caregiver able to teach back the hierarchy of who to call/visit for symptoms/problems? PCP, Specialist, Home health nurse, Urgent Care, ED, 911 Yes   Week 2 call completed? Yes   Wrap up additional comments Pts BP was increasing.  changed medication to decrease BP.           Antionette Petit RN

## 2021-11-11 ENCOUNTER — TELEPHONE (OUTPATIENT)
Dept: NEUROLOGY | Facility: CLINIC | Age: 81
End: 2021-11-11

## 2021-11-11 NOTE — TELEPHONE ENCOUNTER
Attempted to call the pt back.  If pt calls back please read back:  that if he has new symptoms he should go back to ER to be evaluated or call PCP.    He can be scheduled at a general neurologist if a sooner appt is available.

## 2021-11-11 NOTE — TELEPHONE ENCOUNTER
Provider: KITTY SALAZAR    Caller: GERARD LITTLE    Relationship to Patient: DAUGHTER    Phone Number: (cali) [phone]    Reason for Call:   CALLED TO SCHED SOONER STROKE FU. GERARD LIVES IN [place] - SAYS SINCE BEING HOME, PATIENT IS HAVING NEW BALANCE ISSUES AND ISN'T EATING. CAN PATIENT BE SEEN AT [facility] OR WOULD January @ STROKE CENTER BE THE SOONEST AVAIL? GERARD REQUESTED WE CALL HER BACK BUT SINCE WE DON'T HAVE VERBAL PERMISSION FROM PATIENT, I WILL CALL PATIENT OR HIS WIFE BACK TO INFORM. PLEASE ADVISE

## 2021-11-11 NOTE — TELEPHONE ENCOUNTER
SCHEDULED PATIENT @ [facility] FOR 11-18. LEIGH WAS CLOSER THAN [facility] WOULD HAVE BEEN FOR HIM. PT'S WIFE GRAYSON SAYS HE HAS FOLLOWED UP WITH 2 OF HIS PROVIDERS SINCE BEING DISCHARGED - THEY HAVE ADJUSTED HIS MEDS WHICH SHE SAYS HAS HELPED IMPROVE HIS BALANCE AND SHE SAYS HE IS NOW EATING. I'VE CANCELLED APPT @ STROKE CLINIC. GRAYSON WILL CALL GERARD TO INFORM

## 2021-11-17 ENCOUNTER — READMISSION MANAGEMENT (OUTPATIENT)
Dept: CALL CENTER | Facility: HOSPITAL | Age: 81
End: 2021-11-17

## 2021-11-18 ENCOUNTER — OFFICE VISIT (OUTPATIENT)
Dept: NEUROLOGY | Facility: CLINIC | Age: 81
End: 2021-11-18

## 2021-11-18 VITALS
HEART RATE: 91 BPM | HEIGHT: 70 IN | WEIGHT: 214 LBS | TEMPERATURE: 97.2 F | BODY MASS INDEX: 30.64 KG/M2 | SYSTOLIC BLOOD PRESSURE: 130 MMHG | OXYGEN SATURATION: 92 % | DIASTOLIC BLOOD PRESSURE: 80 MMHG

## 2021-11-18 DIAGNOSIS — R26.89 SHUFFLING GAIT: ICD-10-CM

## 2021-11-18 DIAGNOSIS — G51.0 LEFT-SIDED BELL'S PALSY: Primary | ICD-10-CM

## 2021-11-18 DIAGNOSIS — I63.9 ACUTE CEREBROVASCULAR ACCIDENT (CVA) OF CEREBELLUM (HCC): ICD-10-CM

## 2021-11-18 DIAGNOSIS — R25.1 TREMOR OF BOTH HANDS: ICD-10-CM

## 2021-11-18 PROCEDURE — 99215 OFFICE O/P EST HI 40 MIN: CPT | Performed by: NURSE PRACTITIONER

## 2021-11-18 RX ORDER — METOPROLOL SUCCINATE 50 MG/1
100 TABLET, EXTENDED RELEASE ORAL DAILY
COMMUNITY
Start: 2021-11-17 | End: 2022-06-22

## 2021-11-18 NOTE — PROGRESS NOTES
Subjective:     Patient ID: Vinnie Fulton is a 81 y.o. male.    CC:   Chief Complaint   Patient presents with   • Stroke   • bell's palsey       HPI:   History of Present Illness   This is a very pleasant 81-year-old male who presents for [facility] stroke and Bell's palsy follow-up.  He was admitted 10/29/2021 until 10/31/2021 for hypertensive emergency, stroke and suspected left-sided Bell's palsy.  He had left facial numbness along with dizziness.  He was admitted with suspicion of just Bell's palsy.  After further evaluation MRI of the brain was completed showing a very small left cerebellar peduncle stroke.  He also had MRI of the cervical spine which showed some foraminal stenosis on the right side but no other concerning findings.  He is currently taking aspirin 81 mg daily, Plavix 75 mg daily and Lipitor 80 mg at night.  He and his wife tell me today that they just told cardiology Dr. Diaz yesterday and he is continuing him on all 3 of these medications.  He has been tolerating these well.  He was having some new symptoms of some dizziness and she has significant GI upset per he and his wife and he was taken off the lisinopril with HCTZ and placed on metoprolol xr 50 mg yesterday by cardiology.  He continues to have significant left facial weakness.  He is not able to fully close his left eye but this has improved slightly.  He is doing some home facial physical therapy exercises.  He is using an eye ointment at night.  He has been taping his eye shut.  He is using eyedrops during the day.  He has some swelling under his left eye.  He has not had any recurrent stroke symptoms.  He is not having any dizziness at this time.  Blood pressure has been better controlled after discharge.  He is scheduled to follow-up with his primary care provider on 12/9/2021.  He did complete prednisone and valacyclovir for the suspected Bell's palsy.  We reviewed MRI of the brain imaging today, hospital notes  and neurology inpatient notes.  He did have a Holter monitor which he turned in yesterday.  He also reports having an EKG yesterday at his cardiologist office which showed no atrial fibrillation.  He also had echocardiogram in the hospital and this showed EF of 60%, normal cardiac valves and negative bubble study.  He also underwent CTA of the neck, CTA cerebral perfusion with the concerning finding of stroke, CTA of the head and neck were normal.  CT of the head showed no acute abnormalities.  The MRI of the brain with and without contrast is aware of the very small left cerebellar peduncle infarct was seen.    Also noted today is a tremor in both hands.  He is left-hand dominant.  He and his wife tell me that he started having a tremor in his right hand probably 10+ years ago and slowly over time it has also moved to the left hand.  He tells me he was previously treated with propranolol for this tremor.  He tells me that his mom had a tremor for many years but she did not have a diagnosis of Parkinson's.  He and his wife do tell me that he has noticed some shuffling in his gait and his wife is concerned that he does not  his feet.  No Parkinson's runs in his family.  He denies any significant constipation.  He denies any trouble with swallowing at this current time.  He does have a tremor at rest.  He also has a tremor with writing.  He also has a tremor with holding a cup of coffee. He has worsening tremor with walking.    Current outpatient and discharge medications have been reconciled for the patient.  Reviewed by: MARIE Lopez    The following portions of the patient's history were reviewed and updated as appropriate: allergies, current medications, past family history, past medical history, past social history, past surgical history and problem list.    Past Medical History:   Diagnosis Date   • Fistula-in-ano    • GERD (gastroesophageal reflux disease)    • Hyperlipidemia    •  Hypertension    • Hyperthyroidism    • Perirectal abscess    • Prostate cancer (HCC)        Past Surgical History:   Procedure Laterality Date   • CARPAL TUNNEL RELEASE         Social History     Socioeconomic History   • Marital status:    Tobacco Use   • Smoking status: Former Smoker   • Smokeless tobacco: Former User   Vaping Use   • Vaping Use: Never used   Substance and Sexual Activity   • Alcohol use: No   • Drug use: No   • Sexual activity: Not Currently       Family History   Problem Relation Age of Onset   • Heart disease Mother    • Diabetes Mother    • Heart disease Father    • Diabetes Father    • Cancer Father         Review of Systems   Constitutional: Negative for chills, fatigue, fever and unexpected weight change.   HENT: Negative for ear pain, hearing loss, nosebleeds, rhinorrhea and sore throat.    Eyes: Negative for photophobia, pain, discharge, itching and visual disturbance.   Respiratory: Negative for cough, chest tightness, shortness of breath and wheezing.    Cardiovascular: Negative for chest pain, palpitations and leg swelling.   Gastrointestinal: Negative for abdominal pain, blood in stool, constipation, diarrhea, nausea and vomiting.   Genitourinary: Negative for dysuria, frequency, hematuria and urgency.   Musculoskeletal: Positive for gait problem. Negative for arthralgias, back pain, joint swelling, myalgias, neck pain and neck stiffness.   Skin: Negative for rash and wound.   Allergic/Immunologic: Negative for environmental allergies and food allergies.   Neurological: Positive for tremors and weakness. Negative for dizziness, seizures, syncope, speech difficulty, light-headedness, numbness and headaches.   Hematological: Negative for adenopathy. Does not bruise/bleed easily.   Psychiatric/Behavioral: Negative for agitation, confusion, decreased concentration, hallucinations, sleep disturbance and suicidal ideas. The patient is not nervous/anxious.    All other systems  "reviewed and are negative.       Objective:  /80   Pulse 91   Temp 97.2 °F (36.2 °C)   Ht 177.8 cm (70\")   Wt 97.1 kg (214 lb)   SpO2 92%   BMI 30.71 kg/m²     Neurologic Exam     Mental Status   Oriented to person, place, and time.   Registration: recalls 3 of 3 objects. Recall at 5 minutes: recalls 3 of 3 objects. Follows 3 step commands.   Attention: normal. Concentration: normal.   Speech: speech is normal   Level of consciousness: alert  Knowledge: good and consistent with education. Able to perform simple calculations.   Able to name object. Able to read. Able to repeat. Able to write. Normal comprehension.     Cranial Nerves     CN II   Visual fields full to confrontation.     CN III, IV, VI   Pupils are equal, round, and reactive to light.  Extraocular motions are normal.     CN VII   Left facial weakness: central (marked left facial weakness, left eye some closure, unable to smile on left, left mouth leak, unable to raise left brow-House Brackmann score V severe)    CN VIII   CN VIII normal.     CN IX, X   CN IX normal.   CN X normal.     CN XI   CN XI normal.     CN XII   CN XII normal.     Motor Exam   Muscle bulk: normal  Overall muscle tone: normal    Strength   Strength 5/5 throughout.   No cogwheel rigidity noted     Sensory Exam   Light touch normal.   Vibration normal.   Proprioception normal.   Pinprick normal.     Gait, Coordination, and Reflexes     Gait  Gait: shuffling    Coordination   Romberg: negative  Finger to nose coordination: normal  Heel to shin coordination: normal  Tandem walking coordination: normal    Tremor   Resting tremor: present (left> right hand pill rolling tremor)  Intention tremor: present (mild to moderate BUE fine kinetic hand tremor)  Action tremor: absent    Reflexes   Right brachioradialis: 2+  Left brachioradialis: 2+  Right biceps: 2+  Left biceps: 2+  Right triceps: 2+  Left triceps: 2+  Right patellar: 2+  Left patellar: 2+  Right achilles: 2+  Left " achilles: 2+  Right : 2+  Left : 2+  Right plantar: normal  Left plantar: normal  Right Banda: absent  Left Banda: absent  Right ankle clonus: absent  Left ankle clonus: absent  Decreased finger taps left hand, normal right hand, normal both heels. Able to rise from chair without pushing up. Pill rolling tremor moderate in left hand with walking along with shuffling gait.       Physical Exam  Constitutional:       Appearance: Normal appearance.   Eyes:      Extraocular Movements: EOM normal.      Pupils: Pupils are equal, round, and reactive to light.   Cardiovascular:      Rate and Rhythm: Normal rate and regular rhythm.      Heart sounds: Normal heart sounds, S1 normal and S2 normal.   Pulmonary:      Effort: Pulmonary effort is normal.      Breath sounds: Normal breath sounds.   Neurological:      Mental Status: He is alert and oriented to person, place, and time.      Coordination: Finger-Nose-Finger Test, Heel to Shin Test and Romberg Test normal.      Gait: Tandem walk normal.      Deep Tendon Reflexes: Strength normal.      Reflex Scores:       Tricep reflexes are 2+ on the right side and 2+ on the left side.       Bicep reflexes are 2+ on the right side and 2+ on the left side.       Brachioradialis reflexes are 2+ on the right side and 2+ on the left side.       Patellar reflexes are 2+ on the right side and 2+ on the left side.       Achilles reflexes are 2+ on the right side and 2+ on the left side.  Psychiatric:         Mood and Affect: Mood and affect normal.         Speech: Speech normal.         Behavior: Behavior normal.         Thought Content: Thought content normal.         Cognition and Memory: Cognition and memory normal.         Judgment: Judgment normal.         Assessment/Plan:       Diagnoses and all orders for this visit:    1. Left-sided Bell's palsy (Primary)  Comments:  continue night eye ointment QHS w/ taping eye shut, cont. home face exercises, can call for PT,  monitor    2. 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 Plavix refilled by Cardiology Dr. Diaz    3. Tremor of both hands  Comments:  present 10+ years    4. Shuffling gait  Comments:  monitor, offered PT, declined, consider GWEN scan at follow up          Total time of visit 40 minutes including reviewing hospitalization records, obtaining history from the patient and his wife, completing exam, discussing plan of care moving forward along with documentation of today's visit. He should continue his home physical therapy exercises.  I did offer to have formal physical therapy evaluation but he and his wife would like to wait for now.  Dual antiplatelet therapy along with Lipitor.  I have requested records from cardiology to review.  He has noted tremor at rest and with movement today.  He tells me this has been present for 10+ years.  He and his wife have noticed progression.  Spiral drawing has some mild shaking.  He does have a noted shuffling gait.  I did discuss with the family that there are some parkinsonian features present.  Also with essential tremor which has been present for many years sometimes these features can be present.  Would consider DaTscan at follow-up for further evaluation.  Also offered physical therapy to work on gait and he has declined today.  He will follow-up in our clinic in 3 months or sooner if needed.  I did explain with suspected Bell's palsy and that this could take 6 months to 12 months to see additional improvement.  Sometimes with severe cases we do not see total resolution of facial weakness.    Reviewed medications, potential side effects and signs and symptoms to report. Discussed risk versus benefits of treatment plan with patient and/or family-including medications, labs and radiology that may be ordered. Addressed questions and concerns during visit. Patient and/or family verbalized  understanding and agree with plan.    AS THE PROVIDER, I PERSONALLY WORE PPE DURING ENTIRE FACE TO FACE ENCOUNTER IN CLINIC WITH THE PATIENT. PATIENT ALSO WORE PPE DURING ENTIRE FACE TO FACE ENCOUNTER EXCEPT FOR A MAX OF 30 SECONDS DURING NEUROLOGICAL EVALUATION OF CRANIAL NERVES AND THEN MASK WAS PLACED BACK OVER PATIENT FACE FOR REMAINDER OF VISIT. I WASHED MY HANDS BEFORE AND AFTER VISIT.    During this visit the following were done:  Labs Reviewed [x]    Labs Ordered []    Radiology Reports Reviewed [x]    Radiology Ordered []    PCP Records Reviewed [x]    Referring Provider Records Reviewed [x]    ER Records Reviewed [x]    Hospital Records Reviewed [x]    History Obtained From Family [x]    Radiology Images Reviewed [x]    Other Reviewed [x]    Records Requested [x]      Shanon Lozoya, MARIE  11/18/2021

## 2021-11-19 ENCOUNTER — READMISSION MANAGEMENT (OUTPATIENT)
Dept: CALL CENTER | Facility: HOSPITAL | Age: 81
End: 2021-11-19

## 2021-11-19 NOTE — OUTREACH NOTE
Stroke Week 3 Survey      Responses   [facility] patient discharged from? [place]   Does the patient have one of the following disease processes/diagnoses(primary or secondary)? Stroke (TIA)   Week 3 attempt successful? No   Unsuccessful attempts Attempt 2          Rachel Khan RN

## 2021-11-30 ENCOUNTER — TELEPHONE (OUTPATIENT)
Dept: NEUROLOGY | Facility: CLINIC | Age: 81
End: 2021-11-30

## 2021-12-09 ENCOUNTER — OFFICE VISIT (OUTPATIENT)
Dept: FAMILY MEDICINE CLINIC | Facility: CLINIC | Age: 81
End: 2021-12-09

## 2021-12-09 VITALS
WEIGHT: 219.4 LBS | SYSTOLIC BLOOD PRESSURE: 148 MMHG | OXYGEN SATURATION: 97 % | HEIGHT: 70 IN | HEART RATE: 79 BPM | BODY MASS INDEX: 31.41 KG/M2 | RESPIRATION RATE: 16 BRPM | TEMPERATURE: 98 F | DIASTOLIC BLOOD PRESSURE: 98 MMHG

## 2021-12-09 DIAGNOSIS — G51.0 LEFT-SIDED BELL'S PALSY: Primary | ICD-10-CM

## 2021-12-09 DIAGNOSIS — K21.9 GASTROESOPHAGEAL REFLUX DISEASE, UNSPECIFIED WHETHER ESOPHAGITIS PRESENT: ICD-10-CM

## 2021-12-09 DIAGNOSIS — R25.1 TREMOR OF BOTH HANDS: ICD-10-CM

## 2021-12-09 DIAGNOSIS — I10 ESSENTIAL HYPERTENSION, BENIGN: ICD-10-CM

## 2021-12-09 DIAGNOSIS — E03.8 OTHER SPECIFIED HYPOTHYROIDISM: ICD-10-CM

## 2021-12-09 DIAGNOSIS — I63.9 ACUTE CEREBROVASCULAR ACCIDENT (CVA) OF CEREBELLUM (HCC): ICD-10-CM

## 2021-12-09 DIAGNOSIS — E11.65 TYPE 2 DIABETES MELLITUS WITH HYPERGLYCEMIA, WITHOUT LONG-TERM CURRENT USE OF INSULIN (HCC): ICD-10-CM

## 2021-12-09 DIAGNOSIS — E78.5 HYPERLIPIDEMIA, UNSPECIFIED HYPERLIPIDEMIA TYPE: ICD-10-CM

## 2021-12-09 PROCEDURE — 99214 OFFICE O/P EST MOD 30 MIN: CPT | Performed by: FAMILY MEDICINE

## 2021-12-09 RX ORDER — OMEPRAZOLE 40 MG/1
40 CAPSULE, DELAYED RELEASE ORAL DAILY
Qty: 90 CAPSULE | Refills: 3 | Status: SHIPPED | OUTPATIENT
Start: 2021-12-09 | End: 2022-10-21

## 2021-12-09 RX ORDER — ATORVASTATIN CALCIUM 80 MG/1
80 TABLET, FILM COATED ORAL NIGHTLY
Qty: 90 TABLET | Refills: 3 | Status: SHIPPED | OUTPATIENT
Start: 2021-12-09 | End: 2022-06-22 | Stop reason: SINTOL

## 2021-12-09 RX ORDER — LEVOTHYROXINE SODIUM 0.15 MG/1
150 TABLET ORAL DAILY
Qty: 90 TABLET | Refills: 3 | Status: SHIPPED | OUTPATIENT
Start: 2021-12-09 | End: 2022-11-23 | Stop reason: SDUPTHER

## 2021-12-09 RX ORDER — METFORMIN HYDROCHLORIDE 500 MG/1
1000 TABLET, EXTENDED RELEASE ORAL
Qty: 180 TABLET | Refills: 3 | Status: SHIPPED | OUTPATIENT
Start: 2021-12-09 | End: 2022-11-23 | Stop reason: SDUPTHER

## 2021-12-09 NOTE — PROGRESS NOTES
Subjective   Vinnie Fulton is a 81 y.o. male    Chief Complaint    Stroke versus Bell's palsy  Hypertension  Hyperlipidemia  Type 2 diabetes  Hypothyroidism  Tremor    History of Present Illness  The patient was hospitalization at [facility] over [facility] with an admission on 10/29/2021 and discharge on 10/31/2021. There was a questionable small cerebellar stroke seen on MRI with left-sided facial paresis. This was felt by neurology to most likely represent Bell's palsy, long-standing tremor, and hands getting worse, and possible Parkinson's disease considered. Neurology is unclear whether this is most likely the case. Cardiac workup was negative, Holter was negative, and echocardiogram was negative. Blood pressure is noted to be elevated today at 148/98 mmHg.    He reports he has had better days. The patient said he was sent to Dr. Laura who found cancer on his prostate. He had 3 of 6 biopsies that were found to be positive. The patient was put on a female hormone injections because the patient does not want to do surgery at his age. He said the side effects are awful. The patient said he has hot flashes. He reports along the middle of the summer he started muscle pains and aches and joint pains. The patient could not even sit on the commode without help using his elbow. He took one and then in 08/2021 he had another injection in his abdomen and he was supposed to go back, but he does not think he is going to do it again. The patient said his PSA went from 12 to 0.55 ng/mL. When he went back in 08/2021 it was 0.35 ng/mL.    The patient said he got sick and went to see Juan and he was given meclizine and an antibiotic. He had just finished taking it when the stroke hit. The patient thinks the stroke was already working on him. He said the week or two before that he went out in the yard, and his neighbor had broken his leg, and he mowed his grass as well as the neighbor's grass. The next day he  started feeling funny and his sinuses were acting up. Then on 10/29/2021 his wife noticed his facial droop. He went to the emergency room and they were not sure if it was Bell's palsy or a little stroke. The patient said he had 2 MRIs on him, the first one they did not find anything, but the second one they did deeper and way back in his brain was a small lynn. He went to see a MAURO Padron in neurology, who wants to see him again in 03/2022.     He has been to see Dr. Diaz who is working with him. The patient had an EKG and a stress test and they cannot find anything wrong with his heart. The patient said he was taken off of the propranolol as well as the lisinopril. He was put on metoprolol 50 mg about a week or two ago. The patient said he gets shaking sometimes really bad. He will not see Dr. Diaz again until 03/2022.     He needs a refill of metformin and levothyroxine.    The patient said his blood pressure has been running high. He said his son told him it is probably too high. The patient said his blood pressure has been running in the 170s and around 107 mmHg. His blood pressure at check in today was 148/98 mmHg.     He said he feels weak. The patient is able to walk to the corner and back home, but he is fatigued.    The following portions of the patient's history were reviewed and updated as appropriate: allergies, current medications, past social history and problem list    Review of Systems   Constitutional: Negative for appetite change, chills, fatigue, fever and unexpected weight change.   Eyes: Negative for visual disturbance.   Respiratory: Negative for cough, chest tightness, shortness of breath and wheezing.    Cardiovascular: Negative for chest pain, palpitations and leg swelling.   Gastrointestinal: Negative for abdominal distention, abdominal pain, constipation, diarrhea, nausea and vomiting.        Patient experiencing heartburn/acid reflux     Endocrine: Negative for cold  intolerance, heat intolerance, polydipsia, polyphagia and polyuria.   Genitourinary: Negative for dysuria, frequency and urgency.   Musculoskeletal: Negative for arthralgias, back pain and myalgias.   Skin: Negative for color change and rash.   Neurological: Negative for dizziness, tremors, syncope, weakness and headaches.   Hematological: Negative for adenopathy. Does not bruise/bleed easily.   Psychiatric/Behavioral: Negative for agitation. The patient is not nervous/anxious.        Objective     Vitals:    12/09/21 1419   BP: 148/98   Pulse: 79   Resp: 16   Temp: 98 °F (36.7 °C)   SpO2: 97%       Physical Exam  Vitals and nursing note reviewed.   Constitutional:       General: He is not in acute distress.     Appearance: Normal appearance. He is well-developed. He is not ill-appearing, toxic-appearing or diaphoretic.   HENT:      Head: Normocephalic.   Eyes:      General: No scleral icterus.     Conjunctiva/sclera: Conjunctivae normal.      Pupils: Pupils are equal, round, and reactive to light.   Neck:      Thyroid: No thyromegaly.      Vascular: No carotid bruit or JVD.   Cardiovascular:      Rate and Rhythm: Normal rate and regular rhythm.      Pulses: Normal pulses.      Heart sounds: Normal heart sounds. No murmur heard.      Pulmonary:      Effort: Pulmonary effort is normal. No respiratory distress.      Breath sounds: Normal breath sounds. No wheezing or rales.   Abdominal:      General: Bowel sounds are normal. There is no distension.      Palpations: Abdomen is soft. There is no mass.      Tenderness: There is no abdominal tenderness. There is no guarding or rebound.      Hernia: No hernia is present.   Musculoskeletal:      Cervical back: Neck supple.   Lymphadenopathy:      Cervical: No cervical adenopathy.   Skin:     General: Skin is warm and dry.      Coloration: Skin is not jaundiced or pale.      Findings: No rash.   Neurological:      Mental Status: He is alert and oriented to person, place, and  time.      Sensory: No sensory deficit.   Psychiatric:         Behavior: Behavior normal.     RESULTS:   The patient's A1c was 7.3 percent on 10/30/2021.    Assessment/Plan   Problems Addressed this Visit        Cardiac and Vasculature    Essential hypertension, benign    Hyperlipidemia    Relevant Medications    atorvastatin (LIPITOR) 80 MG tablet       Endocrine and Metabolic    Hypothyroidism    Relevant Medications    levothyroxine (SYNTHROID, LEVOTHROID) 150 MCG tablet    Type 2 diabetes mellitus (HCC)    Relevant Medications    metFORMIN ER (GLUCOPHAGE-XR) 500 MG 24 hr tablet       Gastrointestinal Abdominal     Esophageal reflux    Relevant Medications    omeprazole (priLOSEC) 40 MG capsule       Neuro    Left-sided Bell's palsy - Primary    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)    Relevant Medications    atorvastatin (LIPITOR) 80 MG tablet    Tremor of both hands      Diagnoses       Codes Comments    Left-sided Bell's palsy    -  Primary ICD-10-CM: G51.0  ICD-9-CM: 351.0     Other specified hypothyroidism     ICD-10-CM: E03.8  ICD-9-CM: 244.8     Type 2 diabetes mellitus with hyperglycemia, without long-term current use of insulin (HCC)     ICD-10-CM: E11.65  ICD-9-CM: 250.00, 790.29     Gastroesophageal reflux disease, unspecified whether esophagitis present     ICD-10-CM: K21.9  ICD-9-CM: 530.81     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          Transcribed from ambient dictation for ARNEL Yoder MD by Nicki Contreras.  12/09/21   15:40 EST    Patient verbalized consent to the visit recording.  I have personally performed the services described in this document as transcribed by the above individual, and it is both accurate and complete.  ARNEL Yoder MD  12/9/2021  17:03 EST

## 2022-02-07 ENCOUNTER — OFFICE VISIT (OUTPATIENT)
Dept: FAMILY MEDICINE CLINIC | Facility: CLINIC | Age: 82
End: 2022-02-07

## 2022-02-07 VITALS
DIASTOLIC BLOOD PRESSURE: 90 MMHG | TEMPERATURE: 98 F | BODY MASS INDEX: 31.3 KG/M2 | RESPIRATION RATE: 15 BRPM | SYSTOLIC BLOOD PRESSURE: 152 MMHG | HEIGHT: 70 IN | OXYGEN SATURATION: 98 % | WEIGHT: 218.6 LBS | HEART RATE: 64 BPM

## 2022-02-07 DIAGNOSIS — E11.65 TYPE 2 DIABETES MELLITUS WITH HYPERGLYCEMIA, WITHOUT LONG-TERM CURRENT USE OF INSULIN: Primary | ICD-10-CM

## 2022-02-07 DIAGNOSIS — I10 ESSENTIAL HYPERTENSION, BENIGN: ICD-10-CM

## 2022-02-07 DIAGNOSIS — R25.1 TREMOR OF BOTH HANDS: ICD-10-CM

## 2022-02-07 DIAGNOSIS — I63.9 ACUTE CEREBROVASCULAR ACCIDENT (CVA) OF CEREBELLUM: ICD-10-CM

## 2022-02-07 LAB
EXPIRATION DATE: ABNORMAL
HBA1C MFR BLD: 7 %
Lab: ABNORMAL

## 2022-02-07 PROCEDURE — 83036 HEMOGLOBIN GLYCOSYLATED A1C: CPT | Performed by: FAMILY MEDICINE

## 2022-02-07 PROCEDURE — 99214 OFFICE O/P EST MOD 30 MIN: CPT | Performed by: FAMILY MEDICINE

## 2022-02-07 RX ORDER — LOSARTAN POTASSIUM 100 MG/1
100 TABLET ORAL DAILY
Qty: 90 TABLET | Refills: 3 | Status: SHIPPED | OUTPATIENT
Start: 2022-02-07 | End: 2022-11-23 | Stop reason: SDUPTHER

## 2022-02-07 NOTE — PROGRESS NOTES
Subjective   Vinnie Fulton is a 81 y.o. male    Chief Complaint    Hypertension    History of Present Illness  The patient presents today for evaluation of hypertension. He reports his blood pressure was good this morning at 150/88 mmHg. The patient said he fell in his driveway. He reports he bent over to get some salt and said he got top heavy and started falling. He said he reached to grab something, and he does not even know what he hit. The patient said he hit his head as well. He reports the only thing that is bothering him is his blood pressure. The patient said Dr. Diaz started him on metoprolol before he came here the last time. He reports he is on Plavix, Lipitor, and losartan. The patient said he was on lisinopril in 2012, but he was taken off of it. The patient said the reading here this morning is the best it has been in quite a while.    The patient said he has not had his blood sugar checked. He is still taking metformin.  Hemoglobin A1c today is 7.0%.    The following portions of the patient's history were reviewed and updated as appropriate: allergies, current medications, past social history and problem list    Review of Systems   Constitutional: Negative for appetite change, diaphoresis, fatigue and unexpected weight change.   Eyes: Negative for visual disturbance.   Respiratory: Negative for cough, chest tightness and shortness of breath.    Cardiovascular: Negative for chest pain, palpitations and leg swelling.   Gastrointestinal: Negative for diarrhea, nausea and vomiting.   Endocrine: Negative for polydipsia, polyphagia and polyuria.   Musculoskeletal: Positive for arthralgias and myalgias.   Skin: Negative for color change and rash.   Neurological: Negative for dizziness, syncope, weakness, light-headedness, numbness and headaches.   Hematological: Negative for adenopathy. Does not bruise/bleed easily.   Psychiatric/Behavioral: Negative for dysphoric mood. The patient is not  nervous/anxious.        Objective     Vitals:    02/07/22 0829   BP: 152/90   Pulse: 64   Resp: 15   Temp: 98 °F (36.7 °C)   SpO2: 98%       Physical Exam  Vitals and nursing note reviewed.   Constitutional:       General: He is not in acute distress.     Appearance: He is well-developed. He is obese. He is not ill-appearing, toxic-appearing or diaphoretic.   HENT:      Head: Normocephalic and atraumatic.   Eyes:      Conjunctiva/sclera: Conjunctivae normal.      Pupils: Pupils are equal, round, and reactive to light.   Neck:      Thyroid: No thyromegaly.      Vascular: No carotid bruit or JVD.   Cardiovascular:      Rate and Rhythm: Normal rate and regular rhythm.      Pulses: Normal pulses.      Heart sounds: Normal heart sounds. No murmur heard.      Pulmonary:      Effort: Pulmonary effort is normal. No respiratory distress.      Breath sounds: Normal breath sounds.   Abdominal:      Palpations: Abdomen is soft. There is no mass.      Tenderness: There is no abdominal tenderness.   Musculoskeletal:      Cervical back: Neck supple.      Right lower leg: No edema.      Left lower leg: No edema.   Lymphadenopathy:      Cervical: No cervical adenopathy.   Skin:     General: Skin is warm and dry.   Neurological:      Mental Status: He is alert and oriented to person, place, and time.      Sensory: No sensory deficit.   Psychiatric:         Mood and Affect: Mood normal.         Behavior: Behavior normal.         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)    Tremor of both hands      Diagnoses       Codes Comments    Type 2 diabetes mellitus with hyperglycemia, without long-term current use of insulin (HCC)    -  Primary ICD-10-CM:  E11.65  ICD-9-CM: 250.00, 790.29     Essential hypertension, benign     ICD-10-CM: I10  ICD-9-CM: 401.1     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         Transcribed from ambient dictation for ARNEL Yoder MD by Nicki Contreras.  02/07/22   09:46 EST    Patient verbalized consent to the visit recording.  I have personally performed the services described in this document as transcribed by the above individual, and it is both accurate and complete.  ARNEL Yoder MD  2/7/2022  16:10 EST

## 2022-06-22 ENCOUNTER — LAB (OUTPATIENT)
Dept: LAB | Facility: HOSPITAL | Age: 82
End: 2022-06-22

## 2022-06-22 ENCOUNTER — OFFICE VISIT (OUTPATIENT)
Dept: FAMILY MEDICINE CLINIC | Facility: CLINIC | Age: 82
End: 2022-06-22

## 2022-06-22 VITALS
HEIGHT: 70 IN | OXYGEN SATURATION: 94 % | TEMPERATURE: 98 F | RESPIRATION RATE: 15 BRPM | WEIGHT: 225 LBS | BODY MASS INDEX: 32.21 KG/M2

## 2022-06-22 DIAGNOSIS — G31.84 MILD COGNITIVE IMPAIRMENT WITH MEMORY LOSS: ICD-10-CM

## 2022-06-22 DIAGNOSIS — I95.1 ORTHOSTATIC HYPOTENSION: ICD-10-CM

## 2022-06-22 DIAGNOSIS — E03.8 OTHER SPECIFIED HYPOTHYROIDISM: ICD-10-CM

## 2022-06-22 DIAGNOSIS — E11.65 TYPE 2 DIABETES MELLITUS WITH HYPERGLYCEMIA, WITHOUT LONG-TERM CURRENT USE OF INSULIN: Primary | ICD-10-CM

## 2022-06-22 DIAGNOSIS — B35.1 ONYCHOMYCOSIS: ICD-10-CM

## 2022-06-22 DIAGNOSIS — T46.6X5A MYALGIA DUE TO STATIN: ICD-10-CM

## 2022-06-22 DIAGNOSIS — E11.65 TYPE 2 DIABETES MELLITUS WITH HYPERGLYCEMIA, WITHOUT LONG-TERM CURRENT USE OF INSULIN: ICD-10-CM

## 2022-06-22 DIAGNOSIS — M79.10 MYALGIA DUE TO STATIN: ICD-10-CM

## 2022-06-22 LAB
ALBUMIN SERPL-MCNC: 4.7 G/DL (ref 3.5–5.2)
ALBUMIN/GLOB SERPL: 1.9 G/DL
ALP SERPL-CCNC: 70 U/L (ref 39–117)
ALT SERPL W P-5'-P-CCNC: 12 U/L (ref 1–41)
ANION GAP SERPL CALCULATED.3IONS-SCNC: 11.8 MMOL/L (ref 5–15)
AST SERPL-CCNC: 20 U/L (ref 1–40)
BILIRUB SERPL-MCNC: 0.6 MG/DL (ref 0–1.2)
BUN SERPL-MCNC: 27 MG/DL (ref 8–23)
BUN/CREAT SERPL: 25 (ref 7–25)
CALCIUM SPEC-SCNC: 8.8 MG/DL (ref 8.6–10.5)
CHLORIDE SERPL-SCNC: 99 MMOL/L (ref 98–107)
CO2 SERPL-SCNC: 26.2 MMOL/L (ref 22–29)
CREAT SERPL-MCNC: 1.08 MG/DL (ref 0.76–1.27)
EGFRCR SERPLBLD CKD-EPI 2021: 68.9 ML/MIN/1.73
EXPIRATION DATE: ABNORMAL
GLOBULIN UR ELPH-MCNC: 2.5 GM/DL
GLUCOSE SERPL-MCNC: 139 MG/DL (ref 65–99)
HBA1C MFR BLD: 7.2 %
Lab: ABNORMAL
MAGNESIUM SERPL-MCNC: 1.9 MG/DL (ref 1.6–2.4)
POTASSIUM SERPL-SCNC: 4.8 MMOL/L (ref 3.5–5.2)
PROT SERPL-MCNC: 7.2 G/DL (ref 6–8.5)
SODIUM SERPL-SCNC: 137 MMOL/L (ref 136–145)
T4 FREE SERPL-MCNC: 1.66 NG/DL (ref 0.93–1.7)
TSH SERPL DL<=0.05 MIU/L-ACNC: 0.71 UIU/ML (ref 0.27–4.2)

## 2022-06-22 PROCEDURE — 83036 HEMOGLOBIN GLYCOSYLATED A1C: CPT | Performed by: FAMILY MEDICINE

## 2022-06-22 PROCEDURE — 84443 ASSAY THYROID STIM HORMONE: CPT

## 2022-06-22 PROCEDURE — 36415 COLL VENOUS BLD VENIPUNCTURE: CPT

## 2022-06-22 PROCEDURE — 83735 ASSAY OF MAGNESIUM: CPT

## 2022-06-22 PROCEDURE — 80053 COMPREHEN METABOLIC PANEL: CPT

## 2022-06-22 PROCEDURE — 99214 OFFICE O/P EST MOD 30 MIN: CPT | Performed by: FAMILY MEDICINE

## 2022-06-22 PROCEDURE — 84439 ASSAY OF FREE THYROXINE: CPT

## 2022-06-22 RX ORDER — TERBINAFINE HYDROCHLORIDE 250 MG/1
250 TABLET ORAL DAILY
Qty: 90 TABLET | Refills: 0 | Status: SHIPPED | OUTPATIENT
Start: 2022-06-22 | End: 2022-09-20

## 2022-06-22 RX ORDER — DOXAZOSIN 2 MG/1
2 TABLET ORAL NIGHTLY
COMMUNITY
End: 2022-11-23

## 2022-06-22 RX ORDER — METOPROLOL SUCCINATE 100 MG/1
100 TABLET, EXTENDED RELEASE ORAL DAILY
Qty: 90 TABLET | Refills: 3
Start: 2022-06-22 | End: 2022-08-24

## 2022-06-22 NOTE — PROGRESS NOTES
Subjective   Vinnie Fulton is a 81 y.o. male    Chief Complaint  Dizziness  Diabetes mellitus    History of Present Illness    The patient is complaining of some orthostatic type dizziness, primarily when he gets up. He is on antihypertensive medications. The patient's hemoglobin A1c today is 7.2 percent. Six months ago, it was 7.0 percent.     The patient reports he had a spell approximately 1 month ago and his son told him to check his blood pressure. He adds his blood pressure was 90/59 mmHg and he rechecked his blood pressure the reading was 85/50 mmHg. The patient notes diastolic blood pressure is usually 100 mmHg. He states he becomes, hot, he sweats, and he feels as if he will experience syncope. The patient denies any swelling in his lower extremities. He notes he discontinued the statin he was taking previously. The patient complains he continues to experience muscle pain in his back, lower extremities, and upper extremities. He states he wishes he could do something about his pain; unfortunately, he is unsure what to do to improve his pain. He adds his pain is partially improved; unfortunately, his pain remains.     The patient reports he has toenail fungus and is requesting a prescription for this. He notes while he was in [place] the salt water from the ocean was helpful in regards to his toenail fungus.     He complains his memory is worsening. The patient says he bought a new self-propelling  and it makes mowing much easier for him. The patient reports he has an appointment with Dr. Laura in 09/2022.      He complains of knee pain and he has difficulty getting up from a seated position and sitting down. The patient also struggles with balance secondary to his knee pain.      The following portions of the patient's history were reviewed and updated as appropriate: allergies, current medications, past social history and problem list    Review of Systems   Constitutional: Negative for  appetite change, diaphoresis, fatigue and unexpected weight change.   Eyes: Negative for visual disturbance.   Respiratory: Negative for cough, chest tightness and shortness of breath.    Cardiovascular: Negative for chest pain, palpitations and leg swelling.   Gastrointestinal: Negative for constipation, diarrhea, nausea and vomiting.   Endocrine: Negative for cold intolerance, heat intolerance, polydipsia, polyphagia and polyuria.   Skin: Negative for color change and rash.   Neurological: Negative for dizziness, tremors, syncope, weakness, light-headedness, numbness and headaches.   Psychiatric/Behavioral: Negative for agitation. The patient is not nervous/anxious.        Objective     Vitals:    06/22/22 1103   Resp: 15   Temp: 98 °F (36.7 °C)   SpO2: 94%       Physical Exam  Vitals and nursing note reviewed.   Constitutional:       General: He is not in acute distress.     Appearance: Normal appearance. He is well-developed. He is not ill-appearing, toxic-appearing or diaphoretic.   HENT:      Head: Normocephalic and atraumatic.   Eyes:      Comments: No exopthalmos noted   Neck:      Thyroid: No thyroid mass, thyromegaly or thyroid tenderness.      Vascular: No carotid bruit or JVD.   Cardiovascular:      Rate and Rhythm: Normal rate and regular rhythm.      Pulses: Normal pulses.      Heart sounds: Normal heart sounds. No murmur heard.  Pulmonary:      Effort: Pulmonary effort is normal. No respiratory distress.      Breath sounds: Normal breath sounds.   Abdominal:      Palpations: Abdomen is soft. There is no mass.      Tenderness: There is no abdominal tenderness.   Musculoskeletal:      Cervical back: Neck supple.   Lymphadenopathy:      Cervical: No cervical adenopathy.   Skin:     General: Skin is warm and dry.   Neurological:      Mental Status: He is alert and oriented to person, place, and time.      Sensory: No sensory deficit.      Coordination: Coordination normal.   Psychiatric:         Mood and  Affect: Mood normal.         Behavior: Behavior normal.         Assessment & Plan   Problems Addressed this Visit        Cardiac and Vasculature    Orthostatic hypotension    Relevant Orders    Comprehensive Metabolic Panel    TSH    T4, Free    Magnesium       Endocrine and Metabolic    Hypothyroidism    Relevant Medications    metoprolol succinate XL (Toprol XL) 100 MG 24 hr tablet    Other Relevant Orders    TSH    T4, Free    Type 2 diabetes mellitus (HCC) - Primary    Relevant Orders    POC Glycosylated Hemoglobin (Hb A1C) (Completed)    Comprehensive Metabolic Panel      Other Visit Diagnoses     Myalgia due to statin        Onychomycosis        Relevant Medications    terbinafine (lamiSIL) 250 MG tablet    Mild cognitive impairment with memory loss          Diagnoses       Codes Comments    Type 2 diabetes mellitus with hyperglycemia, without long-term current use of insulin (HCC)    -  Primary ICD-10-CM: E11.65  ICD-9-CM: 250.00, 790.29     Orthostatic hypotension     ICD-10-CM: I95.1  ICD-9-CM: 458.0     Myalgia due to statin     ICD-10-CM: M79.10, T46.6X5A  ICD-9-CM: 729.1, E942.2     Onychomycosis     ICD-10-CM: B35.1  ICD-9-CM: 110.1     Other specified hypothyroidism     ICD-10-CM: E03.8  ICD-9-CM: 244.8     Mild cognitive impairment with memory loss     ICD-10-CM: G31.84  ICD-9-CM: 331.83         I spent 30 minutes in patient care: Reviewing records prior to the visit, examining the patient, entering orders and documentation    Part of this note may be an electronic transcription/translation of spoken language to printed text using the Dragon Dictation System.           Transcribed from ambient dictation for ARNEL Yoder MD by Wiley Jones .  06/22/22   13:48 EDT    Patient verbalized consent to the visit recording.

## 2022-08-24 ENCOUNTER — OFFICE VISIT (OUTPATIENT)
Dept: FAMILY MEDICINE CLINIC | Facility: CLINIC | Age: 82
End: 2022-08-24

## 2022-08-24 VITALS
HEART RATE: 62 BPM | HEIGHT: 70 IN | OXYGEN SATURATION: 97 % | WEIGHT: 231.4 LBS | DIASTOLIC BLOOD PRESSURE: 80 MMHG | BODY MASS INDEX: 33.13 KG/M2 | RESPIRATION RATE: 16 BRPM | TEMPERATURE: 96.6 F | SYSTOLIC BLOOD PRESSURE: 140 MMHG

## 2022-08-24 DIAGNOSIS — M15.9 GENERALIZED OSTEOARTHROSIS, INVOLVING MULTIPLE SITES: ICD-10-CM

## 2022-08-24 DIAGNOSIS — I95.1 ORTHOSTATIC HYPOTENSION: ICD-10-CM

## 2022-08-24 DIAGNOSIS — I10 ESSENTIAL HYPERTENSION, BENIGN: ICD-10-CM

## 2022-08-24 DIAGNOSIS — E11.65 TYPE 2 DIABETES MELLITUS WITH HYPERGLYCEMIA, WITHOUT LONG-TERM CURRENT USE OF INSULIN: Primary | ICD-10-CM

## 2022-08-24 LAB
EXPIRATION DATE: NORMAL
HBA1C MFR BLD: 6.3 %
Lab: NORMAL

## 2022-08-24 PROCEDURE — 99214 OFFICE O/P EST MOD 30 MIN: CPT | Performed by: FAMILY MEDICINE

## 2022-08-24 RX ORDER — METOPROLOL SUCCINATE 50 MG/1
50 TABLET, EXTENDED RELEASE ORAL DAILY
Qty: 30 TABLET | Refills: 5 | Status: SHIPPED | OUTPATIENT
Start: 2022-08-24 | End: 2022-11-23

## 2022-08-24 NOTE — PROGRESS NOTES
Subjective   Vinnie Fulton is a 82 y.o. male    Chief Complaint    Diabetes mellitus    History of Present Illness  The patient is here for a follow-up regarding his diabetes. At his visit in 06/2022, his A1c was 7.2 percent. Today, his A1c is down to 6.3 percent.    He reports he is doing well and has been trying to lose weight. He reports he is having problems with fatigue and shortness of breath when he tries to go out and do things. He is not sure if it has anything to do with his heart. He reports he has been noticing it when he mows the grass and he has to stop several times to sit down and rest. He reports that his systolic blood pressure at home has been in the 50s mmHg. He reports he last saw Dr. Diaz in 04/2022, and is due to go back in 11/2022.     He confirms that he has stopped taking the lisinopril-hydrochlorothiazide and is doing better. He reports he was not able to do his exercises as his back has been giving him trouble the last couple of days. He took Tylenol last night and is feeling better. He reports he has been trying to walk approximately 1 mile every day but gets dizzy and has to sit down. He reports he has to watch out because he may even pass out. He states he gets in the garden picking and he has to hold onto the fence when he stands back up.    The following portions of the patient's history were reviewed and updated as appropriate: allergies, current medications, past social history and problem list    Review of Systems   Constitutional: Negative for appetite change, diaphoresis, fatigue and unexpected weight change.   Eyes: Negative for visual disturbance.   Respiratory: Negative for cough, chest tightness and shortness of breath.    Cardiovascular: Negative for chest pain, palpitations and leg swelling.   Gastrointestinal: Negative for diarrhea, nausea and vomiting.   Endocrine: Negative for polydipsia, polyphagia and polyuria.   Skin: Negative for color change and rash.    Neurological: Negative for dizziness, syncope, weakness, light-headedness, numbness and headaches.       Objective     Vitals:    08/24/22 1048   BP: 140/80   Pulse: 62   Resp: 16   Temp: 96.6 °F (35.9 °C)   SpO2: 97%       Physical Exam  Vitals and nursing note reviewed.   Constitutional:       General: He is not in acute distress.     Appearance: Normal appearance. He is well-developed. He is not ill-appearing, toxic-appearing or diaphoretic.   Neck:      Thyroid: No thyromegaly.      Vascular: No carotid bruit or JVD.   Cardiovascular:      Rate and Rhythm: Normal rate and regular rhythm.      Pulses: Normal pulses.      Heart sounds: Normal heart sounds. No murmur heard.  Pulmonary:      Effort: Pulmonary effort is normal. No respiratory distress.      Breath sounds: Normal breath sounds.   Abdominal:      Palpations: Abdomen is soft. There is no mass.      Tenderness: There is no abdominal tenderness.   Musculoskeletal:      Cervical back: Neck supple.   Lymphadenopathy:      Cervical: No cervical adenopathy.   Skin:     General: Skin is warm and dry.   Neurological:      Mental Status: He is alert.      Sensory: No sensory deficit.         Assessment & Plan   Problems Addressed this Visit        Cardiac and Vasculature    Essential hypertension, benign    Relevant Medications    metoprolol succinate XL (Toprol XL) 50 MG 24 hr tablet    Orthostatic hypotension       Endocrine and Metabolic    Type 2 diabetes mellitus (HCC) - Primary    Relevant Orders    POC Glycosylated Hemoglobin (Hb A1C) (Completed)       Musculoskeletal and Injuries    Generalized osteoarthrosis, involving multiple sites      Diagnoses       Codes Comments    Type 2 diabetes mellitus with hyperglycemia, without long-term current use of insulin (HCC)    -  Primary ICD-10-CM: E11.65  ICD-9-CM: 250.00, 790.29     Orthostatic hypotension     ICD-10-CM: I95.1  ICD-9-CM: 458.0     Essential hypertension, benign     ICD-10-CM: I10  ICD-9-CM:  401.1     Generalized osteoarthrosis, involving multiple sites     ICD-10-CM: M15.9  ICD-9-CM: 715.09         I spent 30 minutes in patient care: Reviewing records prior to the visit, examining the patient, entering orders and documentation    Part of this note may be an electronic transcription/translation of spoken language to printed text using the Dragon Dictation System.         Transcribed from ambient dictation for ARNEL Yoder MD by TANISHA GANDHI.  08/24/22   12:21 EDT    Patient verbalized consent to the visit recording.    I have personally performed the services described in this document as transcribed by the above individual, and it is both accurate and complete.  ARNEL Yoder MD  8/24/2022  21:36 EDT

## 2022-10-21 DIAGNOSIS — K21.9 GASTROESOPHAGEAL REFLUX DISEASE, UNSPECIFIED WHETHER ESOPHAGITIS PRESENT: ICD-10-CM

## 2022-10-21 RX ORDER — OMEPRAZOLE 40 MG/1
40 CAPSULE, DELAYED RELEASE ORAL DAILY
Qty: 90 CAPSULE | Refills: 3 | Status: SHIPPED | OUTPATIENT
Start: 2022-10-21 | End: 2022-11-23 | Stop reason: SDUPTHER

## 2022-10-24 ENCOUNTER — OFFICE VISIT (OUTPATIENT)
Dept: FAMILY MEDICINE CLINIC | Facility: CLINIC | Age: 82
End: 2022-10-24

## 2022-10-24 VITALS
TEMPERATURE: 98 F | HEIGHT: 70 IN | DIASTOLIC BLOOD PRESSURE: 92 MMHG | OXYGEN SATURATION: 96 % | RESPIRATION RATE: 16 BRPM | SYSTOLIC BLOOD PRESSURE: 162 MMHG | WEIGHT: 233.2 LBS | HEART RATE: 88 BPM | BODY MASS INDEX: 33.39 KG/M2

## 2022-10-24 DIAGNOSIS — M15.9 GENERALIZED OSTEOARTHROSIS, INVOLVING MULTIPLE SITES: ICD-10-CM

## 2022-10-24 DIAGNOSIS — R25.1 TREMOR OF BOTH HANDS: ICD-10-CM

## 2022-10-24 DIAGNOSIS — K21.9 GASTROESOPHAGEAL REFLUX DISEASE, UNSPECIFIED WHETHER ESOPHAGITIS PRESENT: ICD-10-CM

## 2022-10-24 DIAGNOSIS — E03.8 OTHER SPECIFIED HYPOTHYROIDISM: ICD-10-CM

## 2022-10-24 DIAGNOSIS — C61 PROSTATE CANCER: ICD-10-CM

## 2022-10-24 DIAGNOSIS — Z00.00 MEDICARE ANNUAL WELLNESS VISIT, SUBSEQUENT: Primary | ICD-10-CM

## 2022-10-24 DIAGNOSIS — I10 ESSENTIAL HYPERTENSION, BENIGN: ICD-10-CM

## 2022-10-24 DIAGNOSIS — E11.65 TYPE 2 DIABETES MELLITUS WITH HYPERGLYCEMIA, WITHOUT LONG-TERM CURRENT USE OF INSULIN: ICD-10-CM

## 2022-10-24 PROCEDURE — 1170F FXNL STATUS ASSESSED: CPT | Performed by: FAMILY MEDICINE

## 2022-10-24 PROCEDURE — 1159F MED LIST DOCD IN RCRD: CPT | Performed by: FAMILY MEDICINE

## 2022-10-24 PROCEDURE — G0439 PPPS, SUBSEQ VISIT: HCPCS | Performed by: FAMILY MEDICINE

## 2022-10-24 NOTE — PROGRESS NOTES
The ABCs of the Annual Wellness Visit  Subsequent Medicare Wellness Visit    Chief Complaint   Patient presents with   • Medicare Wellness-subsequent      Subjective   History of Present Illness:  Vinnie Fulton is a 82 y.o. male who presents for a Subsequent Medicare Wellness Visit.    Also here for blood pressure follow-up.  At his last visit here he was having orthostatic symptoms so I decreased his metoprolol from 100 mg to 50 mg daily.  Unfortunately his blood pressure has gone up.  He brings in a log today that shows systolic pressures anywhere from 159-196 and diastolic pressures .  Starting 2 days ago he increased his metoprolol to 100 mg daily again.  No response as of yet.    The following portions of the patient's history were reviewed and   updated as appropriate: allergies, current medications, past family history, past medical history, past social history, past surgical history and problem list.    Compared to one year ago, the patient feels his physical   health is worse.    Compared to one year ago, the patient feels his mental   health is the same.    Recent Hospitalizations:  This patient has had a [facility] admission record on file within the last 365 days.    Current Medical Providers:  Patient Care Team:  Ivan Yoder MD as PCP - General  Ivan Yoder MD as PCP - Family Medicine    Outpatient Medications Prior to Visit   Medication Sig Dispense Refill   • ascorbic acid (VITAMIN C) 1000 MG tablet Take 1 tablet by mouth Daily.  1   • aspirin 81 MG EC tablet Take 81 mg by mouth daily.     • clopidogrel (PLAVIX) 75 MG tablet Take 1 tablet by mouth Daily. 21 tablet 0   • doxazosin (CARDURA) 2 MG tablet Take 2 mg by mouth Every Night.     • levothyroxine (SYNTHROID, LEVOTHROID) 150 MCG tablet Take 1 tablet by mouth Daily. 90 tablet 3   • losartan (Cozaar) 100 MG tablet Take 1 tablet by mouth Daily. 90 tablet 3   • meclizine (ANTIVERT) 25 MG tablet Take 1  "tablet by mouth 3 (Three) Times a Day As Needed for Dizziness. 21 tablet 0   • metFORMIN ER (GLUCOPHAGE-XR) 500 MG 24 hr tablet Take 2 tablets by mouth Daily With Breakfast. 180 tablet 3   • metoprolol succinate XL (Toprol XL) 50 MG 24 hr tablet Take 1 tablet by mouth Daily. 30 tablet 5   • naproxen sodium (ALEVE) 220 MG tablet Every 12 (Twelve) Hours.     • omeprazole (priLOSEC) 40 MG capsule TAKE 1 CAPSULE BY MOUTH DAILY 90 capsule 3     No facility-administered medications prior to visit.       No opioid medication identified on active medication list. I have reviewed chart for other potential  high risk medication/s and harmful drug interactions in the elderly.          Aspirin is on active medication list. Aspirin use is indicated based on review of current medical condition/s. Pros and cons of this therapy have been discussed today. Benefits of this medication outweigh potential harm.  Patient has been encouraged to continue taking this medication.  .      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 Recommendations include: nutrition counseling    Does the patient have evidence of cognitive impairment? No    Physical Exam  Lab Results   Component Value Date    HGBA1C 6.3 2022            HEALTH RISK ASSESSMENT    Smoking Status:  Social History     Tobacco Use   Smoking Status Former   • Packs/day: 1.00   • Years: 1.00   • Pack years: 1.00   • Types: Cigarettes   • Quit date: 2000   • Years since quittin.8   Smokeless Tobacco Former     Alcohol Consumption:  Social History     Substance and Sexual Activity   Alcohol Use No     Fall Risk Screen:    STEADI Fall Risk Assessment was completed, and patient is at LOW risk for falls.Assessment completed on:10/24/2022    Depression Screening:  PHQ-2/PHQ-9 Depression Screening 10/24/2022   Retired Total Score -   Little Interest or Pleasure in Doing Things 0-->not at all   Feeling Down, Depressed or Hopeless 0-->not at all   PHQ-9: Brief Depression Severity Measure Score 0       Health Habits and Functional and Cognitive Screening:  Functional & Cognitive Status 10/24/2022   Do you have difficulty preparing food and eating? No   Do you have difficulty bathing yourself, getting dressed or grooming yourself? No   Do you have difficulty using the toilet? No   Do you have difficulty moving around from place to place? No   Do you have trouble with steps or getting out of a bed or a chair? No   Current Diet Well Balanced Diet   Dental Exam -   Eye Exam -   Exercise (times per week) -   Current Exercises Include No Regular Exercise   Current Exercise Activities Include -   Do you need help using the phone?  No   Are you deaf or do you have serious difficulty hearing?  No   Do you need help with transportation? No   Do you need help shopping? No   Do you need help preparing meals?  No   Do you need help with housework?  No   Do you need help with laundry? No   Do you need help taking your medications? No   Do you need help managing money? No   Do you  ever drive or ride in a car without wearing a seat belt? No   Have you felt unusual stress, anger or loneliness in the last month? No   Who do you live with? Spouse   If you need help, do you have trouble finding someone available to you? No   Have you been bothered in the last four weeks by sexual problems? No   Do you have difficulty concentrating, remembering or making decisions? No       Age-appropriate Screening Schedule:  Refer to the list below for future screening recommendations based on patient's age, sex and/or medical conditions. Orders for these recommended tests are listed in the plan section. The patient has been provided with a written plan.    Health Maintenance   Topic Date Due   • URINE MICROALBUMIN  Never done   • DIABETIC EYE EXAM  12/02/2021   • INFLUENZA VACCINE  08/01/2022   • ZOSTER VACCINE (1 of 2) 10/24/2022 (Originally 7/10/1990)   • LIPID PANEL  10/30/2022   • HEMOGLOBIN A1C  02/24/2023              Assessment & Plan     CMS Preventative Services Quick Reference  Risk Factors Identified During Encounter  Cardiovascular Disease  Chronic Pain   Obesity/Overweight   The above risks/problems have been discussed with the patient.  Follow up actions/plans if indicated are seen below in the Assessment/Plan Section.  Pertinent information has been shared with the patient in the After Visit Summary.    Diagnoses and all orders for this visit:    1. Medicare annual wellness visit, subsequent (Primary)    2. Essential hypertension, benign    3. Type 2 diabetes mellitus with hyperglycemia, without long-term current use of insulin (HCC)    4. Generalized osteoarthrosis, involving multiple sites    5. Gastroesophageal reflux disease, unspecified whether esophagitis present    6. Other specified hypothyroidism    7. Prostate cancer (HCC)    8. Tremor of both hands        Follow Up:  Return in about 1 month (around 11/24/2022) for Recheck.     An After Visit Summary and PPPS were given to the  patient.    Increase Toprol-XL to 100 mg daily (done).  If blood pressure remains elevated will add Toprol-XL 50 mg in the evening.

## 2022-11-15 DIAGNOSIS — E03.8 OTHER SPECIFIED HYPOTHYROIDISM: ICD-10-CM

## 2022-11-15 NOTE — TELEPHONE ENCOUNTER
Caller: KirstinVinnie    Relationship: Self    Best call back number:     [phone]    Requested Prescriptions:   Requested Prescriptions     Pending Prescriptions Disp Refills   • clopidogrel (PLAVIX) 75 MG tablet 21 tablet 0     Sig: Take 1 tablet by mouth Daily.      THYROID MEDICATION    PATIENT DID NOT RECALL THE NAME OF THIS MEDICATION    Pharmacy where request should be sent: [id number] [place] [address] [phone] [facility] [phone] FX     Additional details provided by patient:     PATIENT STATED HE HAS (1 - 2) TABLETS LEFT OF THE PLAVIX    Does the patient have less than a 3 day supply:  [x] Yes  [] No    Segundo Montejo Rep   11/15/22 16:21 EST     DR JONES

## 2022-11-16 RX ORDER — CLOPIDOGREL BISULFATE 75 MG/1
75 TABLET ORAL DAILY
Qty: 21 TABLET | Refills: 0 | Status: SHIPPED | OUTPATIENT
Start: 2022-11-16 | End: 2022-11-23 | Stop reason: SDUPTHER

## 2022-11-23 ENCOUNTER — OFFICE VISIT (OUTPATIENT)
Dept: FAMILY MEDICINE CLINIC | Facility: CLINIC | Age: 82
End: 2022-11-23

## 2022-11-23 VITALS
BODY MASS INDEX: 32.93 KG/M2 | TEMPERATURE: 96.5 F | WEIGHT: 230 LBS | DIASTOLIC BLOOD PRESSURE: 100 MMHG | OXYGEN SATURATION: 95 % | RESPIRATION RATE: 15 BRPM | SYSTOLIC BLOOD PRESSURE: 168 MMHG | HEART RATE: 79 BPM | HEIGHT: 70 IN

## 2022-11-23 DIAGNOSIS — M47.816 SPONDYLOSIS OF LUMBAR REGION WITHOUT MYELOPATHY OR RADICULOPATHY: Primary | ICD-10-CM

## 2022-11-23 DIAGNOSIS — K21.9 GASTROESOPHAGEAL REFLUX DISEASE, UNSPECIFIED WHETHER ESOPHAGITIS PRESENT: ICD-10-CM

## 2022-11-23 DIAGNOSIS — I10 ESSENTIAL HYPERTENSION, BENIGN: ICD-10-CM

## 2022-11-23 DIAGNOSIS — E11.65 TYPE 2 DIABETES MELLITUS WITH HYPERGLYCEMIA, WITHOUT LONG-TERM CURRENT USE OF INSULIN: ICD-10-CM

## 2022-11-23 DIAGNOSIS — M15.9 GENERALIZED OSTEOARTHROSIS, INVOLVING MULTIPLE SITES: ICD-10-CM

## 2022-11-23 DIAGNOSIS — E03.8 OTHER SPECIFIED HYPOTHYROIDISM: ICD-10-CM

## 2022-11-23 PROCEDURE — 99214 OFFICE O/P EST MOD 30 MIN: CPT | Performed by: FAMILY MEDICINE

## 2022-11-23 RX ORDER — DOXAZOSIN MESYLATE 4 MG/1
4 TABLET ORAL NIGHTLY
Qty: 30 TABLET | Refills: 5 | Status: SHIPPED | OUTPATIENT
Start: 2022-11-23

## 2022-11-23 RX ORDER — OMEPRAZOLE 40 MG/1
40 CAPSULE, DELAYED RELEASE ORAL DAILY
Qty: 90 CAPSULE | Refills: 3 | Status: SHIPPED | OUTPATIENT
Start: 2022-11-23 | End: 2023-03-19 | Stop reason: HOSPADM

## 2022-11-23 RX ORDER — LEVOTHYROXINE SODIUM 0.15 MG/1
150 TABLET ORAL DAILY
Qty: 90 TABLET | Refills: 3 | Status: SHIPPED | OUTPATIENT
Start: 2022-11-23

## 2022-11-23 RX ORDER — METFORMIN HYDROCHLORIDE 500 MG/1
1000 TABLET, EXTENDED RELEASE ORAL
Qty: 180 TABLET | Refills: 3 | Status: SHIPPED | OUTPATIENT
Start: 2022-11-23

## 2022-11-23 RX ORDER — CLOPIDOGREL BISULFATE 75 MG/1
75 TABLET ORAL DAILY
Qty: 30 TABLET | Refills: 5 | Status: SHIPPED | OUTPATIENT
Start: 2022-11-23

## 2022-11-23 RX ORDER — LOSARTAN POTASSIUM 100 MG/1
100 TABLET ORAL DAILY
Qty: 90 TABLET | Refills: 3 | Status: SHIPPED | OUTPATIENT
Start: 2022-11-23

## 2022-11-23 RX ORDER — METOPROLOL TARTRATE 100 MG/1
100 TABLET ORAL 2 TIMES DAILY
Qty: 60 TABLET | Refills: 5 | Status: SHIPPED | OUTPATIENT
Start: 2022-11-23

## 2022-11-23 NOTE — PROGRESS NOTES
Subjective   Vinnie Fulton is a 82 y.o. male    Hypertension  Hypothyroid    History of Present Illness   Recent adjustments in antihypertensive medications. Here for follow-up. Blood pressure today 168/100. He is currently on Toprol 100 mg in the morning, 50 mg at night, losartan 100 mg daily and doxazosin 2 mg each night.    The patient reports he needs all of his medications refilled. He has been without his Synthroid since Saturday. He took his blood pressure one day and it was 147/100 mmHg and then the next day it was 200 mmHg. His blood pressure today is 160/100 mmHg.    The following portions of the patient's history were reviewed and updated as appropriate: allergies, current medications, past social history and problem list    Review of Systems   Constitutional: Negative.  Negative for appetite change, diaphoresis, fatigue and unexpected weight change.   Eyes: Negative for visual disturbance.   Respiratory: Negative.  Negative for cough, chest tightness and shortness of breath.    Cardiovascular: Negative for chest pain, palpitations and leg swelling.   Gastrointestinal: Negative.  Negative for abdominal distention, abdominal pain, constipation, diarrhea, nausea and vomiting.        Patient experiencing heartburn/acid reflux     Endocrine: Negative for cold intolerance, heat intolerance, polydipsia, polyphagia and polyuria.   Musculoskeletal: Positive for back pain. Negative for arthralgias, gait problem and myalgias.   Skin: Negative for color change and rash.   Neurological: Negative for dizziness, tremors, syncope, weakness, light-headedness, numbness and headaches.   Psychiatric/Behavioral: Negative for agitation, behavioral problems and dysphoric mood. The patient is not nervous/anxious.        Objective     Vitals:    11/23/22 0959   BP: 168/100   Pulse: 79   Resp: 15   Temp: 96.5 °F (35.8 °C)   SpO2: 95%       Physical Exam  Vitals and nursing note reviewed.   Constitutional:       General: He is  not in acute distress.     Appearance: Normal appearance. He is well-developed. He is not ill-appearing, toxic-appearing or diaphoretic.   HENT:      Head: Normocephalic and atraumatic.   Eyes:      General: No scleral icterus.     Conjunctiva/sclera: Conjunctivae normal.      Comments: No exopthalmos noted   Neck:      Thyroid: No thyroid mass, thyromegaly or thyroid tenderness.      Vascular: No carotid bruit or JVD.   Cardiovascular:      Rate and Rhythm: Normal rate and regular rhythm.      Pulses: Normal pulses.      Heart sounds: Normal heart sounds. No murmur heard.  Pulmonary:      Effort: Pulmonary effort is normal. No respiratory distress.      Breath sounds: Normal breath sounds. No wheezing or rales.   Abdominal:      General: Bowel sounds are normal. There is no distension.      Palpations: Abdomen is soft. There is no mass.      Tenderness: There is no abdominal tenderness. There is no guarding or rebound.      Hernia: No hernia is present.   Musculoskeletal:      Cervical back: Neck supple.      Lumbar back: Pain, tenderness and bony tenderness present. No swelling, deformity or spasms. Decreased range of motion.   Lymphadenopathy:      Cervical: No cervical adenopathy.   Skin:     General: Skin is warm and dry.      Coloration: Skin is not jaundiced or pale.   Neurological:      Mental Status: He is alert and oriented to person, place, and time.      Sensory: No sensory deficit.      Coordination: Coordination normal.      Deep Tendon Reflexes: Reflexes are normal and symmetric.   Psychiatric:         Mood and Affect: Mood normal.         Behavior: Behavior normal.         Assessment & Plan     Problems Addressed this Visit        Cardiac and Vasculature    Essential hypertension, benign    Relevant Medications    losartan (Cozaar) 100 MG tablet    metoprolol tartrate (LOPRESSOR) 100 MG tablet    doxazosin (Cardura) 4 MG tablet       Endocrine and Metabolic    Hypothyroidism    Relevant Medications     levothyroxine (SYNTHROID, LEVOTHROID) 150 MCG tablet    metoprolol tartrate (LOPRESSOR) 100 MG tablet    Type 2 diabetes mellitus (HCC)    Relevant Medications    metFORMIN ER (GLUCOPHAGE-XR) 500 MG 24 hr tablet       Gastrointestinal Abdominal     Esophageal reflux    Relevant Medications    omeprazole (priLOSEC) 40 MG capsule       Musculoskeletal and Injuries    Generalized osteoarthrosis, involving multiple sites   Other Visit Diagnoses     Spondylosis of lumbar region without myelopathy or radiculopathy    -  Primary      Diagnoses       Codes Comments    Spondylosis of lumbar region without myelopathy or radiculopathy    -  Primary ICD-10-CM: M47.816  ICD-9-CM: 721.3     Type 2 diabetes mellitus with hyperglycemia, without long-term current use of insulin (HCC)     ICD-10-CM: E11.65  ICD-9-CM: 250.00, 790.29     Gastroesophageal reflux disease, unspecified whether esophagitis present     ICD-10-CM: K21.9  ICD-9-CM: 530.81     Other specified hypothyroidism     ICD-10-CM: E03.8  ICD-9-CM: 244.8     Essential hypertension, benign     ICD-10-CM: I10  ICD-9-CM: 401.1     Generalized osteoarthrosis, involving multiple sites     ICD-10-CM: M15.9  ICD-9-CM: 715.09           I spent 30 minutes in patient care: Reviewing records prior to the visit, examining the patient, entering orders and documentation    Part of this note may be an electronic transcription/translation of spoken language to printed text using the Dragon Dictation System.           Transcribed from ambient dictation for ARNEL Yoder MD by Tiffanie Dugan.  11/23/22   11:09 EST  Patient or patient representative verbalized consent to the visit recording.  I have personally performed the services described in this document as transcribed by the above individual, and it is both accurate and complete.

## 2022-12-14 ENCOUNTER — OFFICE VISIT (OUTPATIENT)
Dept: FAMILY MEDICINE CLINIC | Facility: CLINIC | Age: 82
End: 2022-12-14

## 2022-12-14 VITALS
RESPIRATION RATE: 16 BRPM | OXYGEN SATURATION: 96 % | HEART RATE: 86 BPM | WEIGHT: 233 LBS | SYSTOLIC BLOOD PRESSURE: 152 MMHG | BODY MASS INDEX: 33.36 KG/M2 | HEIGHT: 70 IN | DIASTOLIC BLOOD PRESSURE: 88 MMHG | TEMPERATURE: 98 F

## 2022-12-14 DIAGNOSIS — I10 ESSENTIAL HYPERTENSION, BENIGN: Primary | ICD-10-CM

## 2022-12-14 DIAGNOSIS — M47.816 SPONDYLOSIS OF LUMBAR REGION WITHOUT MYELOPATHY OR RADICULOPATHY: ICD-10-CM

## 2022-12-14 PROCEDURE — 99213 OFFICE O/P EST LOW 20 MIN: CPT | Performed by: FAMILY MEDICINE

## 2022-12-14 RX ORDER — SPIRONOLACTONE 25 MG/1
25 TABLET ORAL EVERY MORNING
Qty: 30 TABLET | Refills: 5 | Status: ON HOLD | OUTPATIENT
Start: 2022-12-14 | End: 2023-03-18

## 2022-12-14 NOTE — PROGRESS NOTES
Subjective   Vinnie Fulton is a 82 y.o. male    Chief Complaint    Hypertension    History of Present Illness    The patient presents today for evaluation of hypertension. He had recent blood pressure medication adjustments with persisting elevated blood pressure. His blood pressure today is 152/88 mmHg. His current antihypertensive medications include doxazosin 4 mg at bedtime, losartan 100 mg in the mornings, and metoprolol 100 mg twice a day.    He reports he took his blood pressure twice yesterday and it was over 200 mmHg systolic on his machine. The patient states that he is not sure if it is his mental state and he is talking himself into this issue.     The patient explains that he did go to the orthopedist [facility] POSE[facility], and he took x-rays. He reports that he was told his x-rays did not look good. The patient adds that he is going back for magnetic resonance imaging on 12/30/2022 and he has an appointment to go back on 01/2023.    The patient reports that he is scheduled to go back to see Dr. Laura in early 01/2023.      The following portions of the patient's history were reviewed and updated as appropriate: allergies, current medications, past social history and problem list    Review of Systems   Constitutional: Negative.  Negative for fatigue and unexpected weight change.   Respiratory: Negative.  Negative for cough, chest tightness and shortness of breath.    Cardiovascular: Negative for chest pain, palpitations and leg swelling.   Gastrointestinal: Negative.  Negative for nausea.   Musculoskeletal: Positive for back pain. Negative for arthralgias, gait problem and myalgias.   Skin: Negative for color change and rash.   Neurological: Negative for dizziness, tremors, syncope, weakness, numbness and headaches.   Psychiatric/Behavioral: Negative for behavioral problems and dysphoric mood. The patient is not nervous/anxious.        Objective     Vitals:    12/14/22 1351   BP: 152/88   Pulse: 86   Resp:  16   Temp: 98 °F (36.7 °C)   SpO2: 96%       Physical Exam  Vitals and nursing note reviewed.   Constitutional:       General: He is not in acute distress.     Appearance: Normal appearance. He is well-developed. He is not ill-appearing, toxic-appearing or diaphoretic.   Neck:      Vascular: No carotid bruit or JVD.   Cardiovascular:      Rate and Rhythm: Normal rate and regular rhythm.      Pulses: Normal pulses.      Heart sounds: Normal heart sounds. No murmur heard.  Pulmonary:      Effort: Pulmonary effort is normal. No respiratory distress.      Breath sounds: Normal breath sounds.   Abdominal:      General: Bowel sounds are normal.      Palpations: Abdomen is soft.      Tenderness: There is no abdominal tenderness.   Musculoskeletal:      Lumbar back: Pain, tenderness and bony tenderness present. No swelling, deformity or spasms. Decreased range of motion.   Skin:     General: Skin is warm and dry.   Neurological:      Mental Status: He is alert and oriented to person, place, and time.      Deep Tendon Reflexes: Reflexes are normal and symmetric.         Assessment & Plan     Problems Addressed this Visit        Cardiac and Vasculature    Essential hypertension, benign - Primary    Relevant Medications    spironolactone (Aldactone) 25 MG tablet   Other Visit Diagnoses     Spondylosis of lumbar region without myelopathy or radiculopathy          Diagnoses       Codes Comments    Essential hypertension, benign    -  Primary ICD-10-CM: I10  ICD-9-CM: 401.1     Spondylosis of lumbar region without myelopathy or radiculopathy     ICD-10-CM: M47.816  ICD-9-CM: 721.3         I spent 15 minutes in patient care: Reviewing records prior to the visit, examining the patient, entering orders and documentation    Part of this note may be an electronic transcription/translation of spoken language to printed text using the Dragon Dictation System.           Transcribed from ambient dictation for ARNEL Yoder MD by  Елена Chen.  12/14/22   15:04 EST  Patient or patient representative verbalized consent to the visit recording.  I have personally performed the services described in this document as transcribed by the above individual, and it is both accurate and complete.

## 2023-01-13 ENCOUNTER — OFFICE VISIT (OUTPATIENT)
Dept: FAMILY MEDICINE CLINIC | Facility: CLINIC | Age: 83
End: 2023-01-13
Payer: MEDICARE

## 2023-01-13 VITALS
BODY MASS INDEX: 33.07 KG/M2 | SYSTOLIC BLOOD PRESSURE: 142 MMHG | DIASTOLIC BLOOD PRESSURE: 88 MMHG | HEIGHT: 70 IN | OXYGEN SATURATION: 96 % | WEIGHT: 231 LBS | RESPIRATION RATE: 16 BRPM | HEART RATE: 71 BPM | TEMPERATURE: 97.1 F

## 2023-01-13 DIAGNOSIS — M47.816 SPONDYLOSIS OF LUMBAR REGION WITHOUT MYELOPATHY OR RADICULOPATHY: ICD-10-CM

## 2023-01-13 DIAGNOSIS — I10 ESSENTIAL HYPERTENSION, BENIGN: Primary | ICD-10-CM

## 2023-01-13 PROCEDURE — 99213 OFFICE O/P EST LOW 20 MIN: CPT | Performed by: FAMILY MEDICINE

## 2023-01-13 NOTE — PROGRESS NOTES
Subjective   Vinnie Fulton is a 82 y.o. male    Chief Complaint    Hypertension    History of Present Illness  We have been having difficulties regulating the patient's antihypertensive medications. His blood pressure today improved to 142/88 mmHg.    The patient mentions that his blood pressure readings for approximately 3 to 4 days had lessened but notes that it had returned to the range of 170/100 mmHg. He admits that his blood pressure readings have been fluctuating. He says that he cannot determine what causes the fluctuations. He reports that he is taking 2 antihypertensive medications. He confirms that he is still taking spironolactone in the morning.    The patient states that he saw Dr. Lennox Laura on 01/06/2023. He claims that he had his prostate-specific antigen done on 01/06/2023 and notes that he has not heard anything from Dr. Laura yet. He verbalizes that Dr. Laura does not think he has bleeding of prostatic origin. He says that he has a follow-up appointment with Dr. Laura on 04/07/2023. He confirms that radiation therapy was discussed with him but notes that he prefers not to do it. He admits that he received Lupron injections twice and notes that he prefers not to receive them anymore.     The patient admits that he went to [facility] for his back and had a magnetic resonance and an X-ray done. He verbalizes that he was told that he would be receiving steroid injections for his back. He states that he was advised to discontinue taking Plavix and aspirin. He explains that today is his last day to take Plavix and aspirin. He mentions his appointment for back injection is on 01/19/2023. He complains that he loses his balance when getting up because of back issues. Of note, he is under the evaluation of Vinnie Bridges PA-C.     The following portions of the patient's history were reviewed and updated as appropriate: allergies, current medications, past social history and problem  list    Review of Systems   Constitutional: Negative.  Negative for fatigue and unexpected weight change.   Respiratory: Negative.  Negative for cough, chest tightness and shortness of breath.    Cardiovascular: Negative for chest pain, palpitations and leg swelling.   Gastrointestinal: Negative.  Negative for nausea.   Musculoskeletal: Positive for back pain. Negative for arthralgias, gait problem and myalgias.   Skin: Negative for color change and rash.   Neurological: Negative for dizziness, tremors, syncope, weakness, numbness and headaches.   Psychiatric/Behavioral: Negative for behavioral problems and dysphoric mood. The patient is not nervous/anxious.        Objective     Vitals:    01/13/23 1332   BP: 142/88   Pulse: 71   Resp: 16   Temp: 97.1 °F (36.2 °C)   SpO2: 96%       Physical Exam  Vitals and nursing note reviewed.   Constitutional:       General: He is not in acute distress.     Appearance: Normal appearance. He is well-developed. He is not ill-appearing, toxic-appearing or diaphoretic.   Neck:      Vascular: No carotid bruit or JVD.   Cardiovascular:      Rate and Rhythm: Normal rate and regular rhythm.      Pulses: Normal pulses.      Heart sounds: Normal heart sounds. No murmur heard.  Pulmonary:      Effort: Pulmonary effort is normal. No respiratory distress.      Breath sounds: Normal breath sounds.   Abdominal:      General: Bowel sounds are normal.      Palpations: Abdomen is soft.      Tenderness: There is no abdominal tenderness.   Musculoskeletal:      Lumbar back: Tenderness and bony tenderness present. No swelling, deformity or spasms. Decreased range of motion.   Skin:     General: Skin is warm and dry.   Neurological:      Mental Status: He is alert and oriented to person, place, and time.      Deep Tendon Reflexes: Reflexes are normal and symmetric.         Assessment & Plan     Problems Addressed this Visit        Cardiac and Vasculature    Essential hypertension, benign - Primary    Other Visit Diagnoses     Spondylosis of lumbar region without myelopathy or radiculopathy          Diagnoses       Codes Comments    Essential hypertension, benign    -  Primary ICD-10-CM: I10  ICD-9-CM: 401.1     Spondylosis of lumbar region without myelopathy or radiculopathy     ICD-10-CM: M47.816  ICD-9-CM: 721.3         I spent 15 minutes in patient care: Reviewing records prior to the visit, examining the patient, entering orders and documentation    Part of this note may be an electronic transcription/translation of spoken language to printed text using the Dragon Dictation System.           Transcribed from ambient dictation for ARNEL Yoder MD by Bahman Steele.  01/13/23   14:53 EST  Patient or patient representative verbalized consent to the visit recording.  I have personally performed the services described in this document as transcribed by the above individual, and it is both accurate and complete.

## 2023-03-17 ENCOUNTER — APPOINTMENT (OUTPATIENT)
Dept: CT IMAGING | Facility: HOSPITAL | Age: 83
End: 2023-03-17
Payer: MEDICARE

## 2023-03-17 ENCOUNTER — APPOINTMENT (OUTPATIENT)
Dept: MRI IMAGING | Facility: HOSPITAL | Age: 83
End: 2023-03-17
Payer: MEDICARE

## 2023-03-17 ENCOUNTER — HOSPITAL ENCOUNTER (OUTPATIENT)
Facility: HOSPITAL | Age: 83
Setting detail: OBSERVATION
Discharge: HOME | End: 2023-03-19
Attending: EMERGENCY MEDICINE | Admitting: HOSPITALIST
Payer: MEDICARE

## 2023-03-17 DIAGNOSIS — R42 VERTIGO: Primary | ICD-10-CM

## 2023-03-17 DIAGNOSIS — I16.0 HYPERTENSIVE URGENCY: ICD-10-CM

## 2023-03-17 DIAGNOSIS — H81.399 PERIPHERAL VERTIGO, UNSPECIFIED LATERALITY: ICD-10-CM

## 2023-03-17 PROBLEM — M19.90 OSTEOARTHRITIS: Status: ACTIVE | Noted: 2023-03-17

## 2023-03-17 LAB
APTT PPP: 32.9 SECONDS (ref 22–39)
BASOPHILS # BLD AUTO: 0.05 10*3/MM3 (ref 0–0.2)
BASOPHILS NFR BLD AUTO: 0.6 % (ref 0–1.5)
BILIRUB UR QL STRIP: NEGATIVE
CLARITY UR: CLEAR
COLOR UR: YELLOW
CREAT BLDA-MCNC: 1 MG/DL (ref 0.6–1.3)
DEPRECATED RDW RBC AUTO: 43.8 FL (ref 37–54)
EOSINOPHIL # BLD AUTO: 0.23 10*3/MM3 (ref 0–0.4)
EOSINOPHIL NFR BLD AUTO: 2.8 % (ref 0.3–6.2)
ERYTHROCYTE [DISTWIDTH] IN BLOOD BY AUTOMATED COUNT: 13.4 % (ref 12.3–15.4)
GLUCOSE UR STRIP-MCNC: NEGATIVE MG/DL
HCT VFR BLD AUTO: 42.8 % (ref 37.5–51)
HGB BLD-MCNC: 14.1 G/DL (ref 13–17.7)
HGB UR QL STRIP.AUTO: NEGATIVE
HOLD SPECIMEN: NORMAL
HOLD SPECIMEN: NORMAL
IMM GRANULOCYTES # BLD AUTO: 0.15 10*3/MM3 (ref 0–0.05)
IMM GRANULOCYTES NFR BLD AUTO: 1.8 % (ref 0–0.5)
INR PPP: 1.1 (ref 0.8–1.2)
KETONES UR QL STRIP: NEGATIVE
LEUKOCYTE ESTERASE UR QL STRIP.AUTO: NEGATIVE
LYMPHOCYTES # BLD AUTO: 1.68 10*3/MM3 (ref 0.7–3.1)
LYMPHOCYTES NFR BLD AUTO: 20.5 % (ref 19.6–45.3)
MCH RBC QN AUTO: 29.4 PG (ref 26.6–33)
MCHC RBC AUTO-ENTMCNC: 32.9 G/DL (ref 31.5–35.7)
MCV RBC AUTO: 89.4 FL (ref 79–97)
MONOCYTES # BLD AUTO: 0.6 10*3/MM3 (ref 0.1–0.9)
MONOCYTES NFR BLD AUTO: 7.3 % (ref 5–12)
NEUTROPHILS NFR BLD AUTO: 5.5 10*3/MM3 (ref 1.7–7)
NEUTROPHILS NFR BLD AUTO: 67 % (ref 42.7–76)
NITRITE UR QL STRIP: NEGATIVE
NRBC BLD AUTO-RTO: 0 /100 WBC (ref 0–0.2)
PH UR STRIP.AUTO: 7 [PH] (ref 5–8)
PLATELET # BLD AUTO: 183 10*3/MM3 (ref 140–450)
PMV BLD AUTO: 10.2 FL (ref 6–12)
PROT UR QL STRIP: NEGATIVE
PROTHROMBIN TIME: 12.7 SECONDS (ref 12.8–15.2)
RBC # BLD AUTO: 4.79 10*6/MM3 (ref 4.14–5.8)
SP GR UR STRIP: 1.03 (ref 1–1.03)
TROPONIN T SERPL HS-MCNC: 19 NG/L
UROBILINOGEN UR QL STRIP: ABNORMAL
WBC NRBC COR # BLD: 8.21 10*3/MM3 (ref 3.4–10.8)
WHOLE BLOOD HOLD COAG: NORMAL
WHOLE BLOOD HOLD SPECIMEN: NORMAL

## 2023-03-17 PROCEDURE — 25510000001 IOPAMIDOL PER 1 ML: Performed by: NURSE PRACTITIONER

## 2023-03-17 PROCEDURE — 99223 1ST HOSP IP/OBS HIGH 75: CPT | Performed by: INTERNAL MEDICINE

## 2023-03-17 PROCEDURE — 85610 PROTHROMBIN TIME: CPT

## 2023-03-17 PROCEDURE — 70450 CT HEAD/BRAIN W/O DYE: CPT

## 2023-03-17 PROCEDURE — G0378 HOSPITAL OBSERVATION PER HR: HCPCS

## 2023-03-17 PROCEDURE — 70551 MRI BRAIN STEM W/O DYE: CPT

## 2023-03-17 PROCEDURE — 70496 CT ANGIOGRAPHY HEAD: CPT

## 2023-03-17 PROCEDURE — 0042T HC CT CEREBRAL PERFUSION W/WO CONTRAST: CPT

## 2023-03-17 PROCEDURE — 99223 1ST HOSP IP/OBS HIGH 75: CPT | Performed by: NURSE PRACTITIONER

## 2023-03-17 PROCEDURE — 80047 BASIC METABLC PNL IONIZED CA: CPT

## 2023-03-17 PROCEDURE — 83036 HEMOGLOBIN GLYCOSYLATED A1C: CPT

## 2023-03-17 PROCEDURE — 93005 ELECTROCARDIOGRAM TRACING: CPT | Performed by: EMERGENCY MEDICINE

## 2023-03-17 PROCEDURE — 84484 ASSAY OF TROPONIN QUANT: CPT | Performed by: EMERGENCY MEDICINE

## 2023-03-17 PROCEDURE — 96374 THER/PROPH/DIAG INJ IV PUSH: CPT

## 2023-03-17 PROCEDURE — 85014 HEMATOCRIT: CPT

## 2023-03-17 PROCEDURE — 81003 URINALYSIS AUTO W/O SCOPE: CPT | Performed by: NURSE PRACTITIONER

## 2023-03-17 PROCEDURE — 36415 COLL VENOUS BLD VENIPUNCTURE: CPT

## 2023-03-17 PROCEDURE — 85730 THROMBOPLASTIN TIME PARTIAL: CPT | Performed by: EMERGENCY MEDICINE

## 2023-03-17 PROCEDURE — 85025 COMPLETE CBC W/AUTO DIFF WBC: CPT | Performed by: EMERGENCY MEDICINE

## 2023-03-17 PROCEDURE — 70498 CT ANGIOGRAPHY NECK: CPT

## 2023-03-17 PROCEDURE — 82565 ASSAY OF CREATININE: CPT

## 2023-03-17 PROCEDURE — 99285 EMERGENCY DEPT VISIT HI MDM: CPT

## 2023-03-17 RX ORDER — CLOPIDOGREL BISULFATE 75 MG/1
75 TABLET ORAL DAILY
Status: CANCELLED | OUTPATIENT
Start: 2023-03-18

## 2023-03-17 RX ORDER — SODIUM CHLORIDE 0.9 % (FLUSH) 0.9 %
10 SYRINGE (ML) INJECTION AS NEEDED
Status: DISCONTINUED | OUTPATIENT
Start: 2023-03-17 | End: 2023-03-19 | Stop reason: HOSPADM

## 2023-03-17 RX ORDER — DIAZEPAM 5 MG/ML
5 INJECTION, SOLUTION INTRAMUSCULAR; INTRAVENOUS ONCE
Status: COMPLETED | OUTPATIENT
Start: 2023-03-17 | End: 2023-03-18

## 2023-03-17 RX ORDER — MECLIZINE HYDROCHLORIDE 25 MG/1
25 TABLET ORAL ONCE
Status: COMPLETED | OUTPATIENT
Start: 2023-03-17 | End: 2023-03-18

## 2023-03-17 RX ORDER — LABETALOL HYDROCHLORIDE 5 MG/ML
10 INJECTION, SOLUTION INTRAVENOUS ONCE
Status: COMPLETED | OUTPATIENT
Start: 2023-03-17 | End: 2023-03-17

## 2023-03-17 RX ADMIN — LABETALOL HYDROCHLORIDE 10 MG: 5 INJECTION, SOLUTION INTRAVENOUS at 22:51

## 2023-03-17 RX ADMIN — IOPAMIDOL 115 ML: 755 INJECTION, SOLUTION INTRAVENOUS at 21:39

## 2023-03-17 NOTE — Clinical Note
Level of Care: Telemetry [5]   Diagnosis: Vertigo [[id number]]   Admitting Physician: COTY ECHEVARRIA [[id number]]   Attending Physician: COTY ECHEVARRIA [[id number]]   Bed Request Comments: tele

## 2023-03-18 ENCOUNTER — APPOINTMENT (OUTPATIENT)
Dept: MRI IMAGING | Facility: HOSPITAL | Age: 83
End: 2023-03-18
Payer: MEDICARE

## 2023-03-18 LAB
ALBUMIN SERPL-MCNC: 4 G/DL (ref 3.5–5.2)
ALBUMIN/GLOB SERPL: 1.5 G/DL
ALP SERPL-CCNC: 73 U/L (ref 39–117)
ALT SERPL W P-5'-P-CCNC: 8 U/L (ref 1–41)
ANION GAP SERPL CALCULATED.3IONS-SCNC: 9 MMOL/L (ref 5–15)
ANION GAP SERPL CALCULATED.3IONS-SCNC: 9 MMOL/L (ref 5–15)
AST SERPL-CCNC: 12 U/L (ref 1–40)
BASOPHILS # BLD AUTO: 0.03 10*3/MM3 (ref 0–0.2)
BASOPHILS NFR BLD AUTO: 0.4 % (ref 0–1.5)
BILIRUB SERPL-MCNC: 0.3 MG/DL (ref 0–1.2)
BUN SERPL-MCNC: 15 MG/DL (ref 8–23)
BUN SERPL-MCNC: 16 MG/DL (ref 8–23)
BUN/CREAT SERPL: 17 (ref 7–25)
BUN/CREAT SERPL: 17.4 (ref 7–25)
CALCIUM SPEC-SCNC: 8.2 MG/DL (ref 8.6–10.5)
CALCIUM SPEC-SCNC: 8.5 MG/DL (ref 8.6–10.5)
CHLORIDE SERPL-SCNC: 101 MMOL/L (ref 98–107)
CHLORIDE SERPL-SCNC: 98 MMOL/L (ref 98–107)
CHOLEST SERPL-MCNC: 202 MG/DL (ref 0–200)
CO2 SERPL-SCNC: 30 MMOL/L (ref 22–29)
CO2 SERPL-SCNC: 31 MMOL/L (ref 22–29)
CREAT SERPL-MCNC: 0.86 MG/DL (ref 0.76–1.27)
CREAT SERPL-MCNC: 0.94 MG/DL (ref 0.76–1.27)
DEPRECATED RDW RBC AUTO: 42.9 FL (ref 37–54)
EGFRCR SERPLBLD CKD-EPI 2021: 80.9 ML/MIN/1.73
EGFRCR SERPLBLD CKD-EPI 2021: 86.5 ML/MIN/1.73
EOSINOPHIL # BLD AUTO: 0.22 10*3/MM3 (ref 0–0.4)
EOSINOPHIL NFR BLD AUTO: 2.7 % (ref 0.3–6.2)
ERYTHROCYTE [DISTWIDTH] IN BLOOD BY AUTOMATED COUNT: 13.5 % (ref 12.3–15.4)
GEN 5 2HR TROPONIN T REFLEX: 18 NG/L
GLOBULIN UR ELPH-MCNC: 2.7 GM/DL
GLUCOSE BLDC GLUCOMTR-MCNC: 145 MG/DL (ref 70–130)
GLUCOSE BLDC GLUCOMTR-MCNC: 178 MG/DL (ref 70–130)
GLUCOSE BLDC GLUCOMTR-MCNC: 181 MG/DL (ref 70–130)
GLUCOSE BLDC GLUCOMTR-MCNC: 182 MG/DL (ref 70–130)
GLUCOSE BLDC GLUCOMTR-MCNC: 192 MG/DL (ref 70–130)
GLUCOSE BLDC GLUCOMTR-MCNC: 223 MG/DL (ref 70–130)
GLUCOSE SERPL-MCNC: 160 MG/DL (ref 65–99)
GLUCOSE SERPL-MCNC: 188 MG/DL (ref 65–99)
HBA1C MFR BLD: 7.4 % (ref 4.8–5.6)
HCT VFR BLD AUTO: 37.2 % (ref 37.5–51)
HDLC SERPL-MCNC: 36 MG/DL (ref 40–60)
HGB BLD-MCNC: 12.6 G/DL (ref 13–17.7)
HOLD SPECIMEN: NORMAL
IMM GRANULOCYTES # BLD AUTO: 0.05 10*3/MM3 (ref 0–0.05)
IMM GRANULOCYTES NFR BLD AUTO: 0.6 % (ref 0–0.5)
LDLC SERPL CALC-MCNC: 149 MG/DL (ref 0–100)
LDLC/HDLC SERPL: 4.08 {RATIO}
LYMPHOCYTES # BLD AUTO: 1.78 10*3/MM3 (ref 0.7–3.1)
LYMPHOCYTES NFR BLD AUTO: 22.2 % (ref 19.6–45.3)
MAGNESIUM SERPL-MCNC: 1.4 MG/DL (ref 1.6–2.4)
MCH RBC QN AUTO: 29.6 PG (ref 26.6–33)
MCHC RBC AUTO-ENTMCNC: 33.9 G/DL (ref 31.5–35.7)
MCV RBC AUTO: 87.5 FL (ref 79–97)
MONOCYTES # BLD AUTO: 0.58 10*3/MM3 (ref 0.1–0.9)
MONOCYTES NFR BLD AUTO: 7.2 % (ref 5–12)
NEUTROPHILS NFR BLD AUTO: 5.36 10*3/MM3 (ref 1.7–7)
NEUTROPHILS NFR BLD AUTO: 66.9 % (ref 42.7–76)
NRBC BLD AUTO-RTO: 0 /100 WBC (ref 0–0.2)
PA ADP PRP-ACNC: 147 PRU
PLATELET # BLD AUTO: 156 10*3/MM3 (ref 140–450)
PMV BLD AUTO: 9.6 FL (ref 6–12)
POTASSIUM SERPL-SCNC: 4.1 MMOL/L (ref 3.5–5.2)
POTASSIUM SERPL-SCNC: 4.7 MMOL/L (ref 3.5–5.2)
PROT SERPL-MCNC: 6.7 G/DL (ref 6–8.5)
RBC # BLD AUTO: 4.25 10*6/MM3 (ref 4.14–5.8)
SODIUM SERPL-SCNC: 138 MMOL/L (ref 136–145)
SODIUM SERPL-SCNC: 140 MMOL/L (ref 136–145)
TRIGL SERPL-MCNC: 95 MG/DL (ref 0–150)
TROPONIN T DELTA: 1 NG/L
TROPONIN T SERPL HS-MCNC: 17 NG/L
VLDLC SERPL-MCNC: 17 MG/DL (ref 5–40)
WBC NRBC COR # BLD: 8.02 10*3/MM3 (ref 3.4–10.8)

## 2023-03-18 PROCEDURE — 85576 BLOOD PLATELET AGGREGATION: CPT | Performed by: NURSE PRACTITIONER

## 2023-03-18 PROCEDURE — 82962 GLUCOSE BLOOD TEST: CPT

## 2023-03-18 PROCEDURE — 99232 SBSQ HOSP IP/OBS MODERATE 35: CPT | Performed by: HOSPITALIST

## 2023-03-18 PROCEDURE — 97116 GAIT TRAINING THERAPY: CPT

## 2023-03-18 PROCEDURE — G0378 HOSPITAL OBSERVATION PER HR: HCPCS

## 2023-03-18 PROCEDURE — 80061 LIPID PANEL: CPT | Performed by: NURSE PRACTITIONER

## 2023-03-18 PROCEDURE — 83735 ASSAY OF MAGNESIUM: CPT

## 2023-03-18 PROCEDURE — 63710000001 INSULIN LISPRO (HUMAN) PER 5 UNITS

## 2023-03-18 PROCEDURE — 97162 PT EVAL MOD COMPLEX 30 MIN: CPT

## 2023-03-18 PROCEDURE — 96375 TX/PRO/DX INJ NEW DRUG ADDON: CPT

## 2023-03-18 PROCEDURE — 85025 COMPLETE CBC W/AUTO DIFF WBC: CPT

## 2023-03-18 PROCEDURE — 99233 SBSQ HOSP IP/OBS HIGH 50: CPT | Performed by: STUDENT IN AN ORGANIZED HEALTH CARE EDUCATION/TRAINING PROGRAM

## 2023-03-18 PROCEDURE — 84484 ASSAY OF TROPONIN QUANT: CPT

## 2023-03-18 PROCEDURE — 80053 COMPREHEN METABOLIC PANEL: CPT | Performed by: INTERNAL MEDICINE

## 2023-03-18 PROCEDURE — 99204 OFFICE O/P NEW MOD 45 MIN: CPT | Performed by: INTERNAL MEDICINE

## 2023-03-18 PROCEDURE — 25010000002 DIAZEPAM PER 5 MG: Performed by: EMERGENCY MEDICINE

## 2023-03-18 PROCEDURE — 70551 MRI BRAIN STEM W/O DYE: CPT

## 2023-03-18 RX ORDER — IBUPROFEN 600 MG/1
1 TABLET ORAL
Status: DISCONTINUED | OUTPATIENT
Start: 2023-03-18 | End: 2023-03-19 | Stop reason: HOSPADM

## 2023-03-18 RX ORDER — ATORVASTATIN CALCIUM 40 MG/1
80 TABLET, FILM COATED ORAL NIGHTLY
Status: DISCONTINUED | OUTPATIENT
Start: 2023-03-18 | End: 2023-03-18

## 2023-03-18 RX ORDER — ONDANSETRON 4 MG/1
4 TABLET, FILM COATED ORAL EVERY 6 HOURS PRN
Status: DISCONTINUED | OUTPATIENT
Start: 2023-03-18 | End: 2023-03-19 | Stop reason: HOSPADM

## 2023-03-18 RX ORDER — ACETAMINOPHEN 650 MG/1
650 SUPPOSITORY RECTAL EVERY 4 HOURS PRN
Status: DISCONTINUED | OUTPATIENT
Start: 2023-03-18 | End: 2023-03-19 | Stop reason: HOSPADM

## 2023-03-18 RX ORDER — SPIRONOLACTONE 25 MG/1
25 TABLET ORAL DAILY
COMMUNITY
End: 2023-04-04

## 2023-03-18 RX ORDER — LEVOTHYROXINE SODIUM 0.15 MG/1
150 TABLET ORAL
Status: DISCONTINUED | OUTPATIENT
Start: 2023-03-18 | End: 2023-03-19 | Stop reason: HOSPADM

## 2023-03-18 RX ORDER — PANTOPRAZOLE SODIUM 40 MG/1
40 TABLET, DELAYED RELEASE ORAL DAILY
Status: DISCONTINUED | OUTPATIENT
Start: 2023-03-18 | End: 2023-03-19 | Stop reason: HOSPADM

## 2023-03-18 RX ORDER — SODIUM CHLORIDE 9 MG/ML
40 INJECTION, SOLUTION INTRAVENOUS AS NEEDED
Status: DISCONTINUED | OUTPATIENT
Start: 2023-03-18 | End: 2023-03-19 | Stop reason: HOSPADM

## 2023-03-18 RX ORDER — INSULIN LISPRO 100 [IU]/ML
0-7 INJECTION, SOLUTION INTRAVENOUS; SUBCUTANEOUS
Status: DISCONTINUED | OUTPATIENT
Start: 2023-03-18 | End: 2023-03-19 | Stop reason: HOSPADM

## 2023-03-18 RX ORDER — ONDANSETRON 2 MG/ML
4 INJECTION INTRAMUSCULAR; INTRAVENOUS EVERY 6 HOURS PRN
Status: DISCONTINUED | OUTPATIENT
Start: 2023-03-18 | End: 2023-03-19 | Stop reason: HOSPADM

## 2023-03-18 RX ORDER — SODIUM CHLORIDE 0.9 % (FLUSH) 0.9 %
10 SYRINGE (ML) INJECTION EVERY 12 HOURS SCHEDULED
Status: DISCONTINUED | OUTPATIENT
Start: 2023-03-18 | End: 2023-03-19 | Stop reason: HOSPADM

## 2023-03-18 RX ORDER — ACETAMINOPHEN 325 MG/1
650 TABLET ORAL EVERY 4 HOURS PRN
Status: DISCONTINUED | OUTPATIENT
Start: 2023-03-18 | End: 2023-03-19 | Stop reason: HOSPADM

## 2023-03-18 RX ORDER — CLOPIDOGREL BISULFATE 75 MG/1
75 TABLET ORAL DAILY
Status: DISCONTINUED | OUTPATIENT
Start: 2023-03-18 | End: 2023-03-19 | Stop reason: HOSPADM

## 2023-03-18 RX ORDER — SPIRONOLACTONE 25 MG/1
25 TABLET ORAL DAILY
Status: DISCONTINUED | OUTPATIENT
Start: 2023-03-18 | End: 2023-03-19 | Stop reason: HOSPADM

## 2023-03-18 RX ORDER — NICOTINE POLACRILEX 4 MG
15 LOZENGE BUCCAL
Status: DISCONTINUED | OUTPATIENT
Start: 2023-03-18 | End: 2023-03-19 | Stop reason: HOSPADM

## 2023-03-18 RX ORDER — AMLODIPINE BESYLATE 5 MG/1
5 TABLET ORAL
Status: DISCONTINUED | OUTPATIENT
Start: 2023-03-18 | End: 2023-03-19 | Stop reason: HOSPADM

## 2023-03-18 RX ORDER — ACETAMINOPHEN 160 MG/5ML
650 SOLUTION ORAL EVERY 4 HOURS PRN
Status: DISCONTINUED | OUTPATIENT
Start: 2023-03-18 | End: 2023-03-19 | Stop reason: HOSPADM

## 2023-03-18 RX ORDER — DEXTROSE MONOHYDRATE 25 G/50ML
25 INJECTION, SOLUTION INTRAVENOUS
Status: DISCONTINUED | OUTPATIENT
Start: 2023-03-18 | End: 2023-03-19 | Stop reason: HOSPADM

## 2023-03-18 RX ORDER — ASPIRIN 81 MG/1
81 TABLET ORAL DAILY
Status: CANCELLED | OUTPATIENT
Start: 2023-03-18

## 2023-03-18 RX ORDER — ASPIRIN 81 MG/1
324 TABLET, CHEWABLE ORAL DAILY
Status: DISCONTINUED | OUTPATIENT
Start: 2023-03-18 | End: 2023-03-19 | Stop reason: HOSPADM

## 2023-03-18 RX ORDER — TERAZOSIN 5 MG/1
5 CAPSULE ORAL NIGHTLY
Status: DISCONTINUED | OUTPATIENT
Start: 2023-03-18 | End: 2023-03-19 | Stop reason: HOSPADM

## 2023-03-18 RX ORDER — ROSUVASTATIN CALCIUM 20 MG/1
20 TABLET, COATED ORAL NIGHTLY
Status: DISCONTINUED | OUTPATIENT
Start: 2023-03-18 | End: 2023-03-19 | Stop reason: HOSPADM

## 2023-03-18 RX ORDER — MECLIZINE HYDROCHLORIDE 25 MG/1
25 TABLET ORAL 3 TIMES DAILY PRN
Status: DISCONTINUED | OUTPATIENT
Start: 2023-03-18 | End: 2023-03-19 | Stop reason: HOSPADM

## 2023-03-18 RX ORDER — LOSARTAN POTASSIUM 50 MG/1
100 TABLET ORAL DAILY
Status: DISCONTINUED | OUTPATIENT
Start: 2023-03-18 | End: 2023-03-19 | Stop reason: HOSPADM

## 2023-03-18 RX ORDER — SODIUM CHLORIDE 0.9 % (FLUSH) 0.9 %
10 SYRINGE (ML) INJECTION AS NEEDED
Status: DISCONTINUED | OUTPATIENT
Start: 2023-03-18 | End: 2023-03-19 | Stop reason: HOSPADM

## 2023-03-18 RX ORDER — ASPIRIN 300 MG/1
300 SUPPOSITORY RECTAL DAILY
Status: DISCONTINUED | OUTPATIENT
Start: 2023-03-18 | End: 2023-03-19 | Stop reason: HOSPADM

## 2023-03-18 RX ORDER — METOPROLOL TARTRATE 100 MG/1
100 TABLET ORAL 2 TIMES DAILY
Status: DISCONTINUED | OUTPATIENT
Start: 2023-03-18 | End: 2023-03-19 | Stop reason: HOSPADM

## 2023-03-18 RX ORDER — HYDROXYZINE HYDROCHLORIDE 25 MG/1
25 TABLET, FILM COATED ORAL 3 TIMES DAILY PRN
Status: DISCONTINUED | OUTPATIENT
Start: 2023-03-18 | End: 2023-03-19 | Stop reason: HOSPADM

## 2023-03-18 RX ORDER — METOPROLOL SUCCINATE 100 MG/1
100 TABLET, EXTENDED RELEASE ORAL DAILY
COMMUNITY
End: 2023-03-19 | Stop reason: HOSPADM

## 2023-03-18 RX ADMIN — METOPROLOL TARTRATE 100 MG: 100 TABLET, FILM COATED ORAL at 21:33

## 2023-03-18 RX ADMIN — INSULIN LISPRO 2 UNITS: 100 INJECTION, SOLUTION INTRAVENOUS; SUBCUTANEOUS at 17:19

## 2023-03-18 RX ADMIN — METOPROLOL TARTRATE 100 MG: 100 TABLET, FILM COATED ORAL at 08:33

## 2023-03-18 RX ADMIN — PANTOPRAZOLE SODIUM 40 MG: 40 TABLET, DELAYED RELEASE ORAL at 08:33

## 2023-03-18 RX ADMIN — MECLIZINE HYDROCHLORIDE 25 MG: 25 TABLET ORAL at 00:06

## 2023-03-18 RX ADMIN — AMLODIPINE BESYLATE 5 MG: 5 TABLET ORAL at 13:37

## 2023-03-18 RX ADMIN — LOSARTAN POTASSIUM 100 MG: 50 TABLET, FILM COATED ORAL at 08:32

## 2023-03-18 RX ADMIN — TERAZOSIN HYDROCHLORIDE 5 MG: 5 CAPSULE ORAL at 21:36

## 2023-03-18 RX ADMIN — LEVOTHYROXINE SODIUM 150 MCG: 150 TABLET ORAL at 05:53

## 2023-03-18 RX ADMIN — ROSUVASTATIN CALCIUM 20 MG: 20 TABLET, COATED ORAL at 21:31

## 2023-03-18 RX ADMIN — CLOPIDOGREL BISULFATE 75 MG: 75 TABLET ORAL at 08:33

## 2023-03-18 RX ADMIN — Medication 10 ML: at 21:33

## 2023-03-18 RX ADMIN — SPIRONOLACTONE 25 MG: 25 TABLET ORAL at 08:33

## 2023-03-18 RX ADMIN — Medication 10 ML: at 21:32

## 2023-03-18 RX ADMIN — DIAZEPAM 5 MG: 5 INJECTION, SOLUTION INTRAMUSCULAR; INTRAVENOUS at 00:04

## 2023-03-18 RX ADMIN — ASPIRIN 81 MG 324 MG: 81 TABLET ORAL at 08:32

## 2023-03-18 RX ADMIN — MAGNESIUM OXIDE TAB 400 MG (241.3 MG ELEMENTAL MG) 400 MG: 400 (241.3 MG) TAB at 15:09

## 2023-03-18 RX ADMIN — Medication 10 ML: at 02:48

## 2023-03-18 RX ADMIN — TERAZOSIN HYDROCHLORIDE 5 MG: 5 CAPSULE ORAL at 02:51

## 2023-03-18 NOTE — PLAN OF CARE
Goal Outcome Evaluation:                     Patient resting well, vital signs stable, NIH 0, taken to MRI, vision is much better, will continue to monitor

## 2023-03-18 NOTE — CONSULTS
[facility] at [facility]  CARDIOLOGY CONSULTATION NOTE    Vinnie Fulton  : 1940  MRN:[id number]  Home Phone:[phone]    Date of Admission:3/17/2023  Date of Consultation: 23    PCP: Ivan Yoder MD    IDENTIFICATION: A 82 y.o. 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)    Osteoarthritis    ALLERGIES:   Allergies   Allergen Reactions   • Lisinopril-Hydrochlorothiazide GI Intolerance   • Lortab [Hydrocodone-Acetaminophen]    • Tetanus Toxoids        HOME MEDICINES:   Current Outpatient Medications   Medication Instructions   • ascorbic acid (VITAMIN C) 1000 MG tablet 1 tablet, Oral, Daily   • aspirin 81 mg, Oral, Daily   • clopidogrel (PLAVIX) 75 mg, Oral, Daily   • doxazosin (CARDURA) 4 mg, Oral, Nightly   • levothyroxine (SYNTHROID, LEVOTHROID) 150 mcg, Oral, Daily   • losartan (COZAAR) 100 mg, Oral, Daily   • meclizine (ANTIVERT) 25 mg, Oral, 3 Times Daily PRN   • metFORMIN ER (GLUCOPHAGE-XR) 1,000 mg, Oral, Daily With Breakfast   • metoprolol succinate XL (TOPROL-XL) 100 mg, Oral, Daily   • metoprolol tartrate (LOPRESSOR) 100 mg, Oral, 2 Times Daily   • naproxen sodium (ALEVE) 220 MG tablet Every 12 Hours   • omeprazole (PRILOSEC) 40 mg, Oral, Daily   • spironolactone (ALDACTONE) 25 mg, Oral, Daily     HPI: Mr. Fulton is an 83 y/o male with HTN, HLD, GERD, hypothyroidism, history of lacunar infarct in , prostate cancer, and osteoarthritis who is seen in consultation for uncontrolled hypertension. He is followed by Dr. Diaz for hypertension and just saw him last week, BP was 140s at that time and no changes were made. He presented to the  "hospital last night with dizziness, positional vertigo and visual changes. On arrival his SBP was 220s mmHg. He was given IV Labetalol for hypertensive emergency. CTA head and neck showed occlusion of the right vertebral V4 segment, but hyperacute MRI was negative for infarct. Cardiology has been asked to see for uncontrolled hypertension.  He is compliant with his home medicines, but does state his BP has been difficult to control and he will have readings up to 200mmHg at home. He denies cardiac symptoms, no chest pain, dyspnea or palpitations. He does still have vertigo sensation with turning in bed, however slightly better than yesterday. Wife also reports patient was having horizontal nystagmus with his symptoms yesterday.       ROS: All systems have been reviewed and are negative with the exception of those mentioned in the HPI and problem list above.    Surgical History:   Past Surgical History:   Procedure Laterality Date   • CARPAL TUNNEL RELEASE         Social History:   Social History     Socioeconomic History   • Marital status:    Tobacco Use   • Smoking status: Former     Packs/day: 1.00     Years: 1.00     Pack years: 1.00     Types: Cigarettes     Quit date: 2000     Years since quittin.2   • Smokeless tobacco: Former   Vaping Use   • Vaping Use: Never used   Substance and Sexual Activity   • Alcohol use: No   • Drug use: No   • Sexual activity: Not Currently       Family History:   Family History   Problem Relation Age of Onset   • Heart disease Mother    • Diabetes Mother    • Heart disease Father    • Diabetes Father    • Cancer Father        Objective     /93 (BP Location: Left arm, Patient Position: Lying)   Pulse 56   Temp 98.1 °F (36.7 °C) (Oral)   Resp 20   Ht 177.8 cm (70\")   Wt 105 kg (231 lb)   SpO2 96%   BMI 33.15 kg/m²   No intake or output data in the 24 hours ending 23 0755    PHYSICAL EXAM:  CONSTITUTIONAL: Well nourished, cooperative, in no acute " distress  HEENT: Normocephalic, atraumatic, PERRLA, no JVD  CARDIOVASCULAR:  Regular rhythm and normal rate, no murmur, gallop, rub.   RESPIRATORY: Clear to auscultation, normal respiratory effort, no wheezing, rales or rhonchi  GI: Soft, nontender, normal bowel sounds  EXTREMITIES: No gross deformities, no edema.   SKIN: Warm, dry. No bleeding, bruising or rash  NEUROLOGICAL: No focal deficits  PSYCHIATRIC: Normal mood and affect. Behavior is normal     Labs/Diagnostic Data  Results from last 7 days   Lab Units 03/18/23  0330 03/18/23 0053 03/17/23 2139   SODIUM mmol/L 140 138  --    POTASSIUM mmol/L 4.1 4.7  --    CHLORIDE mmol/L 101 98  --    CO2 mmol/L 30.0* 31.0*  --    BUN mg/dL 15 16  --    CREATININE mg/dL 0.86 0.94 1.00   GLUCOSE mg/dL 160* 188*  --    CALCIUM mg/dL 8.2* 8.5*  --      Results from last 7 days   Lab Units 03/18/23  0330 03/18/23 0053 03/17/23 2155   HSTROP T ng/L 18* 17* 19*     Results from last 7 days   Lab Units 03/18/23 0330 03/17/23 2155   WBC 10*3/mm3 8.02 8.21   HEMOGLOBIN g/dL 12.6* 14.1   HEMATOCRIT % 37.2* 42.8   PLATELETS 10*3/mm3 156 183     Results from last 7 days   Lab Units 03/18/23  0330   MAGNESIUM mg/dL 1.4*     Results from last 7 days   Lab Units 03/18/23  0330   CHOLESTEROL mg/dL 202*   TRIGLYCERIDES mg/dL 95   HDL CHOL mg/dL 36*   LDL CHOL mg/dL 149*         Results from last 7 days   Lab Units 03/17/23 2155   HEMOGLOBIN A1C % 7.40*         Results from last 7 days   Lab Units 03/17/23 2155 03/17/23 2149   PROTIME seconds  --  12.7*   INR   --  1.1   APTT seconds 32.9  --        I personally reviewed the patient's EKG/Telemetry data    Radiology Data:   MRI Brain 3/17/23:  IMPRESSION:  Impression:   1.No restricted diffusion.  2.Small vessel ischemic change suggested.    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 There is some hard plaque in the area of the V4 segment of the left vertebral  artery.  2.No significant carotid stenosis  3.No large vessel intracranial occlusion  4.No core infarct or tissue at risk based on rapid parameters    Current Medications:    aspirin, 324 mg, Oral, Daily   Or  aspirin, 300 mg, Rectal, Daily  atorvastatin, 80 mg, Oral, Nightly  clopidogrel, 75 mg, Oral, Daily  insulin lispro, 0-7 Units, Subcutaneous, 4x Daily With Meals & Nightly  levothyroxine, 150 mcg, Oral, Q AM  losartan, 100 mg, Oral, Daily  metoprolol tartrate, 100 mg, Oral, BID  pantoprazole, 40 mg, Oral, Daily  sodium chloride, 10 mL, Intravenous, Q12H  sodium chloride, 10 mL, Intravenous, Q12H  spironolactone, 25 mg, Oral, Daily  terazosin, 5 mg, Oral, Nightly      niCARdipine, 5-15 mg/hr        Assessment and Plan:     1. Hypertensive emergency  /120s mmHg on arrival to the hospital   Improved after IV Labetalol 10mg  On Losartan 100mg, Metoprolol 100mg BID, Doxazosin 5mg, and Spironolactone 25mg at home  Add Amlodipine 5mg daily   Goal blood pressure less than 130/80 given history of strokes.    2. Dizziness/vertigo sensation   - also reports horizontal nystagmus yesterday   - Stroke neurology following  - occlusion of V4 segment of right vertebral artery on CTA, however hyperacute MRI negative for acute infarct  - repeat MRI tomorrow per Neuro recommendations    3. Hyperlipidemia  - , not at target, goal LDL less than 70  - started on Atorvastatin 80mg, but he states he had severe myalgia and muscle weakness with this drug in the past.  We will switch to rosuvastatin 20 mg daily.  If he is intolerant to that, he is agreeable to try Repatha as an outpatient.      Scribed for Ivan Robison MD by Katina Reilly PA-C. 3/18/2023  07:56 EDT    I,Ivan Robison M.D., personally performed the services described in this documentation as scribed by the above named individual in my presence, and it is both accurate and complete.    Ivan Robison MD, [facility]  Cardiology  03/18/23  15:41 EDT

## 2023-03-18 NOTE — SIGNIFICANT NOTE
03/18/23 1551   SLP Deferred Reason   SLP Deferred Reason Routine  (SLP consult received. Pt w/ other staff at time of attempt to evaluate. Will defer and f/u tomorrow.)

## 2023-03-18 NOTE — PROGRESS NOTES
Stroke Progress Note       Chief Complaint:  Confusion and vision changes     Subjective    Subjective     No acute events overnight   Symptoms improved         Objective      Temp:  [97.7 °F (36.5 °C)-98.6 °F (37 °C)] 98.3 °F (36.8 °C)  Heart Rate:  [56-90] 59  Resp:  [16-20] 20  BP: (116-225)/() 116/68    NEURO    MENTAL STATUS: AAOx3, memory intact, fund of knowledge appropriate    LANG/SPEECH: Naming and repetition intact, fluent, follows 3-step commands    CRANIAL NERVES:    Pupils equal and reactive, EOMI intact, no gaze deviation, no nystagmus  No facial droop, cough and gag intact, shoulder shrug intact, tongue midline   Left beating nystagmus- horizontal     MOTOR:  Moves all extremities equally    SENSORY: Normal to light touch all throughout     COORDINATION: Normal finger to nose and heel to shin, no tremor, no dysmetria    STATION: Not assessed due to patient condition    GAIT: Not assessed due to patient condition  Results Review:    I reviewed the patient's new clinical results.    Lab Results (last 24 hours)     Procedure Component Value Units Date/Time    POC Glucose Once [[id number]]  (Abnormal) Collected: 03/18/23 1207    Specimen: Blood Updated: 03/18/23 1208     Glucose 192 mg/dL      Comment: Meter: [id number] : [id number] Franko MELENDEZ       POC Glucose Once [[id number]]  (Abnormal) Collected: 03/18/23 0600    Specimen: Blood Updated: 03/18/23 0603     Glucose 145 mg/dL      Comment: Meter: [id number] : [id number] Magda Moreno       Basic Metabolic Panel [[id number]]  (Abnormal) Collected: 03/18/23 0330    Specimen: Blood Updated: 03/18/23 0358     Glucose 160 mg/dL      BUN 15 mg/dL      Creatinine 0.86 mg/dL      Sodium 140 mmol/L      Potassium 4.1 mmol/L      Chloride 101 mmol/L      CO2 30.0 mmol/L      Calcium 8.2 mg/dL      BUN/Creatinine Ratio 17.4     Anion Gap 9.0 mmol/L      eGFR 86.5 mL/min/1.73     Narrative:      GFR Normal >60  Chronic Kidney Disease <60  Kidney  Failure <15    The GFR formula is only valid for adults with stable renal function between ages 18 and 70.    Lipid Panel [[id number]]  (Abnormal) Collected: 03/18/23 0330    Specimen: Blood Updated: 03/18/23 0358     Total Cholesterol 202 mg/dL      Triglycerides 95 mg/dL      HDL Cholesterol 36 mg/dL      LDL Cholesterol  149 mg/dL      VLDL Cholesterol 17 mg/dL      LDL/HDL Ratio 4.08    Narrative:      Cholesterol Reference Ranges  (U.S. Department of Health and Human Services ATP III Classifications)    Desirable          <200 mg/dL  Borderline High    200-239 mg/dL  High Risk          >240 mg/dL      Triglyceride Reference Ranges  (U.S. Department of Health and Human Services ATP III Classifications)    Normal           <150 mg/dL  Borderline High  150-199 mg/dL  High             200-499 mg/dL  Very High        >500 mg/dL    HDL Reference Ranges  (U.S. Department of Health and Human Services ATP III Classifications)    Low     <40 mg/dl (major risk factor for CHD)  High    >60 mg/dl ('negative' risk factor for CHD)        LDL Reference Ranges  (U.S. Department of Health and Human Services ATP III Classifications)    Optimal          <100 mg/dL  Near Optimal     100-129 mg/dL  Borderline High  130-159 mg/dL  High             160-189 mg/dL  Very High        >189 mg/dL    Magnesium [[id number]]  (Abnormal) Collected: 03/18/23 0330    Specimen: Blood Updated: 03/18/23 0358     Magnesium 1.4 mg/dL     High Sensitivity Troponin T 2Hr [[id number]]  (Abnormal) Collected: 03/18/23 0330    Specimen: Blood Updated: 03/18/23 0358     HS Troponin T 18 ng/L      Troponin T Delta 1 ng/L     Narrative:      High Sensitive Troponin T Reference Range:  <10.0 ng/L- Negative Female for AMI  <15.0 ng/L- Negative Male for AMI  >=10 - Abnormal Female indicating possible myocardial injury.  >=15 - Abnormal Male indicating possible myocardial injury.   Clinicians would have to utilize clinical acumen, EKG, Troponin, and serial changes to  determine if it is an Acute Myocardial Infarction or myocardial injury due to an underlying chronic condition.         P2Y12 Platelet Inhibition [[id number]] Collected: 03/18/23 0330    Specimen: Blood Updated: 03/18/23 0352     P2Y12 Reactivity Unit 147 PRU     Narrative:      P2Y12 Interpretation:  Pre-Drug normal reference range is 194-418 PRU.  Test results are reported in P2Y12 reaction units (PRU). This measures the extent of platelet aggregation in the presence of P2Y12 inhibitor drugs, such as clopidogrel (Plavix), prasugrel (Effient), ticagrelor (Brilinta), ticlopidine (Ticlid).  P2Y12 values <194 PRU (low end of reference range) are specific evidence of a P2Y12 inhibitor effect.  Patients who have been treated with Glycoprotein IIb/IIIa inhibitors should not be tested until platelet function has recovered. This time period is approximately 14 days after discontinuation of abciximab (ReoPro) and up to 48 hours after discontinuation of eptifibatide (Integrilin) and tirofiban (Aggrastat).   The P2Y12 test results should be interpreted in conjunction with other clinical and lab data available to the clinician.    CBC Auto Differential [[id number]]  (Abnormal) Collected: 03/18/23 0330    Specimen: Blood Updated: 03/18/23 0345     WBC 8.02 10*3/mm3      RBC 4.25 10*6/mm3      Hemoglobin 12.6 g/dL      Hematocrit 37.2 %      MCV 87.5 fL      MCH 29.6 pg      MCHC 33.9 g/dL      RDW 13.5 %      RDW-SD 42.9 fl      MPV 9.6 fL      Platelets 156 10*3/mm3      Neutrophil % 66.9 %      Lymphocyte % 22.2 %      Monocyte % 7.2 %      Eosinophil % 2.7 %      Basophil % 0.4 %      Immature Grans % 0.6 %      Neutrophils, Absolute 5.36 10*3/mm3      Lymphocytes, Absolute 1.78 10*3/mm3      Monocytes, Absolute 0.58 10*3/mm3      Eosinophils, Absolute 0.22 10*3/mm3      Basophils, Absolute 0.03 10*3/mm3      Immature Grans, Absolute 0.05 10*3/mm3      nRBC 0.0 /100 WBC     Comprehensive Metabolic Panel [[id number]]  (Abnormal)  Collected: 03/18/23 0053    Specimen: Blood Updated: 03/18/23 0209     Glucose 188 mg/dL      BUN 16 mg/dL      Creatinine 0.94 mg/dL      Sodium 138 mmol/L      Potassium 4.7 mmol/L      Chloride 98 mmol/L      CO2 31.0 mmol/L      Calcium 8.5 mg/dL      Total Protein 6.7 g/dL      Albumin 4.0 g/dL      ALT (SGPT) 8 U/L      AST (SGOT) 12 U/L      Alkaline Phosphatase 73 U/L      Total Bilirubin 0.3 mg/dL      Globulin 2.7 gm/dL      Comment: Calculated Result        A/G Ratio 1.5 g/dL      BUN/Creatinine Ratio 17.0     Anion Gap 9.0 mmol/L      eGFR 80.9 mL/min/1.73     Narrative:      GFR Normal >60  Chronic Kidney Disease <60  Kidney Failure <15    The GFR formula is only valid for adults with stable renal function between ages 18 and 70.    [place] Draw [[id number]] Collected: 03/17/23 2155    Specimen: Blood Updated: 03/18/23 0200    Narrative:      The following orders were created for panel order [place] Draw.  Procedure                               Abnormality         Status                     ---------                               -----------         ------                     Green Top (Gel)[[id number]]                                  Final result               Lavender Top[[id number]]                                     Final result               Gold Top - SST[[id number]]                                   Final result               Quinones Top[[id number]]                                         Final result               Light Blue Top[[id number]]                                   Final result                 Please view results for these tests on the individual orders.    Quinones Top [[id number]] Collected: 03/17/23 2155    Specimen: Blood Updated: 03/18/23 0200     Extra Tube Hold for add-ons.     Comment: Auto resulted.       Hemoglobin A1c [[id number]]  (Abnormal) Collected: 03/17/23 2155    Specimen: Blood Updated: 03/18/23 0153     Hemoglobin A1C 7.40 %     Narrative:      Hemoglobin A1C  Ranges:    Increased Risk for Diabetes  5.7% to 6.4%  Diabetes                     >= 6.5%  Diabetic Goal                < 7.0%    POC Glucose Once [[id number]]  (Abnormal) Collected: 03/18/23 0144    Specimen: Blood Updated: 03/18/23 0145     Glucose 178 mg/dL      Comment: Meter: [id number] : [id number] Magda Moreno       High Sensitivity Troponin T [[id number]]  (Abnormal) Collected: 03/18/23 0053    Specimen: Blood Updated: 03/18/23 0141     HS Troponin T 17 ng/L     Narrative:      High Sensitive Troponin T Reference Range:  <10.0 ng/L- Negative Female for AMI  <15.0 ng/L- Negative Male for AMI  >=10 - Abnormal Female indicating possible myocardial injury.  >=15 - Abnormal Male indicating possible myocardial injury.   Clinicians would have to utilize clinical acumen, EKG, Troponin, and serial changes to determine if it is an Acute Myocardial Infarction or myocardial injury due to an underlying chronic condition.         Urinalysis With Culture If Indicated - Urine, Clean Catch [[id number]]  (Abnormal) Collected: 03/17/23 2257    Specimen: Urine, Clean Catch Updated: 03/17/23 2313     Color, UA Yellow     Appearance, UA Clear     pH, UA 7.0     Specific Gravity, UA 1.033     Glucose, UA Negative     Ketones, UA Negative     Bilirubin, UA Negative     Blood, UA Negative     Protein, UA Negative     Leuk Esterase, UA Negative     Nitrite, UA Negative     Urobilinogen, UA 0.2 E.U./dL    Narrative:      In absence of clinical symptoms, the presence of pyuria, bacteria, and/or nitrites on the urinalysis result does not correlate with infection.  Urine microscopic not indicated.    Light Blue Top [[id number]] Collected: 03/17/23 2155    Specimen: Blood Updated: 03/17/23 2300     Extra Tube Hold for add-ons.     Comment: Auto resulted       Green Top (Gel) [[id number]] Collected: 03/17/23 2155    Specimen: Blood Updated: 03/17/23 2300     Extra Tube Hold for add-ons.     Comment: Auto resulted.       Lavender Top  [[id number]] Collected: 03/17/23 2155    Specimen: Blood Updated: 03/17/23 2300     Extra Tube hold for add-on     Comment: Auto resulted       Gold Top - SST [[id number]] Collected: 03/17/23 2155    Specimen: Blood Updated: 03/17/23 2300     Extra Tube Hold for add-ons.     Comment: Auto resulted.       aPTT [[id number]]  (Normal) Collected: 03/17/23 2155    Specimen: Blood Updated: 03/17/23 2226     PTT 32.9 seconds     Narrative:      PTT = The equivalent PTT values for the therapeutic range of heparin levels at 0.3 to 0.5 U/ml are 60 to 70 seconds.    Single High Sensitivity Troponin T [[id number]]  (Abnormal) Collected: 03/17/23 2155    Specimen: Blood Updated: 03/17/23 2221     HS Troponin T 19 ng/L     Narrative:      High Sensitive Troponin T Reference Range:  <10.0 ng/L- Negative Female for AMI  <15.0 ng/L- Negative Male for AMI  >=10 - Abnormal Female indicating possible myocardial injury.  >=15 - Abnormal Male indicating possible myocardial injury.   Clinicians would have to utilize clinical acumen, EKG, Troponin, and serial changes to determine if it is an Acute Myocardial Infarction or myocardial injury due to an underlying chronic condition.         CBC & Differential [[id number]]  (Abnormal) Collected: 03/17/23 2155    Specimen: Blood Updated: 03/17/23 2220    Narrative:      The following orders were created for panel order CBC & Differential.  Procedure                               Abnormality         Status                     ---------                               -----------         ------                     CBC Auto Differential[[id number]]        Abnormal            Final result                 Please view results for these tests on the individual orders.    CBC Auto Differential [[id number]]  (Abnormal) Collected: 03/17/23 2155    Specimen: Blood Updated: 03/17/23 2220     WBC 8.21 10*3/mm3      RBC 4.79 10*6/mm3      Hemoglobin 14.1 g/dL      Hematocrit 42.8 %      MCV 89.4 fL      MCH 29.4 pg       MCHC 32.9 g/dL      RDW 13.4 %      RDW-SD 43.8 fl      MPV 10.2 fL      Platelets 183 10*3/mm3      Neutrophil % 67.0 %      Lymphocyte % 20.5 %      Monocyte % 7.3 %      Eosinophil % 2.8 %      Basophil % 0.6 %      Immature Grans % 1.8 %      Neutrophils, Absolute 5.50 10*3/mm3      Lymphocytes, Absolute 1.68 10*3/mm3      Monocytes, Absolute 0.60 10*3/mm3      Eosinophils, Absolute 0.23 10*3/mm3      Basophils, Absolute 0.05 10*3/mm3      Immature Grans, Absolute 0.15 10*3/mm3      nRBC 0.0 /100 WBC     POC Creatinine [[id number]]  (Normal) Collected: 03/17/23 2139    Specimen: Venous Blood Updated: 03/17/23 2213     Creatinine 1.00 mg/dL      Comment: Serial Number: [id number]  [id number]       POC Protime / INR [[id number]]  (Abnormal) Collected: 03/17/23 2149    Specimen: Blood Updated: 03/17/23 2158     Protime 12.7 seconds      INR 1.1     Comment: Serial Number: [id number]:  [id number]           CT Angiogram Neck    Result Date: 3/17/2023  Impression: 1.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 There is some hard plaque in the area of the V4 segment of the left vertebral artery. 2.No significant carotid stenosis 3.No large vessel intracranial occlusion 4.No core infarct or tissue at risk based on rapid parameters Electronically Signed: Collins Muse  3/17/2023 10:08 PM EDT  Workstation ID: [id number]    MRI Brain Without Contrast    Result Date: 3/17/2023  Impression: 1.No restricted diffusion. 2.Small vessel ischemic change suggested. Electronically Signed: Collins Muse  3/17/2023 10:21 PM EDT  Workstation ID: [id number]    CT Head Without Contrast Stroke Protocol    Result Date: 3/17/2023  Impression: 1.There are some white matter changes which could reflect more chronic small vessel ischemic change. 2.Atherosclerotic vascular calcification is noted Report given to the CT tech at time of dictation. 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 There is some hard plaque in the area of the V4 segment of the left vertebral artery. 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 There is some hard plaque in the area of the V4 segment of the left vertebral artery. 2.No significant carotid stenosis 3.No large vessel intracranial occlusion 4.No core infarct or tissue at risk based on rapid parameters Electronically Signed: Collins Muse  3/17/2023 10:08 PM EDT  Workstation ID: [id number]    Results for orders placed during the hospital encounter of 10/29/21    Adult Transthoracic Echo Complete W/ Cont if Necessary Per Protocol (With Agitated Saline)    Interpretation Summary  · Estimated left ventricular EF = 60%  · The cardiac valves are anatomically and functionally normal.  · Normal agitated saline bubble study           Assessment/Plan     Assessment/Plan:  82-year-old who takes aspirin and Plavix presented with unsteadiness dizziness and vision issues, with a presenting systolic blood pressure of 240s.  Acute stroke imaging is unremarkable, MRI brain evidence no acute infarct however this was a hyperacute scan.  There could be a questionable left cerebellar peduncle restricted diffusion.    On my exam today, patient symptoms are improving, except his left fast beating nystagmus on the saccadic movements towards the left.  However, this is unidirectional which goes against a central cause.  But patient does not endorse any other peripheral signs.      Likely this could be hypertensive emergency versus a minor  ischemic posterior circulation stroke vs vascular neuritis.  We will continue to follow with the stroke work-up.  Repeat MRI brain with thin sections of the brainstem with coronal sequence.    Neurology will continue to follow the patient.        Jacobo Diehl MD  03/18/23  13:18 EDT

## 2023-03-18 NOTE — H&P
"    [facility] Medicine Services  HISTORY AND PHYSICAL    Patient Name: Vinnie Fulton  : 1940  MRN: [id number]  Primary Care Physician: Ivan Yoder MD  Date of admission: 3/17/2023    Subjective   Subjective     Chief Complaint:  Vertigo    HPI:  Vinnie Fulton is a 82 y.o. male with PMH of HTN, DM2, hypothyroidism, OA, GERD and CVA presents to the ED for vertigo. Around 6:30PM, patient developed dizziness. He states \"the TV looked like it was on a wheel\" and he couldn't focus. He was unable to stand due to dizziness so EMS was called. Patient also complains of chronic back pain and right leg weakness. However, he follows with [facility] for epidural injections. Patient denies missing medications. His BP usually runs in the 140s. His systolic was 240s on arrival to ED. Denies recent illness or ear pain. Denies fever, chest pain, shortness of breath, abdominal pain, N/VD, dysuria.         Review of Systems   Constitutional: Positive for activity change. Negative for appetite change, fatigue and fever.   HENT: Positive for congestion and postnasal drip. Negative for sore throat and trouble swallowing.    Eyes: Positive for visual disturbance.   Respiratory: Negative for cough, chest tightness and shortness of breath.    Cardiovascular: Negative for chest pain and leg swelling.   Gastrointestinal: Negative for abdominal pain, diarrhea, nausea and vomiting.   Genitourinary: Negative for difficulty urinating and dysuria.   Musculoskeletal: Positive for back pain (chronic) and gait problem (due to dizziness, off balance).   Skin: Negative.    Neurological: Positive for dizziness. Negative for syncope, speech difficulty, weakness and headaches.   Hematological: Negative.    Psychiatric/Behavioral: Negative for agitation and confusion.          Personal History     Past Medical History:   Diagnosis Date   • Fistula-in-ano    • GERD (gastroesophageal reflux disease)  "   • Hyperlipidemia    • Hypertension    • Hyperthyroidism    • Perirectal abscess    • Prostate cancer (HCC)          Oncology Problem List:  Prostate cancer (HCC) (10/29/2021; Status: Active)       Past Surgical History:   Procedure Laterality Date   • CARPAL TUNNEL RELEASE         Family History:  family history includes Cancer in his father; Diabetes in his father and mother; Heart disease in his father and mother.     Social History:  reports that he quit smoking about 23 years ago. His smoking use included cigarettes. He has a 1.00 pack-year smoking history. He has quit using smokeless tobacco. He reports that he does not drink alcohol and does not use drugs.  Social History     Social History Narrative   • Not on file       Medications:  ascorbic acid, aspirin, clopidogrel, doxazosin, levothyroxine, losartan, meclizine, metFORMIN ER, metoprolol tartrate, naproxen sodium, omeprazole, and spironolactone    Allergies   Allergen Reactions   • Lisinopril-Hydrochlorothiazide GI Intolerance   • Lortab [Hydrocodone-Acetaminophen]    • Tetanus Toxoids        Objective   Objective     Vital Signs:   Temp:  [98.6 °F (37 °C)] 98.6 °F (37 °C)  Heart Rate:  [60-90] 60  Resp:  [20] 20  BP: (165-225)/() 179/105    Physical Exam  Vitals reviewed.   Constitutional:       General: He is not in acute distress.     Appearance: He is normal weight.   HENT:      Head: Normocephalic.      Nose: Congestion present.      Mouth/Throat:      Mouth: Mucous membranes are moist.   Eyes:      Extraocular Movements: Extraocular movements intact.      Right eye: Nystagmus present.      Left eye: Nystagmus present.      Pupils: Pupils are equal, round, and reactive to light.   Cardiovascular:      Rate and Rhythm: Normal rate and regular rhythm.      Pulses: Normal pulses.      Heart sounds: Normal heart sounds. No murmur heard.  Pulmonary:      Effort: Pulmonary effort is normal. No respiratory distress.      Breath sounds: Normal breath  sounds. No wheezing or rhonchi.   Abdominal:      General: Bowel sounds are normal. There is no distension.      Palpations: Abdomen is soft.      Tenderness: There is no abdominal tenderness.   Musculoskeletal:         General: No swelling. Normal range of motion.      Cervical back: Normal range of motion. No rigidity.   Skin:     General: Skin is warm.      Capillary Refill: Capillary refill takes less than 2 seconds.   Neurological:      General: No focal deficit present.      Mental Status: He is alert and oriented to person, place, and time. Mental status is at baseline.      Motor: No weakness.   Psychiatric:         Mood and Affect: Mood normal.         Behavior: Behavior normal.         Thought Content: Thought content normal.         Judgment: Judgment normal.          Result Review:  I have personally reviewed the results from the time of this admission to 3/17/2023 23:38 EDT and agree with these findings:  [x]  Laboratory list / accordion  [x]  Microbiology  [x]  Radiology  []  EKG/Telemetry   []  Cardiology/Vascular   []  Pathology  [x]  Old records  []  Other:  Most notable findings include:     LAB RESULTS:      Lab 03/17/23 2155 03/17/23 2149   WBC 8.21  --    HEMOGLOBIN 14.1  --    HEMATOCRIT 42.8  --    PLATELETS 183  --    NEUTROS ABS 5.50  --    IMMATURE GRANS (ABS) 0.15*  --    LYMPHS ABS 1.68  --    MONOS ABS 0.60  --    EOS ABS 0.23  --    MCV 89.4  --    PROTIME  --  12.7*   APTT 32.9  --          Lab 03/17/23 2139   CREATININE 1.00             Lab 03/17/23 2155 03/17/23 2149   HSTROP T 19*  --    PROTIME  --  12.7*   INR  --  1.1                 Brief Urine Lab Results  (Last result in the past 365 days)      Color   Clarity   Blood   Leuk Est   Nitrite   Protein   CREAT   Urine HCG        03/17/23 2257 Yellow   Clear   Negative   Negative   Negative   Negative               Microbiology Results (last 10 days)     ** No results found for the last 240 hours. **          CT Angiogram  Neck    Result Date: 3/17/2023  CT CEREBRAL PERFUSION W WO CONTRAST, CT ANGIOGRAM NECK, CT ANGIOGRAM HEAD W AI ANALYSIS OF LVO Date of Exam: 3/17/2023 9:31 PM EDT Indication: Neuro deficit, acute, stroke suspected.  Comparison: CT head 3/17/2023 CTA head and neck October 29, 2021 Technique: Axial CT images of the brain were obtained prior to and after the administration of 115 cc of Isovue-370 . Core blood volume, core blood flow, mean transit time, and Tmax images were obtained utilizing the Rapid software protocol. A limited CT  angiogram of the head was also performed to measure the blood vessel density. Additional postcontrast images through the neck were obtained. 3-D volume rendered images were created The radiation dose reduction device was turned on for each scan per the ALARA (As Low as Reasonably Achievable) protocol. Findings: CT perfusion: CBF less than 30%: 0 cc T-Max greater than 6 seconds: 0 cc Mismatch volume 0 cc. CTA head and neck: There has been interval occlusion of the V4 segment of the right vertebral artery since prior CTA. The left vertebral artery reveals some hard plaque in the area of the V4 segment resulting in some narrowing of the vessel in this area. There is no significant carotid stenosis by NASCET criteria. Evaluation of the intracranial vasculature reveals no large vessel occlusion. Lower slices through the upper chest reveal some emphysematous changes. There are degenerative changes involving the cervical spine.     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 There is some hard plaque in the area of the V4 segment of the left vertebral artery. 2.No significant carotid stenosis 3.No large vessel intracranial occlusion 4.No core infarct or tissue at risk based on rapid parameters Electronically Signed: Collins Muse  3/17/2023 10:08 PM EDT  Workstation ID: [id number]    MRI Brain Without Contrast    Result Date: 3/17/2023  MRI BRAIN WO CONTRAST  Date of Exam: 3/17/2023 10:09 PM EDT Indication: Neuro deficit, acute, stroke suspected.  Comparison: MRI brain October 29, 2021 Technique:  Routine multiplanar/multisequence sequence images of the brain were obtained without contrast administration. Findings: There is no restricted diffusion. There are periventricular white matter changes which could reflect more chronic small vessel ischemic change. There is some cerebral atrophy.     Impression: Impression: 1.No restricted diffusion. 2.Small vessel ischemic change suggested. Electronically Signed: Collins Garciajanki  3/17/2023 10:21 PM EDT  Workstation ID: [id number]    CT Head Without Contrast Stroke Protocol    Result Date: 3/17/2023  CT HEAD WO CONTRAST STROKE PROTOCOL Date of Exam: 3/17/2023 9:29 PM EDT Indication: stroke. Comparison: CT head October 29, 2021 Technique: Axial CT images were obtained of the head without contrast administration.  Reconstructed coronal and sagittal images were also obtained. Automated exposure control and iterative construction methods were used. Findings: There are some white matter changes involving the cerebral hemispheres across reflect more chronic small vessel ischemic change. There is vascular calcification involving the distal vertebral arteries and carotid siphon areas. There is no underlying hemorrhage. There is no acute normality of the calvarium. The paranasal sinuses seem clear.     Impression: Impression: 1.There are some white matter changes which could reflect more chronic small vessel ischemic change. 2.Atherosclerotic vascular calcification is noted Report given to the CT tech at time of dictation. Electronically Signed: Collins Micha  3/17/2023 9:42 PM EDT  Workstation ID: [id number]    CT Angiogram Head w AI Analysis of LVO    Result Date: 3/17/2023  CT CEREBRAL PERFUSION W WO CONTRAST, CT ANGIOGRAM NECK, CT ANGIOGRAM HEAD W AI ANALYSIS OF LVO Date of Exam: 3/17/2023 9:31 PM EDT Indication: Neuro deficit, acute, stroke  suspected.  Comparison: CT head 3/17/2023 CTA head and neck October 29, 2021 Technique: Axial CT images of the brain were obtained prior to and after the administration of 115 cc of Isovue-370 . Core blood volume, core blood flow, mean transit time, and Tmax images were obtained utilizing the Rapid software protocol. A limited CT  angiogram of the head was also performed to measure the blood vessel density. Additional postcontrast images through the neck were obtained. 3-D volume rendered images were created The radiation dose reduction device was turned on for each scan per the ALARA (As Low as Reasonably Achievable) protocol. Findings: CT perfusion: CBF less than 30%: 0 cc T-Max greater than 6 seconds: 0 cc Mismatch volume 0 cc. CTA head and neck: There has been interval occlusion of the V4 segment of the right vertebral artery since prior CTA. The left vertebral artery reveals some hard plaque in the area of the V4 segment resulting in some narrowing of the vessel in this area. There is no significant carotid stenosis by NASCET criteria. Evaluation of the intracranial vasculature reveals no large vessel occlusion. Lower slices through the upper chest reveal some emphysematous changes. There are degenerative changes involving the cervical spine.     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 There is some hard plaque in the area of the V4 segment of the left vertebral artery. 2.No significant carotid stenosis 3.No large vessel intracranial occlusion 4.No core infarct or tissue at risk based on rapid parameters Electronically Signed: Collins Muse  3/17/2023 10:08 PM EDT  Workstation ID: [id number]    CT CEREBRAL PERFUSION WITH & WITHOUT CONTRAST    Result Date: 3/17/2023  CT CEREBRAL PERFUSION W WO CONTRAST, CT ANGIOGRAM NECK, CT ANGIOGRAM HEAD W AI ANALYSIS OF LVO Date of Exam: 3/17/2023 9:31 PM EDT Indication: Neuro deficit, acute, stroke suspected.  Comparison: CT head  3/17/2023 CTA head and neck October 29, 2021 Technique: Axial CT images of the brain were obtained prior to and after the administration of 115 cc of Isovue-370 . Core blood volume, core blood flow, mean transit time, and Tmax images were obtained utilizing the Rapid software protocol. A limited CT  angiogram of the head was also performed to measure the blood vessel density. Additional postcontrast images through the neck were obtained. 3-D volume rendered images were created The radiation dose reduction device was turned on for each scan per the ALARA (As Low as Reasonably Achievable) protocol. Findings: CT perfusion: CBF less than 30%: 0 cc T-Max greater than 6 seconds: 0 cc Mismatch volume 0 cc. CTA head and neck: There has been interval occlusion of the V4 segment of the right vertebral artery since prior CTA. The left vertebral artery reveals some hard plaque in the area of the V4 segment resulting in some narrowing of the vessel in this area. There is no significant carotid stenosis by NASCET criteria. Evaluation of the intracranial vasculature reveals no large vessel occlusion. Lower slices through the upper chest reveal some emphysematous changes. There are degenerative changes involving the cervical spine.     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 There is some hard plaque in the area of the V4 segment of the left vertebral artery. 2.No significant carotid stenosis 3.No large vessel intracranial occlusion 4.No core infarct or tissue at risk based on rapid parameters Electronically Signed: Collins Muse  3/17/2023 10:08 PM EDT  Workstation ID: [id number]      Results for orders placed during the hospital encounter of 10/29/21    Adult Transthoracic Echo Complete W/ Cont if Necessary Per Protocol (With Agitated Saline)    Interpretation Summary  · Estimated left ventricular EF = 60%  · The cardiac valves are anatomically and functionally normal.  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 male with PMH of HTN, DM2, hypothyroidism, OA, GERD and CVA presents to the ED for vertigo.    Vertigo  -CTA perfusion shows occlusion of the V4 segment of the right vertebral artery  -MRI brain shows small vessel ischemic change  -Meclizine   -Fall precautions   -Consult PT/OT  -Stroke navigator following   -Consult neuro     HTN Urgency  -Systolic 220s on arrival  -Given labetolol in ED with improvement of systolic to 170s  -Start Cardene drip, not to drop SBP less than 175 overnight with further reduction in a  -Continue home losartan, metoprolol and aldactone    DM2  -Pending HgbA1c  -SSI  -ACHS finger sticks    HLD  Hx of CVA  -Continue home aspirin, plavix    GERD  -Continue home PPI    Hypothyroidism   -Continue home hypothyroidism      DVT prophylaxis:  SCDs    CODE STATUS:  FULL       Expected Discharge  3/19/2023        This note has been completed as part of a split-shared workflow.     Signature: Electronically signed by MARIE Bullock, 03/18/23, 12:08 AM EDT.    Total APC Time: 60 minutes  Attending time: 25 minutes      Attending   Admission Attestation       I have performed an independent face-to-face diagnostic evaluation including performing an independent physical examination as documented here.  The documented plan of care above was reviewed and developed with the advanced practice clinician (APC).      Brief Summary Statement:   Vinnie Fulton is a 82 y.o. male With a PMH significant for diabetes mellitus type 2, HTN, HLD, OA, GERD, history of stroke, diabetes mellitus type 2 comes to the ED due to dizzy sensation.  Patient says  that he began to experience acute onset dizzy sensation around 630 this evening.  He said his blood pressure has been running high today.  He reports associated balance issues with difficulty walking.  He denies focal deficit, paresthesias.  Remainder of detailed HPI is as noted by APC and has been reviewed and/or edited by me for completeness.    Attending Physical Exam:  Temp:  [98.6 °F (37 °C)] 98.6 °F (37 °C)  Heart Rate:  [60-90] 66  Resp:  [20] 20  BP: (165-225)/() 171/107  Constitutional: Awake, alert  Eyes: PERRLA, sclerae anicteric, no conjunctival injection  HENT: NCAT, mucous membranes moist  Neck: Supple, no thyromegaly, no lymphadenopathy, trachea midline  Respiratory: Clear to auscultation bilaterally, nonlabored respirations   Cardiovascular: RRR, no murmurs, rubs, or gallops, palpable pedal pulses bilaterally  Gastrointestinal: Positive bowel sounds, soft, nontender, nondistended  Musculoskeletal: No bilateral ankle edema, no clubbing or cyanosis to extremities  Psychiatric: Appropriate affect, cooperative  Neurologic: Oriented x 3, strength symmetric in all extremities, Cranial Nerves grossly intact to confrontation, speech clear  Skin: No rashes    Brief Assessment/Plan :  See detailed assessment and plan developed with APC which I have reviewed and/or edited for completeness.            Latosha Saavedra,   03/18/23

## 2023-03-18 NOTE — PROGRESS NOTES
[facility] Medicine Services  PROGRESS NOTE    Patient Name: Vinnie Fulton  : 1940  MRN: [id number]    Date of Admission: 3/17/2023  Primary Care Physician: Ivan Yoder MD    Subjective   Subjective     CC:  Vision changes / confusion    HPI:   Doing well.  Improved.  Went for MRI this evening    ROS:    Gen- No fevers, chills  CV- No chest pain, palpitations  Resp- No cough, dyspnea  GI- No N/V/D, abd pain        Objective   Objective     Vital Signs:   Temp:  [97.7 °F (36.5 °C)-98.6 °F (37 °C)] 97.9 °F (36.6 °C)  Heart Rate:  [56-90] 59  Resp:  [16-20] 20  BP: (116-225)/() 151/94     Physical Exam:    Constitutional: No acute distress, awake, alert  HENT: NCAT, mucous membranes moist  Respiratory: Clear to auscultation bilaterally, respiratory effort normal   Cardiovascular: bradycardia, s1 and s2  Gastrointestinal: Positive bowel sounds, soft, nontender, nondistended  Musculoskeletal: No bilateral ankle edema  Psychiatric: Appropriate affect, cooperative  Neurologic: Oriented x 3, strength symmetric in all extremities, Cranial Nerves grossly intact to confrontation, speech clear  Skin: No rashes    Results Reviewed:  LAB RESULTS:      Lab 23   WBC 8.02 8.21  --    HEMOGLOBIN 12.6* 14.1  --    HEMATOCRIT 37.2* 42.8  --    PLATELETS 156 183  --    NEUTROS ABS 5.36 5.50  --    IMMATURE GRANS (ABS) 0.05 0.15*  --    LYMPHS ABS 1.78 1.68  --    MONOS ABS 0.58 0.60  --    EOS ABS 0.22 0.23  --    MCV 87.5 89.4  --    PROTIME  --   --  12.7*   APTT  --  32.9  --          Lab 23  0330 233 239   SODIUM 140 138  --   --    POTASSIUM 4.1 4.7  --   --    CHLORIDE 101 98  --   --    CO2 30.0* 31.0*  --   --    ANION GAP 9.0 9.0  --   --    BUN 15 16  --   --    CREATININE 0.86 0.94  --  1.00   EGFR 86.5 80.9  --   --    GLUCOSE 160* 188*  --   --    CALCIUM 8.2* 8.5*  --   --     MAGNESIUM 1.4*  --   --   --    HEMOGLOBIN A1C  --   --  7.40*  --          Lab 03/18/23 0053   TOTAL PROTEIN 6.7   ALBUMIN 4.0   GLOBULIN 2.7   ALT (SGPT) 8   AST (SGOT) 12   BILIRUBIN 0.3   ALK PHOS 73         Lab 03/18/23  0330 03/18/23  0053 03/17/23  2155 03/17/23 2149   HSTROP T 18* 17* 19*  --    PROTIME  --   --   --  12.7*   INR  --   --   --  1.1         Lab 03/18/23  0330   CHOLESTEROL 202*   LDL CHOL 149*   HDL CHOL 36*   TRIGLYCERIDES 95             Brief Urine Lab Results  (Last result in the past 365 days)      Color   Clarity   Blood   Leuk Est   Nitrite   Protein   CREAT   Urine HCG        03/17/23 2257 Yellow   Clear   Negative   Negative   Negative   Negative                 Microbiology Results Abnormal     None          CT Angiogram Neck    Result Date: 3/17/2023  CT CEREBRAL PERFUSION W WO CONTRAST, CT ANGIOGRAM NECK, CT ANGIOGRAM HEAD W AI ANALYSIS OF LVO Date of Exam: 3/17/2023 9:31 PM EDT Indication: Neuro deficit, acute, stroke suspected.  Comparison: CT head 3/17/2023 CTA head and neck October 29, 2021 Technique: Axial CT images of the brain were obtained prior to and after the administration of 115 cc of Isovue-370 . Core blood volume, core blood flow, mean transit time, and Tmax images were obtained utilizing the Rapid software protocol. A limited CT  angiogram of the head was also performed to measure the blood vessel density. Additional postcontrast images through the neck were obtained. 3-D volume rendered images were created The radiation dose reduction device was turned on for each scan per the ALARA (As Low as Reasonably Achievable) protocol. Findings: CT perfusion: CBF less than 30%: 0 cc T-Max greater than 6 seconds: 0 cc Mismatch volume 0 cc. CTA head and neck: There has been interval occlusion of the V4 segment of the right vertebral artery since prior CTA. The left vertebral artery reveals some hard plaque in the area of the V4 segment resulting in some narrowing of the  vessel in this area. There is no significant carotid stenosis by NASCET criteria. Evaluation of the intracranial vasculature reveals no large vessel occlusion. Lower slices through the upper chest reveal some emphysematous changes. There are degenerative changes involving the cervical spine.     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 There is some hard plaque in the area of the V4 segment of the left vertebral artery. 2.No significant carotid stenosis 3.No large vessel intracranial occlusion 4.No core infarct or tissue at risk based on rapid parameters Electronically Signed: Collins Muse  3/17/2023 10:08 PM EDT  Workstation ID: [id number]    MRI Brain Without Contrast    Result Date: 3/18/2023  MRI BRAIN WO CONTRAST Date of Exam: 3/18/2023 4:58 PM EDT Indication: Neuro deficit, acute, stroke suspected.  Comparison: One-day prior Technique:  Routine multiplanar/multisequence sequence images of the brain were obtained without contrast administration. Findings: No acute infarct is present on diffusion weighted sequences. Midline structures are normal and the craniocervical junction appears satisfactory. Age-related changes are present with volume loss and typical T2 hyperintense periventricular white matter changes. There is otherwise no evidence of intracranial hemorrhage, mass or mass effect. The ventricles are normal in size and configuration. The orbits are normal. The paranasal sinuses are grossly clear. Intracranial arterial flow voids are maintained.      Impression: Impression: No evidence of acute infarct. Moderate to severe age-related changes are noted as above. Electronically Signed: Esteban Vargas  3/18/2023 6:26 PM EDT  Workstation ID: [id number]    MRI Brain Without Contrast    Result Date: 3/17/2023  MRI BRAIN WO CONTRAST Date of Exam: 3/17/2023 10:09 PM EDT Indication: Neuro deficit, acute, stroke suspected.  Comparison: MRI brain October 29, 2021 Technique:   Routine multiplanar/multisequence sequence images of the brain were obtained without contrast administration. Findings: There is no restricted diffusion. There are periventricular white matter changes which could reflect more chronic small vessel ischemic change. There is some cerebral atrophy.     Impression: Impression: 1.No restricted diffusion. 2.Small vessel ischemic change suggested. Electronically Signed: Collins Muse  3/17/2023 10:21 PM EDT  Workstation ID: [id number]    CT Head Without Contrast Stroke Protocol    Result Date: 3/17/2023  CT HEAD WO CONTRAST STROKE PROTOCOL Date of Exam: 3/17/2023 9:29 PM EDT Indication: stroke. Comparison: CT head October 29, 2021 Technique: Axial CT images were obtained of the head without contrast administration.  Reconstructed coronal and sagittal images were also obtained. Automated exposure control and iterative construction methods were used. Findings: There are some white matter changes involving the cerebral hemispheres across reflect more chronic small vessel ischemic change. There is vascular calcification involving the distal vertebral arteries and carotid siphon areas. There is no underlying hemorrhage. There is no acute normality of the calvarium. The paranasal sinuses seem clear.     Impression: Impression: 1.There are some white matter changes which could reflect more chronic small vessel ischemic change. 2.Atherosclerotic vascular calcification is noted Report given to the CT tech at time of dictation. Electronically Signed: Collins Muse  3/17/2023 9:42 PM EDT  Workstation ID: [id number]    CT Angiogram Head w AI Analysis of LVO    Result Date: 3/17/2023  CT CEREBRAL PERFUSION W WO CONTRAST, CT ANGIOGRAM NECK, CT ANGIOGRAM HEAD W AI ANALYSIS OF LVO Date of Exam: 3/17/2023 9:31 PM EDT Indication: Neuro deficit, acute, stroke suspected.  Comparison: CT head 3/17/2023 CTA head and neck October 29, 2021 Technique: Axial CT images of the brain were obtained prior to and  after the administration of 115 cc of Isovue-370 . Core blood volume, core blood flow, mean transit time, and Tmax images were obtained utilizing the Rapid software protocol. A limited CT  angiogram of the head was also performed to measure the blood vessel density. Additional postcontrast images through the neck were obtained. 3-D volume rendered images were created The radiation dose reduction device was turned on for each scan per the ALARA (As Low as Reasonably Achievable) protocol. Findings: CT perfusion: CBF less than 30%: 0 cc T-Max greater than 6 seconds: 0 cc Mismatch volume 0 cc. CTA head and neck: There has been interval occlusion of the V4 segment of the right vertebral artery since prior CTA. The left vertebral artery reveals some hard plaque in the area of the V4 segment resulting in some narrowing of the vessel in this area. There is no significant carotid stenosis by NASCET criteria. Evaluation of the intracranial vasculature reveals no large vessel occlusion. Lower slices through the upper chest reveal some emphysematous changes. There are degenerative changes involving the cervical spine.     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 There is some hard plaque in the area of the V4 segment of the left vertebral artery. 2.No significant carotid stenosis 3.No large vessel intracranial occlusion 4.No core infarct or tissue at risk based on rapid parameters Electronically Signed: Collins Muse  3/17/2023 10:08 PM EDT  Workstation ID: [id number]    CT CEREBRAL PERFUSION WITH & WITHOUT CONTRAST    Result Date: 3/17/2023  CT CEREBRAL PERFUSION W WO CONTRAST, CT ANGIOGRAM NECK, CT ANGIOGRAM HEAD W AI ANALYSIS OF LVO Date of Exam: 3/17/2023 9:31 PM EDT Indication: Neuro deficit, acute, stroke suspected.  Comparison: CT head 3/17/2023 CTA head and neck October 29, 2021 Technique: Axial CT images of the brain were obtained prior to and after the administration of 115 cc  of Isovue-370 . Core blood volume, core blood flow, mean transit time, and Tmax images were obtained utilizing the Rapid software protocol. A limited CT  angiogram of the head was also performed to measure the blood vessel density. Additional postcontrast images through the neck were obtained. 3-D volume rendered images were created The radiation dose reduction device was turned on for each scan per the ALARA (As Low as Reasonably Achievable) protocol. Findings: CT perfusion: CBF less than 30%: 0 cc T-Max greater than 6 seconds: 0 cc Mismatch volume 0 cc. CTA head and neck: There has been interval occlusion of the V4 segment of the right vertebral artery since prior CTA. The left vertebral artery reveals some hard plaque in the area of the V4 segment resulting in some narrowing of the vessel in this area. There is no significant carotid stenosis by NASCET criteria. Evaluation of the intracranial vasculature reveals no large vessel occlusion. Lower slices through the upper chest reveal some emphysematous changes. There are degenerative changes involving the cervical spine.     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 There is some hard plaque in the area of the V4 segment of the left vertebral artery. 2.No significant carotid stenosis 3.No large vessel intracranial occlusion 4.No core infarct or tissue at risk based on rapid parameters Electronically Signed: Collins Muse  3/17/2023 10:08 PM EDT  Workstation ID: [id number]      Results for orders placed during the hospital encounter of 10/29/21    Adult Transthoracic Echo Complete W/ Cont if Necessary Per Protocol (With Agitated Saline)    Interpretation Summary  · Estimated left ventricular EF = 60%  · The cardiac valves are anatomically and functionally normal.  · Normal agitated saline bubble study      Current medications:  Scheduled Meds:amLODIPine, 5 mg, Oral, Q24H  aspirin, 324 mg, Oral, Daily   Or  aspirin, 300 mg,  Rectal, Daily  clopidogrel, 75 mg, Oral, Daily  insulin lispro, 0-7 Units, Subcutaneous, 4x Daily With Meals & Nightly  levothyroxine, 150 mcg, Oral, Q AM  losartan, 100 mg, Oral, Daily  metoprolol tartrate, 100 mg, Oral, BID  pantoprazole, 40 mg, Oral, Daily  rosuvastatin, 20 mg, Oral, Nightly  sodium chloride, 10 mL, Intravenous, Q12H  sodium chloride, 10 mL, Intravenous, Q12H  spironolactone, 25 mg, Oral, Daily  terazosin, 5 mg, Oral, Nightly      Continuous Infusions:niCARdipine, 5-15 mg/hr      PRN Meds:.•  acetaminophen **OR** acetaminophen **OR** acetaminophen  •  dextrose  •  dextrose  •  glucagon (human recombinant)  •  hydrOXYzine  •  meclizine  •  ondansetron **OR** ondansetron  •  sodium chloride  •  sodium chloride  •  sodium chloride  •  sodium chloride  •  sodium chloride  •  sodium chloride    Assessment & Plan   Assessment & Plan     Active Hospital Problems    Diagnosis  POA   • **Vertigo [R42]  Yes   • Osteoarthritis [M19.90]  Unknown   • 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) [I63.9]  Yes   • Type 2 diabetes mellitus (HCC) [E11.9]  Yes   • Esophageal reflux [K21.9]  Yes   • Essential hypertension, benign [I10]  Yes   • Generalized osteoarthrosis, involving multiple sites [M15.9]  Yes   • Hypothyroidism [E03.9]  Yes   • Hyperlipidemia [E78.5]  Yes      Resolved Hospital Problems   No resolved problems to display.        Brief Hospital Course to date:  Vinnie Fulton is a 82 y.o. male with past medical history of diabetes, obesity, hypertension, GERD, and CVA who presented to the ER with vertigo    Seen by stroke neurology team    MRI in progress     Vertigo  -CTA perfusion shows occlusion of the V4 segment of the right vertebral artery  -MRI brain shows small vessel ischemic change  -Meclizine   -Fall precautions   -Consult PT/OT  -Seen by stroke neurology  -He is on water pill spironolactone     HTN Urgency  -Systolic 220s on arrival  -Given labetolol in ED with improvement of  systolic to 170s  -Start Cardene drip, not to drop SBP less than 175 overnight with further reduction  -Continue home losartan, metoprolol and aldactone    DM2  -Pending HgbA1c  -SSI  -ACHS finger sticks     HLD  Hx of CVA  -Continue home aspirin, plavix     GERD  -Continue home PPI     Hypothyroidism   -Levothyroxine      Expected Discharge Location and Transportation:  home  Expected Discharge   Expected Discharge Date and Time     Expected Discharge Date Expected Discharge Time    Mar 19, 2023            DVT prophylaxis:  Mechanical DVT prophylaxis orders are present.     AM-PAC 6 Clicks Score (PT): 18 (03/18/23 1540)    CODE STATUS:   Code Status and Medical Interventions:   Ordered at: 03/18/23 0013     Level Of Support Discussed With:    Patient     Code Status (Patient has no pulse and is not breathing):    CPR (Attempt to Resuscitate)     Medical Interventions (Patient has pulse or is breathing):    Full Support       Kush Del Toro MD  03/18/23

## 2023-03-18 NOTE — THERAPY EVALUATION
Patient Name: Vinnie Fulton  : 1940    MRN: [id number]                              Today's Date: 3/18/2023       Admit Date: 3/17/2023    Visit Dx:     ICD-10-CM ICD-9-CM   1. Vertigo  R42 780.4   2. Hypertensive urgency  I16.0 401.9   3. Peripheral vertigo, unspecified laterality  H81.399 386.10     Patient Active Problem List   Diagnosis   • Paget's disease of bone   • Esophageal reflux   • Essential hypertension, benign   • Allergic rhinitis   • Generalized osteoarthrosis, involving multiple sites   • Hyperlipidemia   • Hypothyroidism   • Herpes zoster   • Cellulitis and abscess   • Hypertensive emergency   • Left facial numbness   • Prostate cancer (HCC)   • Hypomagnesemia   • Type 2 diabetes mellitus (HCC)   • Left-sided Bell's palsy   • 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)   • Tremor of both hands   • Shuffling gait   • Orthostatic hypotension   • Pain of hand   • Vertigo   • Osteoarthritis     Past Medical History:   Diagnosis Date   • Fist[place]-in-[place]    • GERD (gastroesophageal reflux disease)    • Hyperlipidemia    • Hypertension    • Hyperthyroidism    • Perirectal abscess    • Prostate cancer (HCC)      Past Surgical History:   Procedure Laterality Date   • CARPAL TUNNEL RELEASE        General Information     Row Name 23 1540          Physical Therapy Time and Intention    Document Type evaluation  -NS     Mode of Treatment individual therapy;physical therapy  -NS     Row Name 23 1540          General Information    Patient Profile Reviewed yes  -NS     Prior Level of Function independent:;all household mobility;community mobility;gait;transfer;bed mobility;ADL's  Pt does not use AD for mobility around home but uses a walking stick for community mobility.  -NS     Existing Precautions/Restrictions fall;other (see comments)  dizziness with position changes; visual changes  -NS     Barriers to Rehab medically complex  -NS     Row Name 23 1544          Living  Environment    People in Home spouse  -NS     Row Name 03/18/23 1540          Home Main Entrance    Number of Stairs, Main Entrance none  -NS     Row Name 03/18/23 1540          Stairs Within Home, Primary    Number of Stairs, Within Home, Primary none  -NS     Row Name 03/18/23 1540          Cognition    Orientation Status (Cognition) oriented x 4  -NS     Row Name 03/18/23 1540          Safety Issues, Functional Mobility    Safety Issues Affecting Function (Mobility) sequencing abilities  -NS     Impairments Affecting Function (Mobility) balance;endurance/activity tolerance;strength  -NS     Comment, Safety Issues/Impairments (Mobility) Pt reports altered vision, some difficulty noted with tracking.  -NS           User Key  (r) = Recorded By, (t) = Taken By, (c) = Cosigned By    Initials Name Provider Type    Yaritza Koch PT Physical Therapist               Mobility     Row Name 03/18/23 1540          Bed Mobility    Bed Mobility supine-sit  -NS     Supine-Sit [place] (Bed Mobility) minimum assist (75% patient effort);verbal cues  -NS     Assistive Device (Bed Mobility) bed rails;head of bed elevated  -NS     Comment, (Bed Mobility) assist at trunk, mild dizziness noted-BP stable sitting EOB  -NS     Row Name 03/18/23 1540          Transfers    Comment, (Transfers) BP stable in standing but very slight dizziness noted.  -NS     Row Name 03/18/23 1540          Sit-Stand Transfer    Sit-Stand [place] (Transfers) contact guard  -NS     Assistive Device (Sit-Stand Transfers) cane, straight  -NS     Row Name 03/18/23 1540          Gait/Stairs (Locomotion)    [place] Level (Gait) minimum assist (75% patient effort)  -NS     Assistive Device (Gait) cane, straight  -NS     Distance in Feet (Gait) 240  -NS     Deviations/Abnormal Patterns (Gait) teresita decreased;gait speed decreased;stride length decreased  -NS     Bilateral Gait Deviations forward flexed posture;heel strike decreased  -NS     Comment,  (Gait/Stairs) Patient ambulated with step-through pattern, demonstrating some unsteadiness with 2 episodes of mild LOB requiring Min A to correct, but progressing to CGA. No LOB with head turns. Chair follow for safety due to reports of dizziness. Distance limited by fatigue.  -NS           User Key  (r) = Recorded By, (t) = Taken By, (c) = Cosigned By    Initials Name Provider Type    Yaritza Koch PT Physical Therapist               Obj/Interventions     Row Name 03/18/23 1540          Range of Motion Comprehensive    General Range of Motion bilateral lower extremity ROM WFL  -NS     Row Name 03/18/23 1540          Strength Comprehensive (MMT)    General Manual Muscle Testing (MMT) Assessment lower extremity strength deficits identified  -NS     Comment, General Manual Muscle Testing (MMT) Assessment BLEs: grossly 4+/5  -NS     [facility] Name 03/18/23 1540          Motor Skills    Motor Skills coordination  -NS     Coordination WFL;heel to shin;bilateral  -NS     Row Name 03/18/23 1540          Balance    Balance Assessment sitting static balance;sitting dynamic balance;standing static balance;standing dynamic balance  -NS     Static Sitting Balance standby assist  -NS     Dynamic Sitting Balance standby assist  -NS     Position, Sitting Balance unsupported;sitting edge of bed  -NS     Static Standing Balance contact guard  -NS     Dynamic Standing Balance minimal assist  -NS     Position/Device Used, Standing Balance supported;cane, straight  -NS     Row Name 03/18/23 1540          Sensory Assessment (Somatosensory)    Sensory Assessment (Somatosensory) LE sensation intact  -NS           User Key  (r) = Recorded By, (t) = Taken By, (c) = Cosigned By    Initials Name Provider Type    Yaritza Koch PT Physical Therapist               Goals/Plan     [facility] Name 03/18/23 1540          Bed Mobility Goal 1 (PT)    Activity/Assistive Device (Bed Mobility Goal 1, PT) sit to supine/supine to sit  -NS     [place]  Level/Cues Needed (Bed Mobility Goal 1, PT) standby assist  -NS     Time Frame (Bed Mobility Goal 1, PT) long term goal (LTG);2 weeks  -NS     Row Name 03/18/23 1540          Transfer Goal 1 (PT)    Activity/Assistive Device (Transfer Goal 1, PT) sit-to-stand/stand-to-sit;bed-to-chair/chair-to-bed  -NS     [place] Level/Cues Needed (Transfer Goal 1, PT) modified independence  -NS     Time Frame (Transfer Goal 1, PT) long term goal (LTG);2 weeks  -NS     Row Name 03/18/23 1540          Gait Training Goal 1 (PT)    Activity/Assistive Device (Gait Training Goal 1, PT) gait (walking locomotion);assistive device use  -NS     [place] Level (Gait Training Goal 1, PT) standby assist  -NS     Distance (Gait Training Goal 1, PT) 400  -NS     Time Frame (Gait Training Goal 1, PT) long term goal (LTG);2 weeks  -NS     Row Name 03/18/23 9620          Therapy Assessment/Plan (PT)    Planned Therapy Interventions (PT) balance training;bed mobility training;gait training;home exercise program;neuromuscular re-education;patient/family education;strengthening;transfer training  -NS           User Key  (r) = Recorded By, (t) = Taken By, (c) = Cosigned By    Initials Name Provider Type    Yaritza Koch, PT Physical Therapist               Clinical Impression     Row Name 03/18/23 0893          Pain    Pretreatment Pain Rating 0/10 - no pain  -NS     Posttreatment Pain Rating 0/10 - no pain  -NS     Pre/Posttreatment Pain Comment No pain reported with any activity.  -NS     Row Name 03/18/23 5390          Plan of Care Review    Plan of Care Reviewed With patient  -NS     Progress no change  -NS     Outcome Evaluation Patient presents with dizziness, balance impairments, mild weakness, and vision changes affecting functional mobility. He required intermittent Min A and SPC for ambulation in hallway, demonstrating some unsteadiness. Skilled IP PT warranted to address deficits and support return to independence. Patient would  benefit from IRF at discharge but will monitor progress closely.  -NS     Row Name 03/18/23 1540          Therapy Assessment/Plan (PT)    Patient/Family Therapy Goals Statement (PT) return to PLOF  -NS     Rehab Potential (PT) good, to achieve stated therapy goals  -NS     Criteria for Skilled Interventions Met (PT) yes;meets criteria;skilled treatment is necessary  -NS     Therapy Frequency (PT) daily  -NS     Predicted Duration of Therapy Intervention (PT) 2 weeks  -NS     Row Name 03/18/23 1540          Vital Signs    Pre Systolic BP Rehab 131  supine  -NS     Pre Treatment Diastolic BP 79  -NS     Intra Systolic BP Rehab 134  sitting  -NS     Intra Treatment Diastolic BP 80  -NS     Post Systolic BP Rehab 135  standing  -NS     Post Treatment Diastolic BP 89  -NS     Pretreatment Heart Rate (beats/min) 57  -NS     Posttreatment Heart Rate (beats/min) 61  -NS     Pre SpO2 (%) 93  -NS     O2 Delivery Pre Treatment room air  -NS     Post SpO2 (%) 93  -NS     O2 Delivery Post Treatment room air  -NS     Pre Patient Position Supine  -NS     Intra Patient Position Standing  -NS     Post Patient Position Sitting  -NS     Row Name 03/18/23 2180          Positioning and Restraints    Pre-Treatment Position in bed  -NS     Post Treatment Position chair  -NS     In Chair notified nsg;reclined;call light within reach;encouraged to call for assist;exit alarm on;legs elevated;waffle cushion  -NS           User Key  (r) = Recorded By, (t) = Taken By, (c) = Cosigned By    Initials Name Provider Type    Yaritza Koch, PT Physical Therapist               Outcome Measures     Row Name 03/18/23 9510          How much help from another person do you currently need...    Turning from your back to your side while in flat bed without using bedrails? 3  -NS     Moving from lying on back to sitting on the side of a flat bed without bedrails? 3  -NS     Moving to and from a bed to a chair (including a wheelchair)? 3  -NS     Standing  up from a chair using your arms (e.g., wheelchair, bedside chair)? 3  -NS     Climbing 3-5 steps with a railing? 3  -NS     To walk in hospital room? 3  -NS     AM-PAC 6 Clicks Score (PT) 18  -NS     Highest level of mobility 6 --> Walked 10 steps or more  -NS     Row Name 03/18/23 1540          Functional Assessment    Outcome Measure Options AM-PAC 6 Clicks Basic Mobility (PT)  -NS           User Key  (r) = Recorded By, (t) = Taken By, (c) = Cosigned By    Initials Name Provider Type    Yaritza Koch PT Physical Therapist                             Physical Therapy Education     Title: PT OT SLP Therapies (In Progress)     Topic: Physical Therapy (In Progress)     Point: Mobility training (Done)     Learning Progress Summary           Patient Acceptance, E, VU by NS at 3/18/2023 1633    Comment: safety with ambulation and transfers                   Point: Home exercise program (Not Started)     Learner Progress:  Not documented in this visit.          Point: Body mechanics (Done)     Learning Progress Summary           Patient Acceptance, E, VU by NS at 3/18/2023 1633    Comment: safety with ambulation and transfers                   Point: Precautions (Done)     Learning Progress Summary           Patient Acceptance, E, VU by NS at 3/18/2023 1633    Comment: safety with ambulation and transfers                               User Key     Initials Effective Dates Name Provider Type Rosa ALMONTE 06/16/21 -  Yaritza Santos PT Physical Therapist PT              PT Recommendation and Plan  Planned Therapy Interventions (PT): balance training, bed mobility training, gait training, home exercise program, neuromuscular re-education, patient/family education, strengthening, transfer training  Plan of Care Reviewed With: patient  Progress: no change  Outcome Evaluation: Patient presents with dizziness, balance impairments, mild weakness, and vision changes affecting functional mobility. He required intermittent  Min A and SPC for ambulation in hallway, demonstrating some unsteadiness. Skilled IP PT warranted to address deficits and support return to independence. Patient would benefit from IRF at discharge but will monitor progress closely.     Time Calculation:    PT Charges     Row Name 03/18/23 1540             Time Calculation    Start Time 1540  -NS      PT Received On 03/18/23  -NS      PT Goal Re-Cert Due Date 03/28/23  -NS         Timed Charges    88650 - Gait Training Minutes  8  -NS         Untimed Charges    PT Eval/Re-eval Minutes 46  -NS         Total Minutes    Timed Charges Total Minutes 8  -NS      Untimed Charges Total Minutes 46  -NS       Total Minutes 54  -NS            User Key  (r) = Recorded By, (t) = Taken By, (c) = Cosigned By    Initials Name Provider Type    Yaritza Koch PT Physical Therapist              Therapy Charges for Today     Code Description Service Date Service Provider Modifiers Qty    10130912439 HC GAIT TRAINING EA 15 MIN 3/18/2023 Yaritza Santos, PT GP 1    [id number] HC PT EVAL MOD COMPLEXITY 4 3/18/2023 Yaritza Santos, PT GP 1          PT G-Codes  Outcome Measure Options: AM-PAC 6 Clicks Basic Mobility (PT)  AM-PAC 6 Clicks Score (PT): 18  PT Discharge Summary  Anticipated Discharge Disposition (PT): inpatient rehabilitation facility    Yaritza Santos PT  3/18/2023

## 2023-03-18 NOTE — CONSULTS
"Stroke Consult Note    Patient Name: Vinnie Fulton   MRN: [id number]  Age: 82 y.o.  Sex: male  : 1940    Primary Care Physician: Ivan Yoder MD  Referring Physician: Dr. Saul Duarte  TIME STROKE TEAM CALLED:  EST     TIME PATIENT SEEN:  EST    Handedness: Left  Race:     Chief Complaint/Reason for Consultation: Dizziness, vision changes, gait instability    Subjective .  HPI:   Vinnie Fulton is an 82-year-old right-handed  male with a past medical history significant for GERD, hypertension, hyperlipidemia, osteoarthritis, thyroid disease, prostate cancer, tremor, T2DM, CVA ( lacunar infarct), and shingles.  He presents to [facility] emergency department via EMS this evening with acute complaints of dizziness, visual changes, and gait instability.  He reports no fall however does note that he needed to hold onto the walls and furniture to steady his gait.  He reports approximately 6:30 PM his symptoms began.  He is compliant with aspirin and Plavix at home.  Blood pressure via EMS was 250/140 on their arrival.     On arrival to [facility] NIH per my examination 0.  Blood pressure on arrival 225/120.  He currently complains of positional vertigo, room spinning dizziness, and describes his vision changes as the room \"spinning\".  Imaging was reviewed with Dr. Diehl.  CT head was negative for any acute infarct or hemorrhage.  CTA head and neck reveals a distal V4 occlusion however there is no corresponding perfusion deficit.  Hyperacute MRI was obtained as the patient was still with in thrombolytic therapy window however hyperacute MRI was negative, so the decision was made to forego thrombolytic therapy.  He will be admitted to [facility] under the hospital medicine team for further neurologic work-up and evaluation.    Last Known Normal Date/Time:  EST     Review of Systems   HENT: Negative for trouble swallowing.  "   Eyes: Positive for visual disturbance.   Respiratory: Negative for shortness of breath.    Cardiovascular: Negative for palpitations.   Gastrointestinal: Negative for nausea and vomiting.   Musculoskeletal: Positive for gait problem.   Neurological: Positive for dizziness and light-headedness. Negative for facial asymmetry, speech difficulty and weakness.   Psychiatric/Behavioral: Negative for agitation, confusion and sleep disturbance.        Past Medical History:   Diagnosis Date   • Fistula-in-ano    • GERD (gastroesophageal reflux disease)    • Hyperlipidemia    • Hypertension    • Hyperthyroidism    • Perirectal abscess    • Prostate cancer (HCC)      Past Surgical History:   Procedure Laterality Date   • CARPAL TUNNEL RELEASE       Family History   Problem Relation Age of Onset   • Heart disease Mother    • Diabetes Mother    • Heart disease Father    • Diabetes Father    • Cancer Father      Social History     Socioeconomic History   • Marital status:    Tobacco Use   • Smoking status: Former     Packs/day: 1.00     Years: 1.00     Pack years: 1.00     Types: Cigarettes     Quit date: 2000     Years since quittin.2   • Smokeless tobacco: Former   Vaping Use   • Vaping Use: Never used   Substance and Sexual Activity   • Alcohol use: No   • Drug use: No   • Sexual activity: Not Currently     Allergies   Allergen Reactions   • Lisinopril-Hydrochlorothiazide GI Intolerance   • Lortab [Hydrocodone-Acetaminophen]    • Tetanus Toxoids      Prior to Admission medications    Medication Sig Start Date End Date Taking? Authorizing Provider   ascorbic acid (VITAMIN C) 1000 MG tablet Take 1 tablet by mouth Daily. 16   ProviderTerra MD   aspirin 81 MG EC tablet Take 81 mg by mouth daily.    ProviderTerra MD   clopidogrel (PLAVIX) 75 MG tablet Take 1 tablet by mouth Daily. 22   Ivan Yoder MD   doxazosin (Cardura) 4 MG tablet Take 1 tablet by mouth Every Night.  11/23/22   Ivan Yoder MD   levothyroxine (SYNTHROID, LEVOTHROID) 150 MCG tablet Take 1 tablet by mouth Daily. 11/23/22   Ivan Yoder MD   losartan (Cozaar) 100 MG tablet Take 1 tablet by mouth Daily. 11/23/22   Ivan Yoder MD   meclizine (ANTIVERT) 25 MG tablet Take 1 tablet by mouth 3 (Three) Times a Day As Needed for Dizziness. 10/22/21   Linwood Peters PA   metFORMIN ER (GLUCOPHAGE-XR) 500 MG 24 hr tablet Take 2 tablets by mouth Daily With Breakfast. 11/23/22   Ivan Yoder MD   metoprolol tartrate (LOPRESSOR) 100 MG tablet Take 1 tablet by mouth 2 (Two) Times a Day. 11/23/22   Ivan Yoder MD   naproxen sodium (ALEVE) 220 MG tablet Every 12 (Twelve) Hours.    ProviderTerra MD   omeprazole (priLOSEC) 40 MG capsule Take 1 capsule by mouth Daily. 11/23/22   Ivan Yoder MD   spironolactone (Aldactone) 25 MG tablet Take 1 tablet by mouth Every Morning. 12/14/22   Ivan Yoder MD           Objective     Temp:  [98.6 °F (37 °C)] 98.6 °F (37 °C)  Heart Rate:  [90] 90  Resp:  [20] 20  BP: (225)/(120) 225/120     Neurological Exam  Mental Status  Awake, alert and oriented to person, place and time.Alert. Oriented to person, place and time. Oriented to person, place, and time. Speech is normal. Speech: Speech with a lisp. Language is fluent with no aphasia. Fund of knowledge is appropriate for level of education.    Cranial Nerves  CN II: Visual fields full to confrontation. Blinks to visual threat bilaterally.  CN III, IV, VI: Nystagmus present: Pupils equal round and reactive to light bilaterally. Horizontal nystagmus .  CN V: Facial sensation is normal.  CN VII: Full and symmetric facial movement.  CN VIII: [place] at baseline, intact bilaterally.  CN IX, X: Palate elevates symmetrically  CN XI: Shoulder shrug strength is normal.  CN XII: Tongue midline without atrophy or  fasciculations.    Motor  Normal muscle bulk throughout. No fasciculations present. Normal muscle tone. Strength is 5/5 throughout all four extremities.    Sensory  Light touch is normal in upper and lower extremities.     Coordination    Finger-to-nose, rapid alternating movements and heel-to-shin normal bilaterally without dysmetria.    Gait    Not observed secondary to the acuity of patient's condition.      Physical Exam  Vitals reviewed.   Constitutional:       General: He is not in acute distress.     Appearance: Normal appearance.   HENT:      Head: Normocephalic and atraumatic.      Mouth/Throat:      Pharynx: Oropharynx is clear.   Eyes:      Extraocular Movements: Nystagmus present.      Pupils: Pupils are equal, round, and reactive to light.   Cardiovascular:      Rate and Rhythm: Normal rate.   Pulmonary:      Effort: Pulmonary effort is normal.   Abdominal:      General: Abdomen is flat.   Musculoskeletal:         General: Normal range of motion.   Skin:     General: Skin is warm and dry.   Neurological:      Mental Status: He is alert and oriented to person, place, and time.      Motor: Motor strength is normal.      Coordination: Coordination is intact.   Psychiatric:         Mood and Affect: Mood normal.         Speech: Speech normal.         Behavior: Behavior normal.         Thought Content: Thought content normal.         Judgment: Judgment normal.       Acute Stroke Data    IV Thrombolytic (TPA/Tenecteplase) Inclusion / Exclusion Criteria    Time: 21:42 EDT  Person Administering Scale: MARIE Sheth    Inclusion Criteria  [x]   18 years of age or greater   [x]   Onset of symptoms < 4.5 hours before beginning treatment (stroke onset = time patient was last seen well or without symptoms).   []   Diagnosis of acute ischemic stroke causing measurable disabling deficit (Complete Hemianopia, Any Aphasia, Visual or Sensory Extinction, Any weakness limiting sustained effort against gravity)    []   Any remaining deficit considered potentially disabling in view of patient and practitioner   Exclusion criteria (Do not proceed with Alteplase if any are checked under exclusion criteria)  []   Onset unknown or GREATER than 4.5 hours   []   ICH on CT/MRI   []   CT demonstrates hypodensity representing acute or subacute infarct   []   Significant head trauma or prior stroke in the previous 3 months   []   Symptoms suggestive of subarachnoid hemorrhage   []   History of un-ruptured intracranial aneurysm GREATER than 10 mm   []   Recent intracranial or intraspinal surgery within the last 3 months   []   Arterial puncture at a non-compressible site in the previous 7 days   []   Active internal bleeding   []   Acute bleeding tendency   []   Platelet count LESS than 100,000 for known hematological diseases such as leukemia, thrombocytopenia or chronic cirrhosis   []   Current use of anticoagulant with INR GREATER than 1.7 or PT GREATER than 15 seconds, aPTT GREATER than 40 seconds   []   Heparin received within 48 hours, resulting in abnormally elevated aPTT GREATER than upper limit of normal   []   Current use of direct thrombin inhibitors or direct factor Xa inhibitors in the past 48 hours   []   Elevated blood pressure refractory to treatment (systolic GREATER than 185 mm/Hg or diastolic  GREATER than 110 mm/Hg   []   Suspected infective endocarditis and aortic arch dissection   []   Current use of therapeutic treatment dose of low-molecular-weight heparin (LMWH) within the previous 24 hours   []   Structural GI malignancy or bleed   Relative exclusion for all patients  []   Only minor nondisabling symptoms   []   Pregnancy   []   Seizure at onset with postictal residual neurological impairments   []   Major surgery or previous trauma within past 14 days   []   History of previous spontaneous ICH, intracranial neoplasm, or AV malformation   []   Postpartum (within previous 14 days)   []   Recent GI or urinary  tract hemorrhage (within previous 21 days)   []   Recent acute MI (within previous 3 months)   []   History of unruptured intracranial aneurysm LESS than 10 mm   []   History of ruptured intracranial aneurysm   []   Blood glucose LESS than 50 mg/dL (2.7 mmol/L)   []   Dural puncture within the last 7 days   []   Known GREATER than 10 cerebral microbleeds   Additional exclusions for patients with symptoms onset between 3 and 4.5 hours.  [x]   Age > 80.   []   On any anticoagulants regardless of INR  >>> Warfarin (Coumadin), Heparin, Enoxaparin (Lovenox), fondaparinux (Arixtra), bivalirudin (Angiomax), Argatroban, dabigatran (Pradaxa), rivaroxaban (Xarelto), or apixaban (Eliquis)   []   Severe stroke (NIHSS > 25).   []   History of BOTH diabetes and previous ischemic stroke.   []   The risks and benefits have been discussed with the patient or family related to the administration of IV alteplase for stroke symptoms.   []   I have discussed and reviewed the patient's case and imaging with the attending prior to IV Thrombolytic (TPA/Tenecteplase).   Not applicable Time Thrombolytic administered     Hyperacute MRI performed, negative for any acute infarct.  Only minor nondisabling symptoms present.    Hospital Meds:  Scheduled-   Infusions- No current facility-administered medications for this encounter.     PRNs-     Functional Status Prior to Current Stroke/Clayton Score: 1    NIH Stroke Scale  Time: 21:42 EDT  Person Administering Scale: MARIE Sheth    Interval: 24 hrs post onset of symptoms +/- 20 mins  1a. Level of Consciousness: 0-->Alert, keenly responsive  1b. LOC Questions: 0-->Answers both questions correctly  1c. LOC Commands: 0-->Performs both tasks correctly  2. Best Gaze: 0-->Normal  3. Visual: 0-->No visual loss  4. Facial Palsy: 0-->Normal symmetrical movements  5a. Motor Arm, Left: 0-->No drift, limb holds 90 (or 45) degrees for full 10 secs  5b. Motor Arm, Right: 0-->No drift, limb holds 90  (or 45) degrees for full 10 secs  6a. Motor Leg, Left: 0-->No drift, leg holds 30 degree position for full 5 secs  6b. Motor Leg, Right: 0-->No drift, leg holds 30 degree position for full 5 secs  7. Limb Ataxia: 0-->Absent  8. Sensory: 0-->Normal, no sensory loss  9. Best Language: 0-->No aphasia, normal  10. Dysarthria: 0-->Normal  11. Extinction and Inattention (formerly Neglect): 0-->No abnormality    Total (NIH Stroke Scale): 0    Results Reviewed:  I have personally reviewed current lab, radiology, and data and agree with results.    Results for orders placed during the hospital encounter of 10/29/21    Adult Transthoracic Echo Complete W/ Cont if Necessary Per Protocol (With Agitated Saline)    Interpretation Summary  · Estimated left ventricular EF = 60%  · The cardiac valves are anatomically and functionally normal.  · Normal agitated saline bubble study      CT Head Without Contrast Stroke Protocol    Result Date: 3/17/2023  CT HEAD WO CONTRAST STROKE PROTOCOL Date of Exam: 3/17/2023 9:29 PM EDT Indication: stroke. Comparison: CT head October 29, 2021 Technique: Axial CT images were obtained of the head without contrast administration.  Reconstructed coronal and sagittal images were also obtained. Automated exposure control and iterative construction methods were used. Findings: There are some white matter changes involving the cerebral hemispheres across reflect more chronic small vessel ischemic change. There is vascular calcification involving the distal vertebral arteries and carotid siphon areas. There is no underlying hemorrhage. There is no acute normality of the calvarium. The paranasal sinuses seem clear.     Impression: Impression: 1.There are some white matter changes which could reflect more chronic small vessel ischemic change. 2.Atherosclerotic vascular calcification is noted Report given to the CT tech at time of dictation. Electronically Signed: Collins Muse  3/17/2023 9:42 PM EDT   Workstation ID: [id number]    CT CEREBRAL PERFUSION W WO CONTRAST, CT ANGIOGRAM NECK, CT ANGIOGRAM HEAD W AI ANALYSIS OF LVO     Date of Exam: 3/17/2023 9:31 PM EDT     Indication: Neuro deficit, acute, stroke suspected.     Comparison: CT head 3/17/2023 CTA head and neck October 29, 2021     Technique: Axial CT images of the brain were obtained prior to and after the administration of 115 cc of Isovue-370 . Core blood volume, core blood flow, mean transit time, and Tmax images were obtained utilizing the Rapid software protocol. A limited CT   angiogram of the head was also performed to measure the blood vessel density. Additional postcontrast images through the neck were obtained. 3-D volume rendered images were created      The radiation dose reduction device was turned on for each scan per the ALARA (As Low as Reasonably Achievable) protocol.     Findings:  CT perfusion:  CBF less than 30%: 0 cc  T-Max greater than 6 seconds: 0 cc  Mismatch volume 0 cc.     CTA head and neck: There has been interval occlusion of the V4 segment of the right vertebral artery since prior CTA. The left vertebral artery reveals some hard plaque in the area of the V4 segment resulting in some narrowing of the vessel in this area.     There is no significant carotid stenosis by NASCET criteria.     Evaluation of the intracranial vasculature reveals no large vessel occlusion.     Lower slices through the upper chest reveal some emphysematous changes.     There are degenerative changes involving the cervical spine.     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 There is some hard plaque in the area of the V4 segment of the left vertebral artery.  2.No significant carotid stenosis  3.No large vessel intracranial occlusion  4.No core infarct or tissue at risk based on rapid parameters     Electronically Signed: Collins Muse    3/17/2023 10:08 PM EDT    Workstation ID: [id number]    MRI BRAIN WO  CONTRAST     Date of Exam: 3/17/2023 10:09 PM EDT     Indication: Neuro deficit, acute, stroke suspected.     Comparison: MRI brain October 29, 2021     Technique:  Routine multiplanar/multisequence sequence images of the brain were obtained without contrast administration.     Findings:   There is no restricted diffusion. There are periventricular white matter changes which could reflect more chronic small vessel ischemic change. There is some cerebral atrophy.     IMPRESSION:  Impression:   1.No restricted diffusion.  2.Small vessel ischemic change suggested.     Electronically Signed: Collins Muse    3/17/2023 10:21 PM EDT    Workstation ID: [id number]    Creatinine 1.0  INR 1.1  WBC 8.21  Platelets 183  Hemoglobin 14.1  Hematocrit 42.8    Urinalysis unremarkable     Assessment/Plan:    82-year-old male with multiple vascular risk factors including prior CVA who presented to [facility] emergency department with complaints of dizziness, visual changes, and gait instability.  He is compliant with aspirin and Plavix at home.  Blood pressure on arrival 225/120.  NIH on my examination 0.  CT head negative, CTA revealed occlusion of the right V4 segment that is new from previous CTA imaging, without perfusion deficit.  Hyperacute MRI was negative for any infarct.  He is not an endovascular therapy candidate secondary to lack of large vessel occlusion on CT perfusion imaging.  He was not considered to be a thrombolytic therapy candidate secondary to low NIH, risk outweigh benefit, as well as negative hyperacute MRI.    Antiplatelet: Aspirin and Plavix  Anticoagulant: None      1. Dizziness, visual changes, gait instability  -Differentials to include posterior CVA, hypertensive urgency, and BPPV  -TIA/CVA order set initiated  -Continue aspirin and Plavix, will increase aspirin to 325 mg daily  -Labetalol administered in the emergency department  -Goal SBP overnight 180-200, recommend slow decrease in blood  pressure  -Bedside dysphagia screening prior to any p.o. intake including medications  -P2 Y12 in AM  -Recommend full MRI brain without contrast 3-19 a.m. to fully assess for posterior circulation stroke  -Hemoglobin A1c and FLP in a.m.  -Lipitor 80 mg nightly  -Valium and meclizine provided in the ED for symptom management  -Meclizine 25 mg 3 times daily as needed  -TTE tomorrow, defer bubble study  -Cardiology consult in a.m., blood pressure management as this has been a difficult ongoing issue for the patient at home.  He typically follows with Dr. Diaz.  Patient and family request.  -Ambulate as tolerated  -PT/OT/SLP to see and assess in a.m.  -Diabetes educator to see and assess if appropriate  -Case management to see and assist with any discharge needs    Stroke neurology will continue to follow along with the patient.  Plan of care discussed with the Dr. Diehl, Dr. Duarte, as well as the patient and his wife who is present at the bedside.  Thank you for the consult the care of this patient.  Please call with any further questions or concerns.    Che Hernandez, APRN  March 17, 2023  21:42 EDT

## 2023-03-18 NOTE — ED PROVIDER NOTES
Subjective   History of Present Illness  Patient is a pleasant 82-year-old male with history of hypertension, and frequent episodes of vertigo as of late.  Presents tonight with vertigo and weakness.  He states that approximately 2 hours ago he had developed vertigo and vision changes.  When asked to detail this he states that the vision changes was everything going side to side, consistent with vertigo.  His legs, bilaterally, with weak and EMS was called.  Patient did not have any unilateral weakness or numbness.    He states he has had frequent episodes of mild vertigo but nothing like what he is experiencing right now.  Denies fever, chills, chest pain, shortness of breath, abdominal pain, vomiting, diarrhea, or other acute complaints.        Review of Systems   All other systems reviewed and are negative.      Past Medical History:   Diagnosis Date   • Fistula-in-ano    • GERD (gastroesophageal reflux disease)    • Hyperlipidemia    • Hypertension    • Hyperthyroidism    • Perirectal abscess    • Prostate cancer (HCC)        Allergies   Allergen Reactions   • Lisinopril-Hydrochlorothiazide GI Intolerance   • Lortab [Hydrocodone-Acetaminophen]    • Tetanus Toxoids        Past Surgical History:   Procedure Laterality Date   • CARPAL TUNNEL RELEASE         Family History   Problem Relation Age of Onset   • Heart disease Mother    • Diabetes Mother    • Heart disease Father    • Diabetes Father    • Cancer Father        Social History     Socioeconomic History   • Marital status:    Tobacco Use   • Smoking status: Former     Packs/day: 1.00     Years: 1.00     Pack years: 1.00     Types: Cigarettes     Quit date: 2000     Years since quittin.2   • Smokeless tobacco: Former   Vaping Use   • Vaping Use: Never used   Substance and Sexual Activity   • Alcohol use: No   • Drug use: No   • Sexual activity: Not Currently           Objective   Physical Exam  Vitals and nursing note reviewed.    Constitutional:       General: He is not in acute distress.     Appearance: Normal appearance.   HENT:      Head: Normocephalic and atraumatic.   Eyes:      Conjunctiva/sclera: Conjunctivae normal.      Pupils: Pupils are equal, round, and reactive to light.      Comments: Pronounced horizontal nystagmus present   Cardiovascular:      Rate and Rhythm: Normal rate and regular rhythm.      Heart sounds: Normal heart sounds.   Pulmonary:      Effort: No respiratory distress.      Breath sounds: Normal breath sounds.   Abdominal:      Palpations: Abdomen is soft.      Tenderness: There is no abdominal tenderness.   Musculoskeletal:         General: Normal range of motion.      Cervical back: Normal range of motion and neck supple.   Skin:     General: Skin is warm and dry.   Neurological:      Mental Status: He is alert and oriented to person, place, and time.      Cranial Nerves: No cranial nerve deficit.      Sensory: No sensory deficit.      Comments: No pronator drift   Normal finger to nose and heel to shin    Psychiatric:         Behavior: Behavior normal.         Thought Content: Thought content normal.         Procedures           ED Course       Latest Reference Range & Units 03/17/23 20:40 03/17/23 21:39 03/17/23 21:49 03/17/23 21:55   Total CO2 24 - 29 mmol/L 33 (H)      HS Troponin T <15 ng/L    19 (H)   Glucose 70 - 130 mg/dL 139 (H)      Sodium 138 - 146 mmol/L 138      Potassium 3.5 - 4.9 mmol/L 4.6      Chloride 98 - 109 mmol/L 98      Creatinine 0.60 - 1.30 mg/dL 1.10 1.00     BUN 8 - 26 mg/dL 25      Ionized Calcium 1.20 - 1.32 mmol/L 1.04 (L)      eGFR >60.0 mL/min/1.73 67.0      Hemoglobin A1C 4.80 - 5.60 %    7.40 (H)   Protime 12.8 - 15.2 seconds   12.7 (L)    INR 0.8 - 1.2    1.1    PTT 22.0 - 39.0 seconds    32.9   WBC 3.40 - 10.80 10*3/mm3    8.21   RBC 4.14 - 5.80 10*6/mm3    4.79   Hemoglobin 13.0 - 17.7 g/dL    14.1   Hemoglobin 12.0 - 17.0 g/dL 14.3      Hematocrit 37.5 - 51.0 %    42.8    Hematocrit 38 - 51 % 42      RDW 12.3 - 15.4 %    13.4   MCV 79.0 - 97.0 fL    89.4   MCH 26.6 - 33.0 pg    29.4   MCHC 31.5 - 35.7 g/dL    32.9   MPV 6.0 - 12.0 fL    10.2   Platelets 140 - 450 10*3/mm3    183   RDW-SD 37.0 - 54.0 fl    43.8   Neutrophil Rel % 42.7 - 76.0 %    67.0   Lymphocyte Rel % 19.6 - 45.3 %    20.5   Monocyte Rel % 5.0 - 12.0 %    7.3   Eosinophil Rel % 0.3 - 6.2 %    2.8   Basophil Rel % 0.0 - 1.5 %    0.6   Immature Granulocyte Rel % 0.0 - 0.5 %    1.8 (H)   Neutrophils Absolute 1.70 - 7.00 10*3/mm3    5.50   Lymphocytes Absolute 0.70 - 3.10 10*3/mm3    1.68   Monocytes Absolute 0.10 - 0.90 10*3/mm3    0.60   Eosinophils Absolute 0.00 - 0.40 10*3/mm3    0.23   Basophils Absolute 0.00 - 0.20 10*3/mm3    0.05   Immature Grans, Absolute 0.00 - 0.05 10*3/mm3    0.15 (H)   nRBC 0.0 - 0.2 /100 WBC    0.0   (H): Data is abnormally high  (L): Data is abnormally low    MRI Brain Without Contrast   Final Result   Impression:    No evidence of acute infarct. Moderate to severe age-related changes are noted as above.      Electronically Signed: Esteban Vargas     3/18/2023 6:26 PM EDT     Workstation ID: [id number]      MRI Brain Without Contrast   Final Result   Impression:    1.No restricted diffusion.   2.Small vessel ischemic change suggested.      Electronically Signed: Collins Muse     3/17/2023 10:21 PM EDT     Workstation ID: [id number]      CT Angiogram Head w AI Analysis of LVO   Final Result   Impression:   1.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 There is some hard plaque in the area of the V4 segment of the left vertebral artery.   2.No significant carotid stenosis   3.No large vessel intracranial occlusion   4.No core infarct or tissue at risk based on rapid parameters      Electronically Signed: Collins Muse     3/17/2023 10:08 PM EDT     Workstation ID: [id number]      CT Angiogram Neck   Final Result   Impression:   1.Occlusion of the V4 segment of the right vertebral  artery. There is some hard plaque in the area of the V4 segment of the left vertebral artery.   2.No significant carotid stenosis   3.No large vessel intracranial occlusion   4.No core infarct or tissue at risk based on rapid parameters      Electronically Signed: Collins Muse     3/17/2023 10:08 PM EDT     Workstation ID: [id number]      CT CEREBRAL PERFUSION WITH & WITHOUT CONTRAST   Final Result   Impression:   1.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 There is some hard plaque in the area of the V4 segment of the left vertebral artery.   2.No significant carotid stenosis   3.No large vessel intracranial occlusion   4.No core infarct or tissue at risk based on rapid parameters      Electronically Signed: Collins Muse     3/17/2023 10:08 PM EDT     Workstation ID: [id number]      CT Head Without Contrast Stroke Protocol   Final Result   Impression:   1.There are some white matter changes which could reflect more chronic small vessel ischemic change.   2.Atherosclerotic vascular calcification is noted      Report given to the CT tech at time of dictation.      Electronically Signed: Collins Muse     3/17/2023 9:42 PM EDT     Workstation ID: [id number]        Vitals:    03/17/23 2131 03/17/23 2132   BP: (!) 225/120    Pulse: 90    Resp: 20    Temp:  98.6 °F (37 °C)   SpO2: 96%      Medications   iopamidol (ISOVUE-370) 76 % injection 150 mL (115 mL Intravenous Given 3/17/23 2139)     ECG/EMG Results (last 24 hours)     ** No results found for the last 24 hours. **        No orders to display                                            Medical Decision Making  Central Vertigo ruled out.  Pronounced vertigo persists despite treatment in the ED.  Pt admitted for further evaluation and management.      Hypertensive urgency: complicated acute illness or injury  Peripheral vertigo, unspecified laterality: complicated acute illness or injury  Vertigo: complicated acute illness or injury  Amount and/or Complexity of  Data Reviewed  External Data Reviewed: notes.  Labs: ordered. Decision-making details documented in ED Course.  Radiology: ordered and independent interpretation performed. Decision-making details documented in ED Course.  ECG/medicine tests: ordered and independent interpretation performed. Decision-making details documented in ED Course.  Discussion of management or test interpretation with external provider(s): Case discussed with internal medicine attending who will admit for further evaluation and management.    Risk  Prescription drug management.  Decision regarding hospitalization.          Final diagnoses:   Vertigo   Hypertensive urgency   Peripheral vertigo, unspecified laterality       ED Disposition  ED Disposition     ED Disposition   Decision to Admit    Condition   --    Comment   Level of Care: Telemetry [5]   Diagnosis: Vertigo [131748]   Admitting Physician: COTY ECHEVARRIA [[id number]]   Attending Physician: COTY ECHEVARRIA [[id number]]   Bed Request Comments: Saul Mcrae DO  04/04/23 0918

## 2023-03-18 NOTE — PLAN OF CARE
Goal Outcome Evaluation:  Plan of Care Reviewed With: patient        Progress: no change  Outcome Evaluation: Patient presents with dizziness, balance impairments, mild weakness, and vision changes affecting functional mobility. He required intermittent Min A and SPC for ambulation in hallway, demonstrating some unsteadiness. Skilled IP PT warranted to address deficits and support return to independence. Patient would benefit from IRF at discharge but will monitor progress closely.

## 2023-03-19 ENCOUNTER — READMISSION MANAGEMENT (OUTPATIENT)
Dept: CALL CENTER | Facility: HOSPITAL | Age: 83
End: 2023-03-19
Payer: COMMERCIAL

## 2023-03-19 VITALS
DIASTOLIC BLOOD PRESSURE: 71 MMHG | HEART RATE: 50 BPM | RESPIRATION RATE: 18 BRPM | HEIGHT: 70 IN | OXYGEN SATURATION: 96 % | SYSTOLIC BLOOD PRESSURE: 120 MMHG | BODY MASS INDEX: 33.07 KG/M2 | WEIGHT: 231 LBS | TEMPERATURE: 98.1 F

## 2023-03-19 LAB
GLUCOSE BLDC GLUCOMTR-MCNC: 151 MG/DL (ref 70–130)
GLUCOSE BLDC GLUCOMTR-MCNC: 163 MG/DL (ref 70–130)
GLUCOSE BLDC GLUCOMTR-MCNC: 172 MG/DL (ref 70–130)
GLUCOSE BLDC GLUCOMTR-MCNC: 221 MG/DL (ref 70–130)
QT INTERVAL: 452 MS
QTC INTERVAL: 452 MS

## 2023-03-19 PROCEDURE — 63710000001 INSULIN LISPRO (HUMAN) PER 5 UNITS

## 2023-03-19 PROCEDURE — 99232 SBSQ HOSP IP/OBS MODERATE 35: CPT | Performed by: NURSE PRACTITIONER

## 2023-03-19 PROCEDURE — 99213 OFFICE O/P EST LOW 20 MIN: CPT | Performed by: INTERNAL MEDICINE

## 2023-03-19 PROCEDURE — 99239 HOSP IP/OBS DSCHRG MGMT >30: CPT | Performed by: NURSE PRACTITIONER

## 2023-03-19 PROCEDURE — G0378 HOSPITAL OBSERVATION PER HR: HCPCS

## 2023-03-19 PROCEDURE — 82962 GLUCOSE BLOOD TEST: CPT

## 2023-03-19 RX ORDER — PANTOPRAZOLE SODIUM 40 MG/1
40 TABLET, DELAYED RELEASE ORAL DAILY
Qty: 30 TABLET | Refills: 0 | Status: SHIPPED | OUTPATIENT
Start: 2023-03-20 | End: 2023-03-22 | Stop reason: SDUPTHER

## 2023-03-19 RX ORDER — ROSUVASTATIN CALCIUM 20 MG/1
20 TABLET, COATED ORAL NIGHTLY
Qty: 30 TABLET | Refills: 5 | Status: SHIPPED | OUTPATIENT
Start: 2023-03-19

## 2023-03-19 RX ORDER — AMLODIPINE BESYLATE 5 MG/1
5 TABLET ORAL
Qty: 30 TABLET | Refills: 5 | Status: SHIPPED | OUTPATIENT
Start: 2023-03-20

## 2023-03-19 RX ADMIN — INSULIN LISPRO 3 UNITS: 100 INJECTION, SOLUTION INTRAVENOUS; SUBCUTANEOUS at 12:09

## 2023-03-19 RX ADMIN — PANTOPRAZOLE SODIUM 40 MG: 40 TABLET, DELAYED RELEASE ORAL at 08:17

## 2023-03-19 RX ADMIN — ASPIRIN 81 MG 324 MG: 81 TABLET ORAL at 08:18

## 2023-03-19 RX ADMIN — AMLODIPINE BESYLATE 5 MG: 5 TABLET ORAL at 08:17

## 2023-03-19 RX ADMIN — SPIRONOLACTONE 25 MG: 25 TABLET ORAL at 08:17

## 2023-03-19 RX ADMIN — METOPROLOL TARTRATE 100 MG: 100 TABLET, FILM COATED ORAL at 08:17

## 2023-03-19 RX ADMIN — CLOPIDOGREL BISULFATE 75 MG: 75 TABLET ORAL at 08:17

## 2023-03-19 RX ADMIN — LEVOTHYROXINE SODIUM 150 MCG: 150 TABLET ORAL at 06:45

## 2023-03-19 RX ADMIN — LOSARTAN POTASSIUM 100 MG: 50 TABLET, FILM COATED ORAL at 08:17

## 2023-03-19 RX ADMIN — INSULIN LISPRO 2 UNITS: 100 INJECTION, SOLUTION INTRAVENOUS; SUBCUTANEOUS at 00:31

## 2023-03-19 NOTE — PROGRESS NOTES
Stroke Progress Note       Chief Complaint:  Confusion and vision changes     Subjective    Subjective     No adverse events overnight, no acute distress noted.  Questionable area of restricted diffusion noted in the left cerebellar peduncle felt to be artifactual in nature.    Objective      Temp:  [97.8 °F (36.6 °C)-98.5 °F (36.9 °C)] 97.8 °F (36.6 °C)  Heart Rate:  [] 48  Resp:  [18-20] 18  BP: (116-154)/(68-94) 154/83    NEURO    MENTAL STATUS: AAOx3, memory intact, fund of knowledge appropriate    LANG/SPEECH: Naming and repetition intact, fluent, follows 3-step commands    CRANIAL NERVES:    Pupils equal and reactive, EOMI intact, no gaze deviation, no nystagmus  No facial droop, cough and gag intact, shoulder shrug intact, tongue midline       MOTOR:  Moves all extremities equally    SENSORY: Normal to light touch all throughout     COORDINATION: Normal finger to nose and heel to shin, no tremor, no dysmetria    STATION: Not assessed due to patient condition    GAIT: Not assessed due to patient condition  Results Review:    I reviewed the patient's new clinical results.    Lab Results (last 24 hours)     Procedure Component Value Units Date/Time    POC Glucose Once [[id number]]  (Abnormal) Collected: 03/19/23 0720    Specimen: Blood Updated: 03/19/23 0721     Glucose 151 mg/dL      Comment: Meter: [id number] : [id number] Contreras Parisi       POC Glucose Once [[id number]]  (Abnormal) Collected: 03/19/23 0600    Specimen: Blood Updated: 03/19/23 0603     Glucose 163 mg/dL      Comment: Meter: [id number] : [id number] Puga Bee       POC Glucose Once [[id number]]  (Abnormal) Collected: 03/19/23 0004    Specimen: Blood Updated: 03/19/23 0006     Glucose 172 mg/dL      Comment: Meter: [id number] : [id number] Puga Bee       POC Glucose Once [[id number]]  (Abnormal) Collected: 03/18/23 1856    Specimen: Blood Updated: 03/18/23 1858     Glucose 223 mg/dL      Comment: Meter: [id number] :  [id number] Timoteo Bravo       POC Glucose Once [[id number]]  (Abnormal) Collected: 03/18/23 1801    Specimen: Blood Updated: 03/18/23 1802     Glucose 181 mg/dL      Comment: Meter: [id number] : [id number] Franko Erum B       POC Glucose Once [[id number]]  (Abnormal) Collected: 03/18/23 1718    Specimen: Blood Updated: 03/18/23 1724     Glucose 182 mg/dL      Comment: Meter: [id number] : [id number] Gunnar Adame       POC Glucose Once [[id number]]  (Abnormal) Collected: 03/18/23 1207    Specimen: Blood Updated: 03/18/23 1208     Glucose 192 mg/dL      Comment: Meter: [id number] : [id number] Franko Erum B           CT Angiogram Neck    Result Date: 3/17/2023  Impression: 1.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 There is some hard plaque in the area of the V4 segment of the left vertebral artery. 2.No significant carotid stenosis 3.No large vessel intracranial occlusion 4.No core infarct or tissue at risk based on rapid parameters Electronically Signed: Collins Muse  3/17/2023 10:08 PM EDT  Workstation ID: [id number]    MRI Brain Without Contrast    Result Date: 3/18/2023  Impression: No evidence of acute infarct. Moderate to severe age-related changes are noted as above. Electronically Signed: Esteban Vargas  3/18/2023 6:26 PM EDT  Workstation ID: [id number]    MRI Brain Without Contrast    Result Date: 3/17/2023  Impression: 1.No restricted diffusion. 2.Small vessel ischemic change suggested. Electronically Signed: Collins Muse  3/17/2023 10:21 PM EDT  Workstation ID: [id number]    CT Head Without Contrast Stroke Protocol    Result Date: 3/17/2023  Impression: 1.There are some white matter changes which could reflect more chronic small vessel ischemic change. 2.Atherosclerotic vascular calcification is noted Report given to the CT tech at time of dictation. Electronically Signed: Collins Muse  3/17/2023 9:42 PM EDT  Workstation ID: [id number]    CT Angiogram Head w AI Analysis of LVO    Result Date:  3/17/2023  Impression: 1.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 There is some hard plaque in the area of the V4 segment of the left vertebral artery. 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 There is some hard plaque in the area of the V4 segment of the left vertebral artery. 2.No significant carotid stenosis 3.No large vessel intracranial occlusion 4.No core infarct or tissue at risk based on rapid parameters Electronically Signed: Collins Garciajanki  3/17/2023 10:08 PM EDT  Workstation ID: [id number]    Results for orders placed during the hospital encounter of 10/29/21    Adult Transthoracic Echo Complete W/ Cont if Necessary Per Protocol (With Agitated Saline)    Interpretation Summary  · Estimated left ventricular EF = 60%  · The cardiac valves are anatomically and functionally normal.  · Normal agitated saline bubble study         Lipid Panel    Lipid Panel 3/18/23   Total Cholesterol 202 (A)   Triglycerides 95   HDL Cholesterol 36 (A)   VLDL Cholesterol 17   LDL Cholesterol  149 (A)   LDL/HDL Ratio 4.08   (A) Abnormal value                Lab 03/17/23  2155   HEMOGLOBIN A1C 7.40*       Assessment/Plan     Assessment/Plan:  82-year-old male with a PMH significant for CVA, T2DM, HTN, HLD, and thyroid disease who presented to [facility] on 3/17/2023 with complaints of unsteadiness, dizziness, and vision issues.  Initial BP was very elevated with systolics in the 240s.  Initial hyperacute MRI with a questionable left cerebellar peduncle restricted diffusion.  Repeat MRI yesterday with a continued area of possible restricted diffusion in the left cerebellar peduncle.  This was reviewed  by Dr. Diehl and Dr. Vargas who felt it is artifactual in nature.  Symptoms likely due to hypertensive urgency per Dr. Diehl.    Dizziness  - Likely due to hypertensive urgency   - Symptoms have resolved with improvement in blood pressure.  - Continue home aspirin and Plavix for secondary TIA/stroke prevention  - Stop home omeprazole as it can interfere with Plavix effectiveness.  - Start Protonix 40 mg daily.  - HLD; .  Patient previously intolerant to atorvastatin.  Started on rosuvastatin 20 mg per cardiology on this admission with plans to switch to Repatha if he is unable to tolerate.  Goal less than 70 for secondary stroke prevention.  - HTN; currently well controlled.  Goal BP less than 140/80.  Discussed with the patient the need to monitor his BP daily at home and keep a log for his PCP.  Patient verbalized understanding.  -T2DM; hemoglobin A1c 7.4.  Goal less than 7 for secondary stroke prevention.  Discussed with the patient.  - Discussed the importance of medication compliance Plavix 75mg daily, Aspirin 81mg daily and Crestor 20mg nightly and lifestyle modifications adequate control of blood pressure, adequate control of cholesterol (goal LDL <70), adequate control of glucose (<140, A1c goal <7), increased physical activity and implementation of healthy diet to help reduce the risk of future cerebrovascular events.  Also discussed the signs symptoms that would warrant the patient return back to the emergency department including unilateral weakness, unilateral numbness, visual disturbances, loss of balance, speech difficulties, and/or a sudden severe headache.  Patient verbalized understanding.  - Patient to follow-up with his PCP for blood pressure management.  - Plan discussed with Dr. Diehl, the patient, and MARIE Dominguez.  Patient is okay to discharge home from a neurology perspective.  Please call with any questions or concerns.    MARIE Hand,  AGACNP-BC  03/19/23  08:49 EDT

## 2023-03-19 NOTE — DISCHARGE SUMMARY
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      Hospital Course:  Vinnie Fulton is a 82 y.o. male with past medical history significant for diabetes, obesity, hypertension, GERD, and CVA who presented to the ED on 3/19/2023 with vertigo. Stroke neurology followed. MRI brain revealed small vessel ischemic changes. 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 He was recommended to continue Meclizine PRN. PT/OT recommended inpatient rehab but the patient refused and opted to return home with family assistance. He will be discharged home today in stable condition.    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  -Follow up with general neurology in 4-6 weeks.      HTN Urgency  -Systolic 220s on arrival  -s/p  labetolol in ED with improvement of systolic to 170s  -Goal BP <130/80 per cards   -Cardiology followed  -Continue home losartan, metoprolol, aldactone, hytrin  -Added Amlodipine 5 mg daily   -BP currently controlled  -Follow up with Cardiology in 4-6 weeks    DM2  -Hgb A1C 7.4 on 3/17  -Currently well controlled   -Resume home Metformin      HLD  Hx of CVA  -Continue home aspirin, plavix  -Started on Crestor      GERD  -d/c home Prilosec d/t interactions with Plavix  -Start Protonix      Hypothyroidism    -Levothyroxine    Discharge Follow Up Recommendations for outpatient labs/diagnostics:   Follow up with PCP in 1 week.  Follow up with Cardiology in 4-6 weeks.   Follow up with General neurology in 4-6 weeks.     Day of Discharge     HPI:   Patient seen resting in bed no apparent distress.  No acute events overnight per nursing.  He is feeling well today and reports his desire to discharge home.  We discussed PT OT recommendation for inpatient rehab but he does not feel that that is necessary.  He explains that he will have assistance at home if needed and is feeling stronger has been ambulating around the room with no reoccurring dizziness.  We discussed the importance of taking all medications as prescribed and keeping all recommended follow-up appointments.  He expressed no additional concerns this time.    Review of Systems  Gen- No fevers, chills  CV- No chest pain, palpitations  Resp- No cough, dyspnea  GI- No N/V/D, abd pain    Vital Signs:   Temp:  [97.8 °F (36.6 °C)-98.5 °F (36.9 °C)] 98.1 °F (36.7 °C)  Heart Rate:  [] 50  Resp:  [18-20] 18  BP: (120-154)/(70-94) 120/71      Physical Exam:  Constitutional: No acute distress, awake, alert  HENT: NCAT, mucous membranes moist  Respiratory: Clear to auscultation bilaterally, respiratory effort normal   Cardiovascular: bradycardic, no murmurs, cap refill brisk   Gastrointestinal: Positive bowel sounds, soft, nontender, nondistended  Musculoskeletal: No  BLE edema    Psychiatric: Appropriate affect, cooperative  Neurologic: Oriented x 3, moves all extremities, speech clear  Skin: warm, dry, no visible rash     Pertinent  and/or Most Recent Results     LAB RESULTS:      Lab 03/18/23 0330 03/17/23 2155 03/17/23 2149   WBC 8.02 8.21  --    HEMOGLOBIN 12.6* 14.1  --    HEMATOCRIT 37.2* 42.8  --    PLATELETS 156 183  --    NEUTROS ABS 5.36 5.50  --    IMMATURE GRANS (ABS) 0.05 0.15*  --    LYMPHS ABS 1.78 1.68  --    MONOS ABS 0.58 0.60  --    EOS ABS 0.22 0.23  --    MCV 87.5 89.4  --    PROTIME  --   --  12.7*   APTT  --  32.9  --          Lab 03/18/23 0330 03/18/23 0053 03/17/23 2155 03/17/23 2139   SODIUM 140 138  --   --    POTASSIUM 4.1 4.7  --   --    CHLORIDE 101 98  --   --    CO2 30.0* 31.0*  --   --    ANION GAP 9.0 9.0  --   --    BUN 15 16  --   --    CREATININE 0.86 0.94  --  1.00   EGFR 86.5 80.9  --   --    GLUCOSE 160* 188*  --   --    CALCIUM 8.2* 8.5*  --   --    MAGNESIUM 1.4*  --   --   --    HEMOGLOBIN A1C  --   --  7.40*  --          Lab 03/18/23 0053   TOTAL PROTEIN 6.7   ALBUMIN 4.0   GLOBULIN 2.7   ALT (SGPT) 8   AST (SGOT) 12   BILIRUBIN 0.3   ALK PHOS 73         Lab 03/18/23 0330 03/18/23 0053 03/17/23 2155 03/17/23 2149   HSTROP T 18* 17* 19*  --    PROTIME  --   --   --  12.7*   INR  --   --   --  1.1         Lab 03/18/23 0330   CHOLESTEROL 202*   LDL CHOL 149*   HDL CHOL 36*   TRIGLYCERIDES 95             Brief Urine Lab Results  (Last result in the past 365 days)      Color   Clarity   Blood   Leuk Est   Nitrite   Protein   CREAT   Urine HCG        03/17/23 2257 Yellow   Clear   Negative   Negative   Negative   Negative               Microbiology Results (last 10 days)     ** No results found for the last 240 hours. **          CT Angiogram Neck    Result Date: 3/17/2023  CT CEREBRAL PERFUSION W WO CONTRAST, CT ANGIOGRAM NECK, CT ANGIOGRAM HEAD W AI ANALYSIS OF LVO Date of Exam: 3/17/2023 9:31 PM EDT Indication: Neuro deficit,  acute, stroke suspected.  Comparison: CT head 3/17/2023 CTA head and neck October 29, 2021 Technique: Axial CT images of the brain were obtained prior to and after the administration of 115 cc of Isovue-370 . Core blood volume, core blood flow, mean transit time, and Tmax images were obtained utilizing the Rapid software protocol. A limited CT  angiogram of the head was also performed to measure the blood vessel density. Additional postcontrast images through the neck were obtained. 3-D volume rendered images were created The radiation dose reduction device was turned on for each scan per the ALARA (As Low as Reasonably Achievable) protocol. Findings: CT perfusion: CBF less than 30%: 0 cc T-Max greater than 6 seconds: 0 cc Mismatch volume 0 cc. CTA head and neck: There has been interval occlusion of the V4 segment of the right vertebral artery since prior CTA. The left vertebral artery reveals some hard plaque in the area of the V4 segment resulting in some narrowing of the vessel in this area. There is no significant carotid stenosis by NASCET criteria. Evaluation of the intracranial vasculature reveals no large vessel occlusion. Lower slices through the upper chest reveal some emphysematous changes. There are degenerative changes involving the cervical spine.     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 There is some hard plaque in the area of the V4 segment of the left vertebral artery. 2.No significant carotid stenosis 3.No large vessel intracranial occlusion 4.No core infarct or tissue at risk based on rapid parameters Electronically Signed: Collins Muse  3/17/2023 10:08 PM EDT  Workstation ID: [id number]    MRI Brain Without Contrast    Result Date: 3/18/2023  MRI BRAIN WO CONTRAST Date of Exam: 3/18/2023 4:58 PM EDT Indication: Neuro deficit, acute, stroke suspected.  Comparison: One-day prior Technique:  Routine multiplanar/multisequence sequence images of the brain were obtained  without contrast administration. Findings: No acute infarct is present on diffusion weighted sequences. Midline structures are normal and the craniocervical junction appears satisfactory. Age-related changes are present with volume loss and typical T2 hyperintense periventricular white matter changes. There is otherwise no evidence of intracranial hemorrhage, mass or mass effect. The ventricles are normal in size and configuration. The orbits are normal. The paranasal sinuses are grossly clear. Intracranial arterial flow voids are maintained.      Impression: No evidence of acute infarct. Moderate to severe age-related changes are noted as above. Electronically Signed: Esteban Vargas  3/18/2023 6:26 PM EDT  Workstation ID: [id number]    MRI Brain Without Contrast    Result Date: 3/17/2023  MRI BRAIN WO CONTRAST Date of Exam: 3/17/2023 10:09 PM EDT Indication: Neuro deficit, acute, stroke suspected.  Comparison: MRI brain October 29, 2021 Technique:  Routine multiplanar/multisequence sequence images of the brain were obtained without contrast administration. Findings: There is no restricted diffusion. There are periventricular white matter changes which could reflect more chronic small vessel ischemic change. There is some cerebral atrophy.     Impression: 1.No restricted diffusion. 2.Small vessel ischemic change suggested. Electronically Signed: oCllins Muse  3/17/2023 10:21 PM EDT  Workstation ID: [id number]    CT Head Without Contrast Stroke Protocol    Result Date: 3/17/2023  CT HEAD WO CONTRAST STROKE PROTOCOL Date of Exam: 3/17/2023 9:29 PM EDT Indication: stroke. Comparison: CT head October 29, 2021 Technique: Axial CT images were obtained of the head without contrast administration.  Reconstructed coronal and sagittal images were also obtained. Automated exposure control and iterative construction methods were used. Findings: There are some white matter changes involving the cerebral hemispheres across reflect more  chronic small vessel ischemic change. There is vascular calcification involving the distal vertebral arteries and carotid siphon areas. There is no underlying hemorrhage. There is no acute normality of the calvarium. The paranasal sinuses seem clear.     Impression: 1.There are some white matter changes which could reflect more chronic small vessel ischemic change. 2.Atherosclerotic vascular calcification is noted Report given to the CT tech at time of dictation. Electronically Signed: Collins Muse  3/17/2023 9:42 PM EDT  Workstation ID: [id number]    CT Angiogram Head w AI Analysis of LVO    Result Date: 3/17/2023  CT CEREBRAL PERFUSION W WO CONTRAST, CT ANGIOGRAM NECK, CT ANGIOGRAM HEAD W AI ANALYSIS OF LVO Date of Exam: 3/17/2023 9:31 PM EDT Indication: Neuro deficit, acute, stroke suspected.  Comparison: CT head 3/17/2023 CTA head and neck October 29, 2021 Technique: Axial CT images of the brain were obtained prior to and after the administration of 115 cc of Isovue-370 . Core blood volume, core blood flow, mean transit time, and Tmax images were obtained utilizing the Rapid software protocol. A limited CT  angiogram of the head was also performed to measure the blood vessel density. Additional postcontrast images through the neck were obtained. 3-D volume rendered images were created The radiation dose reduction device was turned on for each scan per the ALARA (As Low as Reasonably Achievable) protocol. Findings: CT perfusion: CBF less than 30%: 0 cc T-Max greater than 6 seconds: 0 cc Mismatch volume 0 cc. CTA head and neck: There has been interval occlusion of the V4 segment of the right vertebral artery since prior CTA. The left vertebral artery reveals some hard plaque in the area of the V4 segment resulting in some narrowing of the vessel in this area. There is no significant carotid stenosis by NASCET criteria. Evaluation of the intracranial vasculature reveals no large vessel occlusion. Lower slices through  the upper chest reveal some emphysematous changes. There are degenerative changes involving the cervical spine.     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 There is some hard plaque in the area of the V4 segment of the left vertebral artery. 2.No significant carotid stenosis 3.No large vessel intracranial occlusion 4.No core infarct or tissue at risk based on rapid parameters Electronically Signed: Collins Muse  3/17/2023 10:08 PM EDT  Workstation ID: [id number]    CT CEREBRAL PERFUSION WITH & WITHOUT CONTRAST    Result Date: 3/17/2023  CT CEREBRAL PERFUSION W WO CONTRAST, CT ANGIOGRAM NECK, CT ANGIOGRAM HEAD W AI ANALYSIS OF LVO Date of Exam: 3/17/2023 9:31 PM EDT Indication: Neuro deficit, acute, stroke suspected.  Comparison: CT head 3/17/2023 CTA head and neck October 29, 2021 Technique: Axial CT images of the brain were obtained prior to and after the administration of 115 cc of Isovue-370 . Core blood volume, core blood flow, mean transit time, and Tmax images were obtained utilizing the Rapid software protocol. A limited CT  angiogram of the head was also performed to measure the blood vessel density. Additional postcontrast images through the neck were obtained. 3-D volume rendered images were created The radiation dose reduction device was turned on for each scan per the ALARA (As Low as Reasonably Achievable) protocol. Findings: CT perfusion: CBF less than 30%: 0 cc T-Max greater than 6 seconds: 0 cc Mismatch volume 0 cc. CTA head and neck: There has been interval occlusion of the V4 segment of the right vertebral artery since prior CTA. The left vertebral artery reveals some hard plaque in the area of the V4 segment resulting in some narrowing of the vessel in this area. There is no significant carotid stenosis by NASCET criteria. Evaluation of the intracranial vasculature reveals no large vessel occlusion. Lower slices through the upper chest reveal some emphysematous  changes. There are degenerative changes involving the cervical spine.     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 There is some hard plaque in the area of the V4 segment of the left vertebral artery. 2.No significant carotid stenosis 3.No large vessel intracranial occlusion 4.No core infarct or tissue at risk based on rapid parameters Electronically Signed: Collins Muse  3/17/2023 10:08 PM EDT  Workstation ID: [id number]              Results for orders placed during the hospital encounter of 10/29/21    Adult Transthoracic Echo Complete W/ Cont if Necessary Per Protocol (With Agitated Saline)    Interpretation Summary  · Estimated left ventricular EF = 60%  · The cardiac valves are anatomically and functionally normal.  · Normal agitated saline bubble study      Plan for Follow-up of Pending Labs/Results: Follow up as directed     Discharge Details        Discharge Medications      New Medications      Instructions Start Date   amLODIPine 5 MG tablet  Commonly known as: NORVASC   5 mg, Oral, Every 24 Hours Scheduled   Start Date: March 20, 2023     rosuvastatin 20 MG tablet  Commonly known as: CRESTOR   20 mg, Oral, Nightly         Changes to Medications      Instructions Start Date   doxazosin 4 MG tablet  Commonly known as: Cardura  What changed: how much to take   4 mg, Oral, Nightly         Continue These Medications      Instructions Start Date   ascorbic acid 1000 MG tablet  Commonly known as: VITAMIN C   1 tablet, Oral, Daily      aspirin 81 MG EC tablet   81 mg, Oral, Daily      clopidogrel 75 MG tablet  Commonly known as: PLAVIX   75 mg, Oral, Daily      levothyroxine 150 MCG tablet  Commonly known as: SYNTHROID, LEVOTHROID   150 mcg, Oral, Daily      losartan 100 MG tablet  Commonly known as: Cozaar   100 mg, Oral, Daily      meclizine 25 MG tablet  Commonly known as: ANTIVERT   25 mg, Oral, 3 Times Daily PRN      metFORMIN  MG 24 hr tablet  Commonly known as:  GLUCOPHAGE-XR   1,000 mg, Oral, Daily With Breakfast      metoprolol tartrate 100 MG tablet  Commonly known as: LOPRESSOR   100 mg, Oral, 2 Times Daily      naproxen sodium 220 MG tablet  Commonly known as: ALEVE   Every 12 Hours      omeprazole 40 MG capsule  Commonly known as: priLOSEC   40 mg, Oral, Daily      spironolactone 25 MG tablet  Commonly known as: ALDACTONE   25 mg, Oral, Daily         Stop These Medications    metoprolol succinate  MG 24 hr tablet  Commonly known as: TOPROL-XL            Allergies   Allergen Reactions   • Atorvastatin Myalgia   • Lisinopril-Hydrochlorothiazide GI Intolerance   • Lortab [Hydrocodone-Acetaminophen]    • Tetanus Toxoids        Discharge Disposition: Home     Diet:  Hospital:  Diet Order   Procedures   • Diet: Cardiac Diets; Healthy Heart (2-3 Na+); Texture: Regular Texture (IDDSI 7); Fluid Consistency: Thin (IDDSI 0)       Activity: As tolerated   Activity Instructions     Activity as Tolerated             CODE STATUS:    Code Status and Medical Interventions:   Ordered at: 03/18/23 0013     Level Of Support Discussed With:    Patient     Code Status (Patient has no pulse and is not breathing):    CPR (Attempt to Resuscitate)     Medical Interventions (Patient has pulse or is breathing):    Full Support       Future Appointments   Date Time Provider Department Center   4/13/2023 11:00 AM Ivan Yoder MD [facility]       Additional Instructions for the Follow-ups that You Need to Schedule     Discharge Follow-up with PCP   As directed       Currently Documented PCP:    Ivan Yoder MD    PCP Phone Number:    [phone]     Follow Up Details: Follow up with PCP in 1 week.         Discharge Follow-up with Specified Provider: Follow up general neurology in 4-6 weeks.   As directed      To: Follow up general neurology in 4-6 weeks.         Discharge Follow-up with Specified Provider: Follow up with Cardiology in 4-6 weeks.   As  directed      To: Follow up with Cardiology in 4-6 weeks.                     Jose J Winchester, APRN  03/19/23      Time Spent on Discharge:  I spent  38  minutes on this discharge activity which included: face-to-face encounter with the patient, reviewing the data in the system, coordination of the care with the nursing staff as well as consultants, documentation, and entering orders.

## 2023-03-19 NOTE — OUTREACH NOTE
Prep Survey    Flowsheet Row Responses   [facility] facility patient discharged from? [place]   Is LACE score < 7 ? Yes   Eligibility [facility]   Date of Admission 03/17/23   Date of Discharge 03/19/23   Discharge Disposition Home or Self Care   Discharge diagnosis vertigo, HTN urgency   Does the patient have one of the following disease processes/diagnoses(primary or secondary)? Other   Does the patient have Home health ordered? No   Is there a DME ordered? No   Prep survey completed? Yes          Jany NARANJO - Registered Nurse

## 2023-03-19 NOTE — PROGRESS NOTES
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 No cardiac complaints.  Feels better today, blood pressure well controlled.    Objective   Vitals: /83 (BP Location: Left arm, Patient Position: Lying)   Pulse (!) 48   Temp 97.8 °F (36.6 °C) (Oral)   Resp 18   Ht 177.8 cm (70\")   Wt 105 kg (231 lb)   SpO2 95%   BMI 33.15 kg/m²     Physical Exam:  General Appearance:   · well developed  · well nourished  Neck:  · thyroid not enlarged  · supple  Respiratory:  · no respiratory distress  · normal breath sounds  · no rales  Cardiovascular:  · no jugular venous distention  · regular rhythm  · apical impulse normal  · S1 normal, S2 normal  · no S3, no S4   · no murmur  · no rub, no thrill  · carotid pulses normal; no bruit  · pedal pulses normal  · lower extremity edema: none    Skin:   warm, dry      Results:  Results from last 7 days   Lab Units 03/18/23  0330 03/17/23  2155   WBC 10*3/mm3 8.02 8.21   HEMOGLOBIN g/dL 12.6* 14.1   HEMATOCRIT % 37.2* 42.8   PLATELETS 10*3/mm3 156 183     Results from last 7 days   Lab Units 03/18/23  0330 03/18/23  0053 03/18/23  0053 03/17/23  2139   SODIUM mmol/L 140  --  138  --    POTASSIUM mmol/L 4.1  --  4.7  --    CHLORIDE mmol/L 101   < > 98  --    CO2 mmol/L 30.0*  --  31.0*  --    BUN mg/dL 15  --  16  --    CREATININE mg/dL 0.86  --  0.94 1.00   GLUCOSE mg/dL 160*  --  188*  --     < > = values in this interval not displayed.    "   Lab Results   Component Value Date    CHOL 202 (H) 03/18/2023    TRIG 95 03/18/2023    HDL 36 (L) 03/18/2023     (H) 03/18/2023    AST 12 03/18/2023    ALT 8 03/18/2023     Results from last 7 days   Lab Units 03/17/23  2155   HEMOGLOBIN A1C % 7.40*     Results from last 7 days   Lab Units 03/18/23  0330   CHOLESTEROL mg/dL 202*   TRIGLYCERIDES mg/dL 95   HDL CHOL mg/dL 36*   LDL CHOL mg/dL 149*             Results from last 7 days   Lab Units 03/17/23  2155 03/17/23  2149   PROTIME seconds  --  12.7*   INR   --  1.1   APTT seconds 32.9  --      Results from last 7 days   Lab Units 03/18/23  0330 03/18/23  0053 03/17/23  2155   HSTROP T ng/L 18* 17* 19*             Intake/Output Summary (Last 24 hours) at 3/19/2023 0821  Last data filed at 3/18/2023 1800  Gross per 24 hour   Intake 960 ml   Output 600 ml   Net 360 ml       I personally reviewed the patient's EKG/Telemetry data    Radiology Data:   MRI brain 3/18/23:  IMPRESSION:  Impression:   No evidence of acute infarct. Moderate to severe age-related changes are noted as above.    Current Medications:  amLODIPine, 5 mg, Oral, Q24H  aspirin, 324 mg, Oral, Daily   Or  aspirin, 300 mg, Rectal, Daily  clopidogrel, 75 mg, Oral, Daily  insulin lispro, 0-7 Units, Subcutaneous, 4x Daily With Meals & Nightly  levothyroxine, 150 mcg, Oral, Q AM  losartan, 100 mg, Oral, Daily  metoprolol tartrate, 100 mg, Oral, BID  pantoprazole, 40 mg, Oral, Daily  rosuvastatin, 20 mg, Oral, Nightly  sodium chloride, 10 mL, Intravenous, Q12H  sodium chloride, 10 mL, Intravenous, Q12H  spironolactone, 25 mg, Oral, Daily  terazosin, 5 mg, Oral, Nightly      niCARdipine, 5-15 mg/hr      Assessment and Plan:     1. Hypertensive emergency  /120s mmHg on arrival to the hospital   Improved after IV Labetalol 10mg  On Losartan 100mg, Metoprolol 100mg BID, Doxazosin 5mg, and Spironolactone 25mg at home  Added Amlodipine 5mg daily and he will continue this at discharge  Goal blood  pressure less than 130/80 given history of strokes. Continue to monitor home BPs and follow up with Dr. Diaz in this regard      2. Dizziness/vertigo sensation   - also reports horizontal nystagmus day of admission   - Stroke neurology following  - occlusion of V4 segment of right vertebral artery on CTA, however hyperacute MRI negative for acute infarct     3. Hyperlipidemia  - , not at target, goal LDL less than 70  -Changed atorvastatin to rosuvastatin, consider Repatha if intolerant to rosuvastatin      Okay for discharge home follow-up with Dr. Steele in 3-4 months  Electronically signed by Katina Reilly PA-C, 03/19/23, 8:24 AM EDT.  I have seen and examined the patient, performing a face-to-face diagnostic evaluation with plan of care reviewed and developed with the Advanced Practice Clinician and nursing staff. I have addended and modified the above history of present illness, physical examination, and assessment and plan to reflect my findings and impressions. All medical decision making performed by Dr. Robison.    Ivan Robison MD, FACC  03/19/23

## 2023-03-20 ENCOUNTER — TRANSITIONAL CARE MANAGEMENT TELEPHONE ENCOUNTER (OUTPATIENT)
Dept: CALL CENTER | Facility: HOSPITAL | Age: 83
End: 2023-03-20
Payer: COMMERCIAL

## 2023-03-20 ENCOUNTER — DOCUMENTATION (OUTPATIENT)
Dept: SOCIAL WORK | Facility: HOSPITAL | Age: 83
End: 2023-03-20
Payer: COMMERCIAL

## 2023-03-20 NOTE — OUTREACH NOTE
Call Center TCM Note    Flowsheet Row Responses   [facility] patient discharged from? [place]   Does the patient have one of the following disease processes/diagnoses(primary or secondary)? Other   TCM attempt successful? Yes   Call start time 1346   Call end time 1350   Discharge diagnosis vertigo, HTN urgency   Meds reviewed with patient/caregiver? Yes   Is the patient having any side effects they believe may be caused by any medication additions or changes? No   Does the patient have all medications ordered at discharge? No   What is keeping the patient from filling the prescriptions? Lost script/didn't receive  [pt states his pharmacy filled all of the RX's besides the Protonix-encouraged pt to contact pharmacy ]   Nursing Interventions Nurse called pharmacy   Notified Case Management Script issues   Is the patient taking all medications as directed (includes completed medication regime)? Yes   Comments Hosp dc fu apt on 3/22/23 with PCP group    Does the patient have an appointment with their PCP within 7 days of discharge? Yes   Has home health visited the patient within 72 hours of discharge? N/A   Psychosocial issues? No   Did the patient receive a copy of their discharge instructions? Yes   Nursing interventions Reviewed instructions with patient   What is the patient's perception of their health status since discharge? Improving   Is the patient/caregiver able to teach back signs and symptoms related to disease process for when to call PCP? Yes   Is the patient/caregiver able to teach back signs and symptoms related to disease process for when to call 911? Yes   Is the patient/caregiver able to teach back the hierarchy of who to call/visit for symptoms/problems? PCP, Specialist, Home health nurse, Urgent Care, ED, 911 Yes   If the patient is a current smoker, are they able to teach back resources for cessation? Not a smoker   TCM call completed? Yes   Call end time 1350          Cherie Santiago  RN    3/20/2023, 13:53 EDT

## 2023-03-20 NOTE — OUTREACH NOTE
Call Center TCM Note    Flowsheet Row Responses   [facility] patient discharged from? [place]   Does the patient have one of the following disease processes/diagnoses(primary or secondary)? Other   TCM attempt successful? No   Unsuccessful attempts Attempt 1  [No updated verbal release on file from PCP group ]          Cherie Santiago RN    3/20/2023, 09:31 EDT

## 2023-03-22 ENCOUNTER — OFFICE VISIT (OUTPATIENT)
Dept: FAMILY MEDICINE CLINIC | Facility: CLINIC | Age: 83
End: 2023-03-22
Payer: MEDICARE

## 2023-03-22 VITALS
OXYGEN SATURATION: 98 % | DIASTOLIC BLOOD PRESSURE: 64 MMHG | WEIGHT: 215 LBS | SYSTOLIC BLOOD PRESSURE: 108 MMHG | HEART RATE: 52 BPM | HEIGHT: 70 IN | BODY MASS INDEX: 30.78 KG/M2

## 2023-03-22 DIAGNOSIS — E11.69 TYPE 2 DIABETES MELLITUS WITH OTHER SPECIFIED COMPLICATION, WITHOUT LONG-TERM CURRENT USE OF INSULIN: Primary | ICD-10-CM

## 2023-03-22 DIAGNOSIS — R42 VERTIGO: ICD-10-CM

## 2023-03-22 DIAGNOSIS — F41.9 ANXIETY: ICD-10-CM

## 2023-03-22 DIAGNOSIS — K21.9 GASTROESOPHAGEAL REFLUX DISEASE WITHOUT ESOPHAGITIS: ICD-10-CM

## 2023-03-22 DIAGNOSIS — J01.00 ACUTE NON-RECURRENT MAXILLARY SINUSITIS: ICD-10-CM

## 2023-03-22 LAB
POC CREATININE URINE: 10
POC MICROALBUMIN URINE: 17.7

## 2023-03-22 RX ORDER — PANTOPRAZOLE SODIUM 40 MG/1
40 TABLET, DELAYED RELEASE ORAL DAILY
Qty: 30 TABLET | Refills: 0 | Status: SHIPPED | OUTPATIENT
Start: 2023-03-22 | End: 2023-03-22 | Stop reason: SDUPTHER

## 2023-03-22 RX ORDER — PANTOPRAZOLE SODIUM 40 MG/1
40 TABLET, DELAYED RELEASE ORAL DAILY
Qty: 90 TABLET | Refills: 3 | Status: SHIPPED | OUTPATIENT
Start: 2023-03-22

## 2023-03-22 RX ORDER — MECLIZINE HYDROCHLORIDE 25 MG/1
25 TABLET ORAL 3 TIMES DAILY PRN
Qty: 21 TABLET | Refills: 6 | Status: SHIPPED | OUTPATIENT
Start: 2023-03-22

## 2023-03-22 NOTE — PROGRESS NOTES
Subjective   Vinnie Fulton is a 82 y.o. male  Vertigo (Inpt at [facility] 3/17-3/19. Has f/u appts with cardio (Dr. Diaz) and neuro. RF meclizine. ), Diabetes, GERD (RF pantoprazole ), and Anxiety (Wants to discuss starting medication)      History of Present Illness  Patient is a pleasant 82-year-old white male who comes in for follow-up of inpatient from 3 17-3 19 diagnosed with vertigo has appointment with cardio Dr. Diaz and neurology needs refill of meclizine also comes in for follow-up of type 2 diabetes GERD and has chronic anxiety would like to start on medication patient is past due on microalbumin  The following portions of the patient's history were reviewed and updated as appropriate: allergies, current medications, past social history and problem list    Review of Systems   Constitutional: Negative for appetite change, diaphoresis and unexpected weight change.   Eyes: Negative for visual disturbance.   Respiratory: Negative for cough and chest tightness.    Cardiovascular: Negative for leg swelling.   Gastrointestinal: Negative for diarrhea and vomiting.   Skin: Negative for color change and rash.   Neurological: Negative for syncope, light-headedness and numbness.       Objective     Vitals:    03/22/23 1030   BP: 108/64   Pulse: 52   SpO2: 98%       Physical Exam  Vitals and nursing note reviewed.   Constitutional:       General: He is not in acute distress.     Appearance: Normal appearance. He is well-developed. He is not ill-appearing, toxic-appearing or diaphoretic.   Neck:      Thyroid: No thyromegaly.      Vascular: No carotid bruit or JVD.   Cardiovascular:      Rate and Rhythm: Normal rate and regular rhythm.      Pulses: Normal pulses.      Heart sounds: Normal heart sounds. No murmur heard.  Pulmonary:      Effort: Pulmonary effort is normal. No respiratory distress.      Breath sounds: Normal breath sounds.   Abdominal:      Palpations: Abdomen is soft. There is no mass.       Tenderness: There is no abdominal tenderness.   Musculoskeletal:      Cervical back: Neck supple.   Lymphadenopathy:      Cervical: No cervical adenopathy.   Skin:     General: Skin is warm and dry.   Neurological:      Mental Status: He is alert.      Sensory: No sensory deficit.         Assessment & Plan     Diagnoses and all orders for this visit:    1. Type 2 diabetes mellitus with other specified complication, without long-term current use of insulin (HCC) (Primary)    2. Acute non-recurrent maxillary sinusitis  -     meclizine (ANTIVERT) 25 MG tablet; Take 1 tablet by mouth 3 (Three) Times a Day As Needed for Dizziness.  Dispense: 21 tablet; Refill: 6    3. Vertigo  -     meclizine (ANTIVERT) 25 MG tablet; Take 1 tablet by mouth 3 (Three) Times a Day As Needed for Dizziness.  Dispense: 21 tablet; Refill: 6    4. Gastroesophageal reflux disease without esophagitis  -     pantoprazole (PROTONIX) 40 MG EC tablet; Take 1 tablet by mouth Daily.  Dispense: 90 tablet; Refill: 3    5. Anxiety  -     sertraline (Zoloft) 50 MG tablet; Take 1 tablet by mouth Daily.  Dispense: 30 tablet; Refill: 3    Other orders  -     Discontinue: pantoprazole (PROTONIX) 40 MG EC tablet; Take 1 tablet by mouth Daily.  Dispense: 30 tablet; Refill: 0     I spent 15 minutes in patient care: Reviewing records prior to the visit, examining the patient, entering orders and documentation    Part of this note may be an electronic transcription/translation of spoken language to printed text using the Dragon Dictation System.

## 2023-03-23 NOTE — OUTREACH NOTE
Case Management Call Center Follow-up    Flowsheet Row Responses   [facility] Call Center Tracking Reason? Other (specify in comments)   Other Tracking Comments pharmacy didn't fill prescription   Has the Call Center Case Management Follow-up issue been resolved? Yes   Follow-up Comments Pt was evaluated by his PCP yesterday.  The previous protonix prescription was cancelled and a new prescription was written by the PCP.  No further needs reported at this time.          ANABELA Go    3/23/2023, 10:44 EDT

## 2023-04-03 LAB
BUN BLDA-MCNC: 25 MG/DL (ref 8–26)
CA-I BLDA-SCNC: 1.04 MMOL/L (ref 1.2–1.32)
CHLORIDE BLDA-SCNC: 98 MMOL/L (ref 98–109)
CO2 BLDA-SCNC: 33 MMOL/L (ref 24–29)
CREAT BLDA-MCNC: 1.1 MG/DL (ref 0.6–1.3)
EGFRCR SERPLBLD CKD-EPI 2021: 67 ML/MIN/1.73
GLUCOSE BLDC GLUCOMTR-MCNC: 139 MG/DL (ref 70–130)
HCT VFR BLDA CALC: 42 % (ref 38–51)
HGB BLDA-MCNC: 14.3 G/DL (ref 12–17)
POTASSIUM BLDA-SCNC: 4.6 MMOL/L (ref 3.5–4.9)
SODIUM BLD-SCNC: 138 MMOL/L (ref 138–146)

## 2023-04-04 RX ORDER — SPIRONOLACTONE 25 MG/1
TABLET ORAL
Qty: 30 TABLET | Refills: 0 | Status: SHIPPED | OUTPATIENT
Start: 2023-04-04 | End: 2023-04-13 | Stop reason: SDUPTHER

## 2023-04-13 ENCOUNTER — OFFICE VISIT (OUTPATIENT)
Dept: FAMILY MEDICINE CLINIC | Facility: CLINIC | Age: 83
End: 2023-04-13
Payer: MEDICARE

## 2023-04-13 VITALS
HEART RATE: 59 BPM | TEMPERATURE: 96.5 F | RESPIRATION RATE: 16 BRPM | HEIGHT: 70 IN | OXYGEN SATURATION: 94 % | SYSTOLIC BLOOD PRESSURE: 126 MMHG | BODY MASS INDEX: 32.78 KG/M2 | DIASTOLIC BLOOD PRESSURE: 82 MMHG | WEIGHT: 229 LBS

## 2023-04-13 DIAGNOSIS — K21.9 GASTROESOPHAGEAL REFLUX DISEASE WITHOUT ESOPHAGITIS: ICD-10-CM

## 2023-04-13 PROCEDURE — 3079F DIAST BP 80-89 MM HG: CPT | Performed by: FAMILY MEDICINE

## 2023-04-13 PROCEDURE — 1160F RVW MEDS BY RX/DR IN RCRD: CPT | Performed by: FAMILY MEDICINE

## 2023-04-13 PROCEDURE — 3074F SYST BP LT 130 MM HG: CPT | Performed by: FAMILY MEDICINE

## 2023-04-13 PROCEDURE — 1159F MED LIST DOCD IN RCRD: CPT | Performed by: FAMILY MEDICINE

## 2023-04-13 PROCEDURE — 99213 OFFICE O/P EST LOW 20 MIN: CPT | Performed by: FAMILY MEDICINE

## 2023-04-13 RX ORDER — DOXAZOSIN MESYLATE 4 MG/1
4 TABLET ORAL NIGHTLY
Qty: 90 TABLET | Refills: 3 | Status: SHIPPED | OUTPATIENT
Start: 2023-04-13

## 2023-04-13 RX ORDER — SPIRONOLACTONE 25 MG/1
25 TABLET ORAL EVERY MORNING
Qty: 90 TABLET | Refills: 3 | Status: SHIPPED | OUTPATIENT
Start: 2023-04-13

## 2023-04-13 RX ORDER — PANTOPRAZOLE SODIUM 40 MG/1
40 TABLET, DELAYED RELEASE ORAL DAILY
Qty: 90 TABLET | Refills: 3 | Status: SHIPPED | OUTPATIENT
Start: 2023-04-13

## 2023-04-13 NOTE — PROGRESS NOTES
Subjective   Vinnie Fulton is a 82 y.o. male    Hypertension  Vertigo    History of Present Illness  The patient is present today for a 3-month recheck related to his hypertension. Since I last saw him, he was hospitalized with severe vertigo. That has significantly improved with treatment. His hospital workup was negative for any intracranial abnormalities other than a previously noted vertebral artery occlusion. His wife accompanies him.    He is doing well. He needs a refill on his doxazosin and pantoprazole.    The patient is trying to walk as much as possible but gets back pain and has to sit down. Occasionally he has balance issues.He would like a prescription for a walker.    The following portions of the patient's history were reviewed and updated as appropriate: allergies, current medications, past social history and problem list    Review of Systems   Constitutional: Negative.  Negative for fatigue and unexpected weight change.   Respiratory: Negative.  Negative for cough, chest tightness and shortness of breath.    Cardiovascular: Negative for chest pain, palpitations and leg swelling.   Gastrointestinal: Negative.  Negative for nausea.   Musculoskeletal: Positive for back pain. Negative for arthralgias, gait problem and myalgias.   Skin: Negative for color change and rash.   Neurological: Negative for dizziness, tremors, syncope, weakness, numbness and headaches.   Psychiatric/Behavioral: Negative for behavioral problems and dysphoric mood. The patient is not nervous/anxious.        Objective     Vitals:    04/13/23 1055   BP: 126/82   Pulse: 59   Resp: 16   Temp: 96.5 °F (35.8 °C)   SpO2: 94%       Physical Exam  Vitals and nursing note reviewed.   Constitutional:       General: He is not in acute distress.     Appearance: Normal appearance. He is well-developed. He is not ill-appearing, toxic-appearing or diaphoretic.   HENT:      Head: Normocephalic and atraumatic.   Eyes:      General: No scleral  icterus.     Conjunctiva/sclera: Conjunctivae normal.      Pupils: Pupils are equal, round, and reactive to light.   Neck:      Vascular: No carotid bruit or JVD.   Cardiovascular:      Rate and Rhythm: Normal rate and regular rhythm.      Pulses: Normal pulses.      Heart sounds: Normal heart sounds. No murmur heard.  Pulmonary:      Effort: Pulmonary effort is normal. No respiratory distress.      Breath sounds: Normal breath sounds.   Abdominal:      General: Bowel sounds are normal.      Palpations: Abdomen is soft.      Tenderness: There is no abdominal tenderness.   Musculoskeletal:      Lumbar back: Tenderness and bony tenderness present. No swelling, deformity or spasms. Decreased range of motion.   Skin:     General: Skin is warm and dry.   Neurological:      Mental Status: He is alert and oriented to person, place, and time.      Deep Tendon Reflexes: Reflexes are normal and symmetric.   Psychiatric:         Mood and Affect: Mood normal.         Behavior: Behavior normal.         Assessment & Plan     Problems Addressed this Visit        Gastrointestinal Abdominal     Esophageal reflux    Relevant Medications    pantoprazole (PROTONIX) 40 MG EC tablet   Diagnoses       Codes Comments    Gastroesophageal reflux disease without esophagitis     ICD-10-CM: K21.9  ICD-9-CM: 530.81         I spent 20 minutes in patient care: Reviewing records prior to the visit, examining the patient, entering orders and documentation    Part of this note may be an electronic transcription/translation of spoken language to printed text using the Dragon Dictation System.           Transcribed from ambient dictation for ARNEL Yoder MD by Tiffanie Dugan.  04/13/23   13:47 EDT  Patient or patient representative verbalized consent to the visit recording.  I have personally performed the services described in this document as transcribed by the above individual, and it is both accurate and complete.

## 2023-05-04 RX ORDER — METOPROLOL TARTRATE 100 MG/1
TABLET ORAL
Qty: 60 TABLET | Refills: 5 | Status: SHIPPED | OUTPATIENT
Start: 2023-05-04

## 2023-09-28 ENCOUNTER — HOSPITAL ENCOUNTER (OUTPATIENT)
Dept: CARDIOLOGY | Facility: HOSPITAL | Age: 83
Discharge: HOME | End: 2023-09-28
Attending: INTERNAL MEDICINE | Admitting: INTERNAL MEDICINE
Payer: MEDICARE

## 2023-09-28 VITALS
SYSTOLIC BLOOD PRESSURE: 181 MMHG | WEIGHT: 227.2 LBS | HEIGHT: 71 IN | HEART RATE: 77 BPM | TEMPERATURE: 97.1 F | DIASTOLIC BLOOD PRESSURE: 111 MMHG | RESPIRATION RATE: 16 BRPM | BODY MASS INDEX: 31.81 KG/M2 | OXYGEN SATURATION: 95 %

## 2023-09-28 DIAGNOSIS — I48.11 LONGSTANDING PERSISTENT ATRIAL FIBRILLATION: ICD-10-CM

## 2023-09-28 LAB
ANION GAP SERPL CALCULATED.3IONS-SCNC: 9 MMOL/L (ref 5–15)
BUN BLDA-MCNC: 10 MG/DL (ref 8–26)
BUN SERPL-MCNC: 11 MG/DL (ref 8–23)
BUN/CREAT SERPL: 13.9 (ref 7–25)
CA-I BLDA-SCNC: 1 MMOL/L (ref 1.2–1.32)
CALCIUM SPEC-SCNC: 7.2 MG/DL (ref 8.6–10.5)
CHLORIDE BLDA-SCNC: 98 MMOL/L (ref 98–109)
CHLORIDE SERPL-SCNC: 103 MMOL/L (ref 98–107)
CO2 BLDA-SCNC: 31 MMOL/L (ref 24–29)
CO2 SERPL-SCNC: 31 MMOL/L (ref 22–29)
CREAT BLDA-MCNC: 0.8 MG/DL (ref 0.6–1.3)
CREAT SERPL-MCNC: 0.79 MG/DL (ref 0.76–1.27)
DEPRECATED RDW RBC AUTO: 52.7 FL (ref 37–54)
EGFRCR SERPLBLD CKD-EPI 2021: 87.8 ML/MIN/1.73
EGFRCR SERPLBLD CKD-EPI 2021: 88.1 ML/MIN/1.73
ERYTHROCYTE [DISTWIDTH] IN BLOOD BY AUTOMATED COUNT: 16 % (ref 12.3–15.4)
GLUCOSE BLDC GLUCOMTR-MCNC: 148 MG/DL (ref 70–130)
GLUCOSE SERPL-MCNC: 152 MG/DL (ref 65–99)
HCT VFR BLD AUTO: 32 % (ref 37.5–51)
HCT VFR BLDA CALC: 31 % (ref 38–51)
HGB BLD-MCNC: 10.5 G/DL (ref 13–17.7)
HGB BLDA-MCNC: 10.5 G/DL (ref 12–17)
MCH RBC QN AUTO: 29.6 PG (ref 26.6–33)
MCHC RBC AUTO-ENTMCNC: 32.8 G/DL (ref 31.5–35.7)
MCV RBC AUTO: 90.1 FL (ref 79–97)
PLATELET # BLD AUTO: 127 10*3/MM3 (ref 140–450)
PMV BLD AUTO: 9.5 FL (ref 6–12)
POTASSIUM BLDA-SCNC: 3.5 MMOL/L (ref 3.5–4.9)
POTASSIUM SERPL-SCNC: 3.7 MMOL/L (ref 3.5–5.2)
RBC # BLD AUTO: 3.55 10*6/MM3 (ref 4.14–5.8)
SODIUM BLD-SCNC: 143 MMOL/L (ref 138–146)
SODIUM SERPL-SCNC: 143 MMOL/L (ref 136–145)
WBC NRBC COR # BLD: 4.76 10*3/MM3 (ref 3.4–10.8)

## 2023-09-28 PROCEDURE — 85027 COMPLETE CBC AUTOMATED: CPT | Performed by: INTERNAL MEDICINE

## 2023-09-28 PROCEDURE — 93005 ELECTROCARDIOGRAM TRACING: CPT | Performed by: INTERNAL MEDICINE

## 2023-09-28 PROCEDURE — 92960 CARDIOVERSION ELECTRIC EXT: CPT

## 2023-09-28 PROCEDURE — 85014 HEMATOCRIT: CPT

## 2023-09-28 PROCEDURE — 36415 COLL VENOUS BLD VENIPUNCTURE: CPT

## 2023-09-28 PROCEDURE — 80047 BASIC METABLC PNL IONIZED CA: CPT

## 2023-09-28 PROCEDURE — 80048 BASIC METABOLIC PNL TOTAL CA: CPT | Performed by: INTERNAL MEDICINE

## 2023-09-28 RX ORDER — PROPAFENONE HYDROCHLORIDE 300 MG/1
150 TABLET, COATED ORAL EVERY 8 HOURS
Qty: 90 TABLET | Refills: 6 | Status: SHIPPED | OUTPATIENT
Start: 2023-09-28

## 2023-09-28 RX ORDER — FLUMAZENIL 0.1 MG/ML
0.5 INJECTION INTRAVENOUS ONCE AS NEEDED
Status: DISCONTINUED | OUTPATIENT
Start: 2023-09-28 | End: 2023-09-28 | Stop reason: HOSPADM

## 2023-09-28 RX ORDER — NALOXONE HCL 0.4 MG/ML
0.4 VIAL (ML) INJECTION ONCE AS NEEDED
Status: DISCONTINUED | OUTPATIENT
Start: 2023-09-28 | End: 2023-09-28 | Stop reason: HOSPADM

## 2023-09-28 RX ORDER — MIDAZOLAM HYDROCHLORIDE 1 MG/ML
2-8 INJECTION INTRAMUSCULAR; INTRAVENOUS ONCE AS NEEDED
Status: DISCONTINUED | OUTPATIENT
Start: 2023-09-28 | End: 2023-09-28 | Stop reason: HOSPADM

## 2023-09-28 RX ORDER — FENTANYL CITRATE 50 UG/ML
50-100 INJECTION, SOLUTION INTRAMUSCULAR; INTRAVENOUS ONCE AS NEEDED
Status: DISCONTINUED | OUTPATIENT
Start: 2023-09-28 | End: 2023-09-28 | Stop reason: HOSPADM

## 2023-09-28 NOTE — H&P
[facility] - History & Physical     Vinnie Fulton  1940  2506/1      09/28/23      Identification:  Vinnie Fulton is a 83 y.o. male.      PCP:  Ivan Yoder MD        Chief Complaint: Atrial Flutter        Allergies:  is allergic to atorvastatin, lisinopril-hydrochlorothiazide, lortab [hydrocodone-acetaminophen], and tetanus toxoids.    Medications Prior to Admission   Medication Sig Dispense Refill Last Dose    amLODIPine (NORVASC) 5 MG tablet Take 1 tablet by mouth Daily. 30 tablet 5     ascorbic acid (VITAMIN C) 1000 MG tablet Take 1 tablet by mouth Daily.  1     aspirin 81 MG EC tablet Take 1 tablet by mouth Daily.       clopidogrel (PLAVIX) 75 MG tablet Take 1 tablet by mouth Daily. 30 tablet 5     doxazosin (Cardura) 4 MG tablet Take 1 tablet by mouth Every Night. 90 tablet 3     levothyroxine (SYNTHROID, LEVOTHROID) 150 MCG tablet Take 1 tablet by mouth Daily. 90 tablet 3     losartan (Cozaar) 100 MG tablet Take 1 tablet by mouth Daily. 90 tablet 3     meclizine (ANTIVERT) 25 MG tablet Take 1 tablet by mouth 3 (Three) Times a Day As Needed for Dizziness. 21 tablet 6     metFORMIN ER (GLUCOPHAGE-XR) 500 MG 24 hr tablet Take 2 tablets by mouth Daily With Breakfast. 180 tablet 3     metoprolol tartrate (LOPRESSOR) 100 MG tablet TAKE 1 TABLET BY MOUTH TWICE DAILY 60 tablet 5     naproxen sodium (ALEVE) 220 MG tablet Every 12 (Twelve) Hours.       pantoprazole (PROTONIX) 40 MG EC tablet Take 1 tablet by mouth Daily. 90 tablet 3     rosuvastatin (CRESTOR) 20 MG tablet Take 1 tablet by mouth Every Night. 30 tablet 5     sertraline (ZOLOFT) 50 MG tablet TAKE 1 TABLET BY MOUTH DAILY 30 tablet 3     spironolactone (ALDACTONE) 25 MG tablet Take 1 tablet by mouth Every Morning. 90 tablet 3        No current facility-administered medications for this encounter.        HPI:  Vinnie Fulton is an 82 yo CM with PMHx HTN, HLP, hypothyroidism, h/o CVA, DMII, GERD, vertigo, PAD, prostate  cancer, persistent atrial fibrillation. He was recently in office and reported fatigue, SOA.  EKG demonstrated Atrial Flutter.  Outpt monitor worn which also demonstrated atrial flutter throughout.  He presents today for ECV under the care of Dr. Diaz.  He has not missed any doses of NOAC.  He denies chest pain.  He still endorses SOA and fatigue.        Social History     Socioeconomic History    Marital status:    Tobacco Use    Smoking status: Former     Packs/day: 1.00     Years: 1.00     Pack years: 1.00     Types: Cigarettes     Quit date: 2000     Years since quittin.7    Smokeless tobacco: Former   Vaping Use    Vaping Use: Never used   Substance and Sexual Activity    Alcohol use: No    Drug use: No    Sexual activity: Not Currently     Family History   Problem Relation Age of Onset    Heart disease Mother     Diabetes Mother     Heart disease Father     Diabetes Father     Cancer Father      Past Surgical History:   Procedure Laterality Date    CARPAL TUNNEL RELEASE         Review of Systems:  Pertinent positives are listed above and in physical exam.  All others have been reviewed and are negative.     Objective:   Vitals:   vitals were not taken for this visit.  No intake or output data in the 24 hours ending 23 0824 Vitals reviewed in room.      Physical Exam:  General Appearance:    Alert, cooperative, in no acute distress   Head:    Normocephalic, without obvious abnormality, atraumatic   Eyes:            Lids and lashes normal, conjunctivae and sclerae normal, no   icterus, no pallor, corneas clear, PERRLA. + glasses   Ears:    Ears appear intact with no abnormalities noted   Throat:   No oral lesions, no thrush, oral mucosa moist   Neck:   No adenopathy, supple, trachea midline, no thyromegaly, no carotid bruit, no JVD   Back:     No kyphosis present, no scoliosis present, no skin lesions,   erythema or scars, no tenderness to percussion or                  palpation,   range of motion normal   Lungs:     Clear to auscultation,respirations regular, even and               unlabored    Heart:    Irregular rhythm and normal rate, normal S1 and S2, no         murmur, no gallop, no rub, no click   Abdomen:     Normal bowel sounds, no masses, no organomegaly, soft     nontender, nondistended, no guarding, no rebound               tenderness   Extremities:   Moves all extremities well,  no cyanosis, no redness, no edema   Pulses:   Pulses palpable and equal bilaterally   Skin:   No bleeding, bruising or rash   Lymph nodes:   No palpable adenopathy   Neurologic:   Cranial nerves 2 - 12 grossly intact, sensation intact, DTR    present and equal bilaterally          Results Review:  I personally reviewed the patient's clinical results.              Invalid input(s): LABALBU, PROT                        Radiology:  Imaging Results (Last 72 Hours)       ** No results found for the last 72 hours. **            Tele:  AFib    Assessment and Plan:    1.  84 yo CM with AFib/Flutter, symptomatic with SOA and fatigue, presents today to undergo ECV with Dr. Diaz.   -  Risks and benefits reviewed with patient and he is willing to proceed.  Further recommendations based on outcomes.   -  Continue NOAC   -  As we were about to start procedure, patient converted to NSR.  Confirmed by EKG.  We aborted procedure.  Will discharge patient home in stable condition.  We are going to add Propafenone 150mg TID to his medical regimen, no other changes.  He is to come to our office Monday 10/2 and get an EKG.  This was explained to the patient/wife/daughter  - all expressed understanding.        I discussed the patient's findings and my recommendations with the patient, any present family members, and the nursing staff.  Ever Diaz MD saw and examined patient, verified hx and PE, read all radiographic studies, reviewed labs and micro data, and formulated dx, plan for treatment and all medical decision  making.      Chayo Fitzgerald PA-C for Ever Diaz MD  09/28/23 08:24 EDT

## 2023-09-29 LAB
QT INTERVAL: 450 MS
QTC INTERVAL: 499 MS

## 2023-10-13 ENCOUNTER — OFFICE VISIT (OUTPATIENT)
Dept: FAMILY MEDICINE CLINIC | Facility: CLINIC | Age: 83
End: 2023-10-13
Payer: MEDICARE

## 2023-10-13 VITALS
HEIGHT: 71 IN | WEIGHT: 213 LBS | SYSTOLIC BLOOD PRESSURE: 130 MMHG | BODY MASS INDEX: 29.82 KG/M2 | OXYGEN SATURATION: 93 % | TEMPERATURE: 97.8 F | HEART RATE: 92 BPM | DIASTOLIC BLOOD PRESSURE: 88 MMHG

## 2023-10-13 DIAGNOSIS — D49.2 SKIN NEOPLASM: ICD-10-CM

## 2023-10-13 DIAGNOSIS — F41.9 ANXIETY: ICD-10-CM

## 2023-10-13 DIAGNOSIS — K21.9 GASTROESOPHAGEAL REFLUX DISEASE WITHOUT ESOPHAGITIS: ICD-10-CM

## 2023-10-13 DIAGNOSIS — E11.69 TYPE 2 DIABETES MELLITUS WITH OTHER SPECIFIED COMPLICATION, WITHOUT LONG-TERM CURRENT USE OF INSULIN: Primary | ICD-10-CM

## 2023-10-13 DIAGNOSIS — E03.9 ACQUIRED HYPOTHYROIDISM: ICD-10-CM

## 2023-10-13 DIAGNOSIS — E03.8 OTHER SPECIFIED HYPOTHYROIDISM: ICD-10-CM

## 2023-10-13 DIAGNOSIS — I10 ESSENTIAL HYPERTENSION, BENIGN: ICD-10-CM

## 2023-10-13 DIAGNOSIS — C61 PROSTATE CANCER: ICD-10-CM

## 2023-10-13 LAB
EXPIRATION DATE: ABNORMAL
HBA1C MFR BLD: 6.6 % (ref 4.5–5.7)
Lab: ABNORMAL

## 2023-10-13 RX ORDER — RELUGOLIX 120 MG/1
120 TABLET, FILM COATED ORAL DAILY
COMMUNITY
Start: 2023-08-09

## 2023-10-13 RX ORDER — LEVOTHYROXINE SODIUM 0.15 MG/1
150 TABLET ORAL DAILY
Qty: 90 TABLET | Refills: 3 | Status: SHIPPED | OUTPATIENT
Start: 2023-10-13

## 2023-10-13 RX ORDER — DOXAZOSIN MESYLATE 4 MG/1
1 TABLET ORAL NIGHTLY
COMMUNITY

## 2023-10-13 NOTE — PROGRESS NOTES
Subjective   Vinnie Fulton is a 83 y.o. male      Chief Complaint  Diabetes mellitus  GERD  Prostate cancer  [facility] follow-up after cardioversion for atrial flutter       History of Present Illness  The patient is an 83-year-old male with known type 2 diabetes mellitus, which has been fairly well controlled. Prescription refills are indicated. Recent cardioversion without incident. An adult female accompanies him. A1c is 6.6 percent.    The adult female states that the patient's treatment is not going well. The patient took his hormone last night and it takes him a while to get over it. The patient had an atrial fibrillation problem, and they did not shock him because it had resolved. The patient was put on propafenone half a tablet 3 times a day and he gets sick. The patient has dry heaves. The patient was supposed to go back to Dr. Porras yesterday, 10/12/2023, but she canceled him. The patient is supposed to go back next Thursday, 10/19/2023. She states that they saw the other doctor at the clinic, and they did laboratory studies and said his PSA was 4.54 down from 15 when he first saw Dr. Porras. The patient was told he would be on the hormones for 2 years. The adult female states that he must take all kinds of pills every day. He was told that he could hold the hormones for a day or 2 if he is feeling bad.     The patient states he has a lot of gas and bloating. He drinks a lot of water and must urinate a lot.     The adult female states that he is feeling bad right now. He has lost weight and states nothing tastes good. He did not have his glucose level checked. The adult female states that his grandson that just turned 6 years old has insulin-dependent diabetes.     The adult female states that the patient is anemic. Dr. Sherman is going to check it again when he goes back in 12/2023.     The adult female states that he has a little scab and has 2 skin lesions. He cut himself while shaving. He does not  have a dermatologist.    The adult female states that he needs a refill on his sertraline and thyroid medication. She asks about a good over the counter medication for sinus infections.       The following portions of the patient's history were reviewed and updated as appropriate: allergies, current medications, past social history and problem list.    Review of Systems   Constitutional:  Negative for appetite change, diaphoresis, fatigue and unexpected weight change.   Eyes:  Negative for visual disturbance.   Respiratory:  Negative for cough, chest tightness and shortness of breath.    Cardiovascular:  Negative for chest pain, palpitations and leg swelling.   Gastrointestinal:  Negative for constipation, diarrhea, nausea and vomiting.   Endocrine: Negative for cold intolerance, heat intolerance, polydipsia, polyphagia and polyuria.   Skin:  Negative for color change and rash.   Neurological:  Negative for dizziness, tremors, syncope, weakness, light-headedness, numbness and headaches.   Psychiatric/Behavioral:  Negative for agitation. The patient is not nervous/anxious.          Objective         Vitals:    10/13/23 1043   BP: 130/88   Pulse: 92   Temp: 97.8 øF (36.6 øC)   SpO2: 93%         Physical Exam  Vitals and nursing note reviewed.   Constitutional:       General: He is not in acute distress.     Appearance: Normal appearance. He is well-developed. He is not ill-appearing, toxic-appearing or diaphoretic.   HENT:      Head: Normocephalic and atraumatic.   Eyes:      Comments: No exopthalmos noted   Neck:      Thyroid: No thyroid mass, thyromegaly or thyroid tenderness.      Vascular: No carotid bruit or JVD.   Cardiovascular:      Rate and Rhythm: Normal rate and regular rhythm.      Pulses: Normal pulses.      Heart sounds: Normal heart sounds. No murmur heard.  Pulmonary:      Effort: Pulmonary effort is normal. No respiratory distress.      Breath sounds: Normal breath sounds.   Abdominal:       Palpations: Abdomen is soft. There is no mass.      Tenderness: There is no abdominal tenderness.   Musculoskeletal:      Cervical back: Neck supple.   Lymphadenopathy:      Cervical: No cervical adenopathy.   Skin:     General: Skin is warm and dry.   Neurological:      Mental Status: He is alert and oriented to person, place, and time.      Sensory: No sensory deficit.      Coordination: Coordination normal.   Psychiatric:         Mood and Affect: Mood normal.         Behavior: Behavior normal.              No results were obtained or interpreted today.      Assessment & Plan     Problems Addressed this Visit          Cardiac and Vasculature    Essential hypertension, benign    Relevant Medications    doxazosin (CARDURA) 4 MG tablet       Endocrine and Metabolic    Hypothyroidism    Relevant Medications    levothyroxine (SYNTHROID, LEVOTHROID) 150 MCG tablet    Type 2 diabetes mellitus - Primary    Relevant Orders    POC Glycosylated Hemoglobin (Hb A1C) (Completed)       Gastrointestinal Abdominal     Esophageal reflux       Hematology and Neoplasia    Prostate cancer    Relevant Medications    Orgovyx 120 MG tablet tablet     Other Visit Diagnoses       Skin neoplasm        Relevant Orders    Ambulatory Referral to Dermatology (Completed)    Anxiety        Relevant Medications    sertraline (ZOLOFT) 50 MG tablet          Diagnoses         Codes Comments    Type 2 diabetes mellitus with other specified complication, without long-term current use of insulin    -  Primary ICD-10-CM: E11.69  ICD-9-CM: 250.80     Gastroesophageal reflux disease without esophagitis     ICD-10-CM: K21.9  ICD-9-CM: 530.81     Essential hypertension, benign     ICD-10-CM: I10  ICD-9-CM: 401.1     Acquired hypothyroidism     ICD-10-CM: E03.9  ICD-9-CM: 244.9     Skin neoplasm     ICD-10-CM: D49.2  ICD-9-CM: 239.2     Prostate cancer     ICD-10-CM: C61  ICD-9-CM: 185     Anxiety     ICD-10-CM: F41.9  ICD-9-CM: 300.00     Other specified  hypothyroidism     ICD-10-CM: E03.8  ICD-9-CM: 244.8           I spent 40 minutes in patient care: Reviewing records prior to the visit, examining the patient, entering orders and documentation    Part of this note may be an electronic transcription/translation of spoken language to printed text using the Dragon Dictation System.            .[email or web address]     Transcribed from ambient dictation for R Bert Yoder MD by Shira Vick.  10/13/23   13:08 EDT    Patient or patient representative verbalized consent to the visit recording.  I have personally performed the services described in this document as transcribed by the above individual, and it is both accurate and complete.

## 2024-01-11 DIAGNOSIS — K21.9 GASTROESOPHAGEAL REFLUX DISEASE WITHOUT ESOPHAGITIS: ICD-10-CM

## 2024-01-12 RX ORDER — PANTOPRAZOLE SODIUM 40 MG/1
40 TABLET, DELAYED RELEASE ORAL DAILY
Qty: 90 TABLET | Refills: 3 | Status: SHIPPED | OUTPATIENT
Start: 2024-01-12

## 2024-02-08 ENCOUNTER — TELEPHONE (OUTPATIENT)
Dept: FAMILY MEDICINE CLINIC | Facility: CLINIC | Age: 84
End: 2024-02-08

## 2024-02-08 ENCOUNTER — HOSPITAL ENCOUNTER (INPATIENT)
Facility: HOSPITAL | Age: 84
LOS: 2 days | Discharge: HOME | DRG: 291 | End: 2024-02-10
Attending: EMERGENCY MEDICINE | Admitting: INTERNAL MEDICINE
Payer: MEDICARE

## 2024-02-08 ENCOUNTER — APPOINTMENT (OUTPATIENT)
Dept: GENERAL RADIOLOGY | Facility: HOSPITAL | Age: 84
DRG: 291 | End: 2024-02-08
Payer: MEDICARE

## 2024-02-08 ENCOUNTER — APPOINTMENT (OUTPATIENT)
Dept: CT IMAGING | Facility: HOSPITAL | Age: 84
DRG: 291 | End: 2024-02-08
Payer: MEDICARE

## 2024-02-08 DIAGNOSIS — C61 PROSTATE CANCER: ICD-10-CM

## 2024-02-08 DIAGNOSIS — I16.0 HYPERTENSIVE URGENCY: ICD-10-CM

## 2024-02-08 DIAGNOSIS — R06.02 SHORTNESS OF BREATH: Primary | ICD-10-CM

## 2024-02-08 DIAGNOSIS — J90 PLEURAL EFFUSION: ICD-10-CM

## 2024-02-08 DIAGNOSIS — R18.8 OTHER ASCITES: ICD-10-CM

## 2024-02-08 DIAGNOSIS — R60.1 ANASARCA: ICD-10-CM

## 2024-02-08 LAB
ALBUMIN SERPL-MCNC: 4.5 G/DL (ref 3.5–5.2)
ALBUMIN/GLOB SERPL: 2 G/DL
ALP SERPL-CCNC: 73 U/L (ref 39–117)
ALT SERPL W P-5'-P-CCNC: 29 U/L (ref 1–41)
ANION GAP SERPL CALCULATED.3IONS-SCNC: 9 MMOL/L (ref 5–15)
AST SERPL-CCNC: 25 U/L (ref 1–40)
BASOPHILS # BLD AUTO: 0.03 10*3/MM3 (ref 0–0.2)
BASOPHILS NFR BLD AUTO: 0.7 % (ref 0–1.5)
BILIRUB SERPL-MCNC: 0.8 MG/DL (ref 0–1.2)
BUN SERPL-MCNC: 16 MG/DL (ref 8–23)
BUN/CREAT SERPL: 15.8 (ref 7–25)
CALCIUM SPEC-SCNC: 8.4 MG/DL (ref 8.6–10.5)
CHLORIDE SERPL-SCNC: 96 MMOL/L (ref 98–107)
CO2 SERPL-SCNC: 30 MMOL/L (ref 22–29)
CREAT SERPL-MCNC: 1.01 MG/DL (ref 0.76–1.27)
DEPRECATED RDW RBC AUTO: 52.3 FL (ref 37–54)
EGFRCR SERPLBLD CKD-EPI 2021: 73.8 ML/MIN/1.73
EOSINOPHIL # BLD AUTO: 0.08 10*3/MM3 (ref 0–0.4)
EOSINOPHIL NFR BLD AUTO: 1.7 % (ref 0.3–6.2)
ERYTHROCYTE [DISTWIDTH] IN BLOOD BY AUTOMATED COUNT: 16 % (ref 12.3–15.4)
GEN 5 2HR TROPONIN T REFLEX: 26 NG/L
GLOBULIN UR ELPH-MCNC: 2.3 GM/DL
GLUCOSE BLDC GLUCOMTR-MCNC: 103 MG/DL (ref 70–130)
GLUCOSE SERPL-MCNC: 136 MG/DL (ref 65–99)
HCT VFR BLD AUTO: 37.1 % (ref 37.5–51)
HGB BLD-MCNC: 12 G/DL (ref 13–17.7)
HOLD SPECIMEN: NORMAL
IMM GRANULOCYTES # BLD AUTO: 0.01 10*3/MM3 (ref 0–0.05)
IMM GRANULOCYTES NFR BLD AUTO: 0.2 % (ref 0–0.5)
LYMPHOCYTES # BLD AUTO: 0.36 10*3/MM3 (ref 0.7–3.1)
LYMPHOCYTES NFR BLD AUTO: 7.8 % (ref 19.6–45.3)
MCH RBC QN AUTO: 28.8 PG (ref 26.6–33)
MCHC RBC AUTO-ENTMCNC: 32.3 G/DL (ref 31.5–35.7)
MCV RBC AUTO: 89.2 FL (ref 79–97)
MONOCYTES # BLD AUTO: 0.25 10*3/MM3 (ref 0.1–0.9)
MONOCYTES NFR BLD AUTO: 5.4 % (ref 5–12)
NEUTROPHILS NFR BLD AUTO: 3.88 10*3/MM3 (ref 1.7–7)
NEUTROPHILS NFR BLD AUTO: 84.2 % (ref 42.7–76)
NRBC BLD AUTO-RTO: 0 /100 WBC (ref 0–0.2)
NT-PROBNP SERPL-MCNC: 3498 PG/ML (ref 0–1800)
PLATELET # BLD AUTO: 106 10*3/MM3 (ref 140–450)
PMV BLD AUTO: 10.6 FL (ref 6–12)
POTASSIUM SERPL-SCNC: 4.3 MMOL/L (ref 3.5–5.2)
PROT SERPL-MCNC: 6.8 G/DL (ref 6–8.5)
QT INTERVAL: 430 MS
QTC INTERVAL: 486 MS
RBC # BLD AUTO: 4.16 10*6/MM3 (ref 4.14–5.8)
SODIUM SERPL-SCNC: 135 MMOL/L (ref 136–145)
TROPONIN T DELTA: 0 NG/L
TROPONIN T SERPL HS-MCNC: 22 NG/L
TROPONIN T SERPL HS-MCNC: 26 NG/L
WBC NRBC COR # BLD AUTO: 4.61 10*3/MM3 (ref 3.4–10.8)
WHOLE BLOOD HOLD COAG: NORMAL
WHOLE BLOOD HOLD SPECIMEN: NORMAL

## 2024-02-08 PROCEDURE — 93005 ELECTROCARDIOGRAM TRACING: CPT

## 2024-02-08 PROCEDURE — 93005 ELECTROCARDIOGRAM TRACING: CPT | Performed by: EMERGENCY MEDICINE

## 2024-02-08 PROCEDURE — 80053 COMPREHEN METABOLIC PANEL: CPT | Performed by: EMERGENCY MEDICINE

## 2024-02-08 PROCEDURE — 99222 1ST HOSP IP/OBS MODERATE 55: CPT | Performed by: STUDENT IN AN ORGANIZED HEALTH CARE EDUCATION/TRAINING PROGRAM

## 2024-02-08 PROCEDURE — 82948 REAGENT STRIP/BLOOD GLUCOSE: CPT

## 2024-02-08 PROCEDURE — 71045 X-RAY EXAM CHEST 1 VIEW: CPT

## 2024-02-08 PROCEDURE — 71250 CT THORAX DX C-: CPT

## 2024-02-08 PROCEDURE — 85025 COMPLETE CBC W/AUTO DIFF WBC: CPT | Performed by: EMERGENCY MEDICINE

## 2024-02-08 PROCEDURE — 36415 COLL VENOUS BLD VENIPUNCTURE: CPT

## 2024-02-08 PROCEDURE — B24BZZ4 ULTRASONOGRAPHY OF HEART WITH AORTA, TRANSESOPHAGEAL: ICD-10-PCS | Performed by: INTERNAL MEDICINE

## 2024-02-08 PROCEDURE — 25010000002 FUROSEMIDE PER 20 MG: Performed by: EMERGENCY MEDICINE

## 2024-02-08 PROCEDURE — 84484 ASSAY OF TROPONIN QUANT: CPT | Performed by: STUDENT IN AN ORGANIZED HEALTH CARE EDUCATION/TRAINING PROGRAM

## 2024-02-08 PROCEDURE — 84484 ASSAY OF TROPONIN QUANT: CPT | Performed by: EMERGENCY MEDICINE

## 2024-02-08 PROCEDURE — 99285 EMERGENCY DEPT VISIT HI MDM: CPT

## 2024-02-08 PROCEDURE — 25010000002 NITROGLYCERIN 200 MCG/ML SOLUTION: Performed by: EMERGENCY MEDICINE

## 2024-02-08 PROCEDURE — 83880 ASSAY OF NATRIURETIC PEPTIDE: CPT | Performed by: EMERGENCY MEDICINE

## 2024-02-08 RX ORDER — DEXTROSE MONOHYDRATE 25 G/50ML
25 INJECTION, SOLUTION INTRAVENOUS
Status: DISCONTINUED | OUTPATIENT
Start: 2024-02-08 | End: 2024-02-10 | Stop reason: HOSPADM

## 2024-02-08 RX ORDER — LOSARTAN POTASSIUM 50 MG/1
100 TABLET ORAL DAILY
Status: DISCONTINUED | OUTPATIENT
Start: 2024-02-09 | End: 2024-02-09

## 2024-02-08 RX ORDER — METOPROLOL TARTRATE 50 MG/1
100 TABLET, FILM COATED ORAL 2 TIMES DAILY
Status: DISCONTINUED | OUTPATIENT
Start: 2024-02-08 | End: 2024-02-09

## 2024-02-08 RX ORDER — BISACODYL 5 MG/1
5 TABLET, DELAYED RELEASE ORAL DAILY PRN
Status: DISCONTINUED | OUTPATIENT
Start: 2024-02-08 | End: 2024-02-10 | Stop reason: HOSPADM

## 2024-02-08 RX ORDER — TERAZOSIN 1 MG/1
1 CAPSULE ORAL NIGHTLY
Status: DISCONTINUED | OUTPATIENT
Start: 2024-02-08 | End: 2024-02-10 | Stop reason: HOSPADM

## 2024-02-08 RX ORDER — POLYETHYLENE GLYCOL 3350 17 G/17G
17 POWDER, FOR SOLUTION ORAL DAILY PRN
Status: DISCONTINUED | OUTPATIENT
Start: 2024-02-08 | End: 2024-02-10 | Stop reason: HOSPADM

## 2024-02-08 RX ORDER — MECLIZINE HYDROCHLORIDE 25 MG/1
25 TABLET ORAL 3 TIMES DAILY PRN
Status: DISCONTINUED | OUTPATIENT
Start: 2024-02-08 | End: 2024-02-10 | Stop reason: HOSPADM

## 2024-02-08 RX ORDER — METOPROLOL SUCCINATE 25 MG/1
25 TABLET, EXTENDED RELEASE ORAL DAILY
COMMUNITY

## 2024-02-08 RX ORDER — ROSUVASTATIN CALCIUM 20 MG/1
20 TABLET, COATED ORAL NIGHTLY
Status: DISCONTINUED | OUTPATIENT
Start: 2024-02-08 | End: 2024-02-10 | Stop reason: HOSPADM

## 2024-02-08 RX ORDER — FUROSEMIDE 10 MG/ML
80 INJECTION INTRAMUSCULAR; INTRAVENOUS ONCE
Status: COMPLETED | OUTPATIENT
Start: 2024-02-08 | End: 2024-02-08

## 2024-02-08 RX ORDER — INSULIN LISPRO 100 [IU]/ML
2-7 INJECTION, SOLUTION INTRAVENOUS; SUBCUTANEOUS
Status: DISCONTINUED | OUTPATIENT
Start: 2024-02-08 | End: 2024-02-09

## 2024-02-08 RX ORDER — PANTOPRAZOLE SODIUM 40 MG/1
40 TABLET, DELAYED RELEASE ORAL
Status: DISCONTINUED | OUTPATIENT
Start: 2024-02-09 | End: 2024-02-10 | Stop reason: HOSPADM

## 2024-02-08 RX ORDER — FLECAINIDE ACETATE 50 MG/1
50 TABLET ORAL 2 TIMES DAILY
COMMUNITY

## 2024-02-08 RX ORDER — ASPIRIN 81 MG/1
81 TABLET ORAL DAILY
Status: DISCONTINUED | OUTPATIENT
Start: 2024-02-09 | End: 2024-02-10 | Stop reason: HOSPADM

## 2024-02-08 RX ORDER — NICOTINE POLACRILEX 4 MG
15 LOZENGE BUCCAL
Status: DISCONTINUED | OUTPATIENT
Start: 2024-02-08 | End: 2024-02-10 | Stop reason: HOSPADM

## 2024-02-08 RX ORDER — SODIUM CHLORIDE 0.9 % (FLUSH) 0.9 %
10 SYRINGE (ML) INJECTION AS NEEDED
Status: DISCONTINUED | OUTPATIENT
Start: 2024-02-08 | End: 2024-02-10 | Stop reason: HOSPADM

## 2024-02-08 RX ORDER — BISACODYL 10 MG
10 SUPPOSITORY, RECTAL RECTAL DAILY PRN
Status: DISCONTINUED | OUTPATIENT
Start: 2024-02-08 | End: 2024-02-10 | Stop reason: HOSPADM

## 2024-02-08 RX ORDER — SPIRONOLACTONE 25 MG/1
25 TABLET ORAL DAILY
Status: DISCONTINUED | OUTPATIENT
Start: 2024-02-09 | End: 2024-02-10 | Stop reason: HOSPADM

## 2024-02-08 RX ORDER — FUROSEMIDE 10 MG/ML
40 INJECTION INTRAMUSCULAR; INTRAVENOUS DAILY
Status: DISCONTINUED | OUTPATIENT
Start: 2024-02-09 | End: 2024-02-09

## 2024-02-08 RX ORDER — SODIUM CHLORIDE 0.9 % (FLUSH) 0.9 %
10 SYRINGE (ML) INJECTION EVERY 12 HOURS SCHEDULED
Status: DISCONTINUED | OUTPATIENT
Start: 2024-02-08 | End: 2024-02-10 | Stop reason: HOSPADM

## 2024-02-08 RX ORDER — IBUPROFEN 600 MG/1
1 TABLET ORAL
Status: DISCONTINUED | OUTPATIENT
Start: 2024-02-08 | End: 2024-02-10 | Stop reason: HOSPADM

## 2024-02-08 RX ORDER — LEVOTHYROXINE SODIUM 0.15 MG/1
150 TABLET ORAL
Status: DISCONTINUED | OUTPATIENT
Start: 2024-02-09 | End: 2024-02-10 | Stop reason: HOSPADM

## 2024-02-08 RX ORDER — AMOXICILLIN 250 MG
2 CAPSULE ORAL 2 TIMES DAILY PRN
Status: DISCONTINUED | OUTPATIENT
Start: 2024-02-08 | End: 2024-02-10 | Stop reason: HOSPADM

## 2024-02-08 RX ORDER — SODIUM CHLORIDE 9 MG/ML
40 INJECTION, SOLUTION INTRAVENOUS AS NEEDED
Status: DISCONTINUED | OUTPATIENT
Start: 2024-02-08 | End: 2024-02-10 | Stop reason: HOSPADM

## 2024-02-08 RX ORDER — NITROGLYCERIN 20 MG/100ML
5-200 INJECTION INTRAVENOUS
Status: DISCONTINUED | OUTPATIENT
Start: 2024-02-08 | End: 2024-02-09

## 2024-02-08 RX ADMIN — Medication 10 ML: at 21:10

## 2024-02-08 RX ADMIN — NITROGLYCERIN 5 MCG/MIN: 20 INJECTION INTRAVENOUS at 16:25

## 2024-02-08 RX ADMIN — TERAZOSIN HYDROCHLORIDE 1 MG: 1 CAPSULE ORAL at 21:09

## 2024-02-08 RX ADMIN — FUROSEMIDE 80 MG: 10 INJECTION, SOLUTION INTRAMUSCULAR; INTRAVENOUS at 16:16

## 2024-02-08 RX ADMIN — NITROGLYCERIN 150 MCG/MIN: 20 INJECTION INTRAVENOUS at 20:44

## 2024-02-08 RX ADMIN — ROSUVASTATIN CALCIUM 20 MG: 20 TABLET, FILM COATED ORAL at 21:09

## 2024-02-08 RX ADMIN — RIVAROXABAN 20 MG: 20 TABLET, FILM COATED ORAL at 21:09

## 2024-02-08 RX ADMIN — METOPROLOL TARTRATE 100 MG: 50 TABLET ORAL at 21:09

## 2024-02-08 RX ADMIN — NITROGLYCERIN 50 MCG/MIN: 20 INJECTION INTRAVENOUS at 21:13

## 2024-02-08 NOTE — ED PROVIDER NOTES
Subjective   History of Present Illness  Patient is a pleasant 83-year-old male who presents to the emergency department with swelling of the lower extremities, abdomen, and increasing shortness of breath.  He has a history of prostate cancer currently managed at the [facility] [place].  Had a CT scan of his abdomen pelvis performed this morning as part of his prostate cancer treatments.  He was noted to have abdominal ascites, edema of the soft tissues through the pelvis and also bilateral pleural effusions noted on this CT scan.  I reviewed the results which is available in care everywhere of epic.  Images are not available to see.  Family states over the past 2 to 3 weeks he has had progressively worsening swelling of his lower extremities and abdomen is now gotten to the point he is no longer able to lie down flat when he sleeps.  He sleeps in recliner or on several pillows.  He has no history of CHF with last echocardiogram performed in 2020.  At that time it was normal.  Denies fever, chills, chest pain, abdominal pain, vomiting, diarrhea, or other other acute complaints.  He states he is no longer able to go outside and walk due to dyspnea with exertion.      Review of Systems   All other systems reviewed and are negative.      Past Medical History:   Diagnosis Date    Fistula-in-ano     GERD (gastroesophageal reflux disease)     History of radiation therapy     Hyperlipidemia     Hypertension     Hyperthyroidism     Perirectal abscess     Prostate cancer        Allergies   Allergen Reactions    Atorvastatin Myalgia    Lisinopril-Hydrochlorothiazide GI Intolerance    Lortab [Hydrocodone-Acetaminophen]     Tetanus Toxoid, Adsorbed Swelling    Tetanus Toxoids        Past Surgical History:   Procedure Laterality Date    CARPAL TUNNEL RELEASE         Family History   Problem Relation Age of Onset    Heart disease Mother     Diabetes Mother     Heart disease Father     Diabetes Father     Cancer Father         Social History     Socioeconomic History    Marital status:    Tobacco Use    Smoking status: Former     Packs/day: 1.00     Years: 1.00     Additional pack years: 0.00     Total pack years: 1.00     Types: Cigarettes     Quit date: 2000     Years since quittin.1    Smokeless tobacco: Former   Vaping Use    Vaping Use: Never used   Substance and Sexual Activity    Alcohol use: No    Drug use: No    Sexual activity: Not Currently           Objective   Physical Exam  Vitals and nursing note reviewed.   Constitutional:       General: He is not in acute distress.     Appearance: He is well-developed.   HENT:      Head: Normocephalic and atraumatic.   Eyes:      Pupils: Pupils are equal, round, and reactive to light.   Neck:      Thyroid: No thyromegaly.      Vascular: No JVD.   Cardiovascular:      Rate and Rhythm: Normal rate and regular rhythm.      Heart sounds: Normal heart sounds. No murmur heard.     No friction rub. No gallop.   Pulmonary:      Effort: Pulmonary effort is normal. No respiratory distress.      Breath sounds: Decreased breath sounds (Bilaterally) present. No wheezing or rales.   Chest:      Chest wall: Edema present. No tenderness.   Abdominal:      Palpations: Abdomen is soft.      Tenderness: There is no abdominal tenderness.      Comments: Edema throughout the soft tissues of the bilateral lower extremities and pelvis/abdomen   Musculoskeletal:         General: Normal range of motion.      Cervical back: Normal range of motion and neck supple.      Right lower leg: Edema present.      Left lower leg: Edema present.   Lymphadenopathy:      Cervical: No cervical adenopathy.   Skin:     General: Skin is warm and dry.      Capillary Refill: Capillary refill takes less than 2 seconds.   Neurological:      General: No focal deficit present.      Mental Status: He is alert and oriented to person, place, and time.   Psychiatric:         Behavior: Behavior normal.          Procedures           ED Course      Recent Results (from the past 24 hour(s))   ECG 12 Lead ED Triage Standing Order; SOA    Collection Time: 02/08/24 11:14 AM   Result Value Ref Range    QT Interval 430 ms    QTC Interval 486 ms   BNP    Collection Time: 02/08/24 11:41 AM    Specimen: Blood   Result Value Ref Range    proBNP 3,498.0 (H) 0.0 - 1,800.0 pg/mL   Single High Sensitivity Troponin T    Collection Time: 02/08/24 11:41 AM    Specimen: Blood   Result Value Ref Range    HS Troponin T 22 (H) <22 ng/L   CBC Auto Differential    Collection Time: 02/08/24 11:41 AM    Specimen: Blood   Result Value Ref Range    WBC 4.61 3.40 - 10.80 10*3/mm3    RBC 4.16 4.14 - 5.80 10*6/mm3    Hemoglobin 12.0 (L) 13.0 - 17.7 g/dL    Hematocrit 37.1 (L) 37.5 - 51.0 %    MCV 89.2 79.0 - 97.0 fL    MCH 28.8 26.6 - 33.0 pg    MCHC 32.3 31.5 - 35.7 g/dL    RDW 16.0 (H) 12.3 - 15.4 %    RDW-SD 52.3 37.0 - 54.0 fl    MPV 10.6 6.0 - 12.0 fL    Platelets 106 (L) 140 - 450 10*3/mm3    Neutrophil % 84.2 (H) 42.7 - 76.0 %    Lymphocyte % 7.8 (L) 19.6 - 45.3 %    Monocyte % 5.4 5.0 - 12.0 %    Eosinophil % 1.7 0.3 - 6.2 %    Basophil % 0.7 0.0 - 1.5 %    Immature Grans % 0.2 0.0 - 0.5 %    Neutrophils, Absolute 3.88 1.70 - 7.00 10*3/mm3    Lymphocytes, Absolute 0.36 (L) 0.70 - 3.10 10*3/mm3    Monocytes, Absolute 0.25 0.10 - 0.90 10*3/mm3    Eosinophils, Absolute 0.08 0.00 - 0.40 10*3/mm3    Basophils, Absolute 0.03 0.00 - 0.20 10*3/mm3    Immature Grans, Absolute 0.01 0.00 - 0.05 10*3/mm3    nRBC 0.0 0.0 - 0.2 /100 WBC     Note: In addition to lab results from this visit, the labs listed above may include labs taken at another facility or during a different encounter within the last 24 hours. Please correlate lab times with ED admission and discharge times for further clarification of the services performed during this visit.    XR Chest 1 View   Final Result   Impression:   No acute cardiopulmonary findings.         Electronically  "Signed: Alfonzo Arreola MD     2/8/2024 12:13 PM EST     Workstation ID: [id number]        Vitals:    02/08/24 1109 02/08/24 1159   BP: (!) 163/117 (!) 170/109   BP Location: Left arm    Patient Position: Sitting    Pulse: 82 73   Resp: 18    Temp: 97.4 °F (36.3 °C)    TempSrc: Oral    SpO2: 93% 95%   Weight: 102 kg (225 lb)    Height: 180.3 cm (71\")      Medications   sodium chloride 0.9 % flush 10 mL (has no administration in time range)     ECG/EMG Results (last 24 hours)       Procedure Component Value Units Date/Time    ECG 12 Lead ED Triage Standing Order; SOA [[id number]] Collected: 02/08/24 1114     Updated: 02/08/24 1224     QT Interval 430 ms      QTC Interval 486 ms     Narrative:      Test Reason : ED Triage Standing Order~  Blood Pressure :   */*   mmHG  Vent. Rate :  77 BPM     Atrial Rate :  77 BPM     P-R Int : 328 ms          QRS Dur : 102 ms      QT Int : 430 ms       P-R-T Axes :  90 -17 -46 degrees     QTc Int : 486 ms    Sinus rhythm with 1st degree AV block with premature atrial complexes with aberrant conduction  Prolonged QT  Abnormal ECG  When compared with ECG of 28-SEP-2023 09:22,  Nonspecific T wave abnormality no longer evident in Lateral leads    Referred By:            Confirmed By:           ECG 12 Lead ED Triage Standing Order; SOA   Preliminary Result   Test Reason : ED Triage Standing Order~   Blood Pressure :   */*   mmHG   Vent. Rate :  77 BPM     Atrial Rate :  77 BPM      P-R Int : 328 ms          QRS Dur : 102 ms       QT Int : 430 ms       P-R-T Axes :  90 -17 -46 degrees      QTc Int : 486 ms      Sinus rhythm with 1st degree AV block with premature atrial complexes with    aberrant conduction   Prolonged QT   Abnormal ECG   When compared with ECG of 28-SEP-2023 09:22,   Nonspecific T wave abnormality no longer evident in Lateral leads      Referred By:            Confirmed By:                                                    Medical Decision Making  Problems " Addressed:  Anasarca: complicated acute illness or injury  Hypertensive urgency: complicated acute illness or injury  Other ascites: complicated acute illness or injury  Pleural effusion: complicated acute illness or injury  Prostate cancer: complicated acute illness or injury  Shortness of breath: complicated acute illness or injury    Amount and/or Complexity of Data Reviewed  External Data Reviewed: notes.  Labs: ordered. Decision-making details documented in ED Course.  Radiology: ordered and independent interpretation performed. Decision-making details documented in ED Course.  ECG/medicine tests: ordered and independent interpretation performed. Decision-making details documented in ED Course.    Risk  Prescription drug management.  Decision regarding hospitalization.        Final diagnoses:   Shortness of breath   Anasarca   Hypertensive urgency   Other ascites   Prostate cancer   Pleural effusion       ED Disposition  ED Disposition       ED Disposition   Decision to Admit    Condition   --    Comment   Level of Care: Telemetry [5]   Diagnosis: Anasarca [137274]   Admitting Physician: EDGAR MATHEW [[id number]]   Certification: I Certify That Inpatient Hospital Services Are Medically Necessary For Greater Than 2 Midnights                 No follow-up provider specified.       Medication List      No changes were made to your prescriptions during this visit.            Saul Duarte DO  02/11/24 [id number]

## 2024-02-08 NOTE — H&P
[facility] Medicine Services  HISTORY AND PHYSICAL    Patient Name: Vinnie Fulton  : 1940  MRN: [id number]  Primary Care Physician: Ivan Yoder MD  Date of admission: 2024      Subjective   Subjective     Chief Complaint:  Anasarca, dyspnea    HPI:  Vinnie Fulton is a 83 y.o. male with history of hypertension, A-fib on anticoagulation, prostate cancer currently undergoing treatment outpatient who is presenting with diffuse anasarca and increasing dyspnea on exertion and orthopnea.  He states that his symptoms have been progressive for the past couple weeks.  His swelling is diffuse, he most bothersome in his penile area but also noted diffusely over his lower extremities.  He also endorses abdominal fullness and associated lack of appetite.  He is reporting needing to use a couple pillows at night to sleep without shortness of breath.  He is found that he is having increasing worsening dyspnea on exertion.  He follows with Dr. Diaz outpatient for A-fib and hypertension.      Personal History     Past Medical History:   Diagnosis Date    Fistula-in-ano     GERD (gastroesophageal reflux disease)     History of radiation therapy     Hyperlipidemia     Hypertension     Hyperthyroidism     Perirectal abscess     Prostate cancer          Oncology Problem List:  Prostate cancer (10/29/2021; Status: Active)    Oncology/Hematology History     Past Surgical History:   Procedure Laterality Date    CARPAL TUNNEL RELEASE         Family History: family history includes Cancer in his father; Diabetes in his father and mother; Heart disease in his father and mother.     Social History:  reports that he quit smoking about 24 years ago. His smoking use included cigarettes. He has a 1.00 pack-year smoking history. He has quit using smokeless tobacco. He reports that he does not drink alcohol and does not use drugs.  Social History     Social History Narrative    Not on file        Medications:  Available home medication information reviewed.  aspirin, doxazosin, levothyroxine, losartan, meclizine, metFORMIN ER, metoprolol tartrate, pantoprazole, propafenone, relugolix, rivaroxaban, rosuvastatin, sertraline, and spironolactone    Allergies   Allergen Reactions    Atorvastatin Myalgia    Lisinopril-Hydrochlorothiazide GI Intolerance    Lortab [Hydrocodone-Acetaminophen]     Tetanus Toxoid, Adsorbed Swelling    Tetanus Toxoids        Objective   Objective     Vital Signs:   Temp:  [97.4 °F (36.3 °C)] 97.4 °F (36.3 °C)  Heart Rate:  [71-85] 85  Resp:  [18-20] 20  BP: (163-190)/(109-135) 171/114       Physical Exam   Constitutional: No acute distress, awake, alert  HENT: NCAT, mucous membranes moist  Respiratory: Clear to auscultation bilaterally with diminished bibasilar sounds, respiratory effort fair on room air.  His oxygen saturations did drop with sitting up but quickly recovered  Cardiovascular: RRR, no murmurs, rubs, or gallops  Gastrointestinal: Positive bowel sounds, soft, nontender, nondistended  Musculoskeletal: 1+ pitting bilateral ankle edema extending past knees  Psychiatric: Appropriate affect, cooperative  Neurologic: Oriented x 3, strength symmetric, speech clear, no focal deficits  Skin: No rashes      Result Review:  I have personally reviewed the results from the time of this admission to 2/8/2024 17:12 EST and agree with these findings:  [x]  Laboratory list / accordion  [x]  Microbiology  [x]  Radiology  [x]  EKG/Telemetry   [x]  Cardiology/Vascular   []  Pathology  []  Old records  []  Other:  Most notable findings include:   Elevated proBNP  Cr wnl  Imaging shows small R and trace L pleural effusions  Ascites, edema seen in abd and subcutaneous tissues of pelvis    LAB RESULTS:      Lab 02/08/24  1141   WBC 4.61   HEMOGLOBIN 12.0*   HEMATOCRIT 37.1*   PLATELETS 106*   NEUTROS ABS 3.88   IMMATURE GRANS (ABS) 0.01   LYMPHS ABS 0.36*   MONOS ABS 0.25   EOS ABS 0.08    MCV 89.2         Lab 02/08/24  1141   SODIUM 135*   POTASSIUM 4.3   CHLORIDE 96*   CO2 30.0*   ANION GAP 9.0   BUN 16   CREATININE 1.01   EGFR 73.8   GLUCOSE 136*   CALCIUM 8.4*         Lab 02/08/24  1141   TOTAL PROTEIN 6.8   ALBUMIN 4.5   GLOBULIN 2.3   ALT (SGPT) 29   AST (SGOT) 25   BILIRUBIN 0.8   ALK PHOS 73         Lab 02/08/24  1141   PROBNP 3,498.0*   HSTROP T 22*                 UA          3/17/2023    22:57   Urinalysis   Specific [place], UA 1.033    Ketones, UA Negative    Blood, UA Negative    Leukocytes, UA Negative    Nitrite, UA Negative        Microbiology Results (last 10 days)       ** No results found for the last 240 hours. **            CT Chest Without Contrast Diagnostic    Result Date: 2/8/2024  CT CHEST WO CONTRAST DIAGNOSTIC Date of Exam: 2/8/2024 3:44 PM EST Indication: Shortness of breath.  Patient already received contrast load for a CT scan of his abdomen at outside facility this morning.. Comparison: Plain film obtained on the same date Technique: Axial CT images were obtained of the chest without contrast administration.  Reconstructed coronal and sagittal images were also obtained. Automated exposure control and iterative construction methods were used. The subglottic airway is clear. Small right and trace left pleural effusions. Bilateral lobe calcified granulomata. No pneumothorax. Calcified mediastinal lymph nodes consistent with prior bouts of pancreatitis. Left lower lobe calcified granulomata. The  thyroid gland appears atrophic. No adenopathy within the chest. Enlargement of the main pulmonary artery which measures 3.9 cm suggesting pulmonary arterial hypertension. Atheromatous disease of the coronary vessels. Small volume perihepatic ascites. Colonic diverticulosis. Excretion of IV contrast in the kidneys. Otherwise the visualized upper abdomen is normal. Degenerative changes of the thoracic spine.     Impression: Small right and trace left pleural effusions with  evidence of prior granulomatous disease. Electronically Signed: Jude Nagy MD  2024 3:52 PM EST  Workstation ID: [id number]    XR Chest 1 View    Result Date: 2024  XR CHEST 1 VW Date of Exam: 2024 11:40 AM EST Indication: SOA triage protocol Comparison: Chest x-ray 10/29/2021 Findings: Mild cardiomegaly. The lungs are grossly clear without focal consolidation. No pleural effusion or pneumothorax.     Impression: Impression: No acute cardiopulmonary findings. Electronically Signed: Alfonzo Arreola MD  2024 12:13 PM EST  Workstation ID: [id number]    CT Abdomen Pelvis With Contrast    Result Date: 2024  [place] [address] Patient Name: JUDE RICHARD Patient : 1940 Patient MRN: [id number] Ordering Provider: HARRY GLEASON EXAM DATE: 2024 EXAM: CT A/P WITH CONTRAST CLINICAL INFORMATION: Prostate cancer follow-up. TECHNIQUE: No POC testing for eGFR was performed due to absence of risk factors. Multiple axial CT images of the abdomen and pelvis were obtained after injection of 100 mL Omnipaque 350 (1 x 100 mL bottle of NDC [address]-80). None was wasted and discarded. Bowel was opacified with oral contrast. COMPARISON: PSMA PET CT 2023 FINDINGS ON CT ABDOMEN: LOWER THORAX: Chronic bronchiectatic/fibrotic changes are seen in both lung bases. There is a moderate-sized right pleural effusion covering approximately 10% of the right hemithorax. There is a small left pleural effusion. No obvious cardiac abnormality.       UPPER ABDOMINAL ORGANS: Liver, gallbladder, spleen, pancreas, adrenals and both kidneys are normal.   BOWEL AND MESENTERY: Stomach, small bowel and colon are normal. Mild ascites is seen. A few small scattered subcentimeter mesenteric lymph nodes are seen. Not significant by size criteria. RETROPERITONEUM: Aorta shows atherosclerotic calcifications with normal aortic caliber. IVC and branches are patent and normal. No  retroperitoneal lymphadenopathy. BODY WALL AND MUSCULOSKELETAL STRUCTURES: Degenerative changes of the lumbar spine are noted. Anterior abdominal wall is normal. FINDINGS ON CT PELVIS: PELVIC CAVITY: Urinary bladder and rectosigmoid are normal. Prostate gland is mildly enlarged. Fiducial markers are seen in the prostate gland. Small amount of fluid is seen in the pelvis. Previously seen lymph nodes have decreased in size now measuring 2-6 mm. There are seen in image 274, 504 and 529 of series 202. No new lymphadenopathy is seen. MUSCULOSKELETAL STRUCTURES: Markedly abnormal bone texture is seen in the sacrum and right ilium. It was present in the previous examination also and did not show PSMA uptake. 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 Edema is seen in the subcutaneous tissues of the pelvis. COMBINED IMPRESSION: 1. Decrease in the size of the previously seen pelvic lymph nodes. 2. Development of new bilateral pleural effusions and ascites. Cause and clinical significance are not clear. 3. Development of edema in the subcutaneous tissues of the pelvis. 4. Abnormal bone texture of sacrum and right ilium. It did not show PSMA uptake in the past. It is stable. 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 Interpreted By: Jourdan Fatima MD Electronically Signed By: Jourdan Fatima MD on 2/8/2024 9:47 AM     Results for orders placed during the hospital encounter of 10/29/21    Adult Transthoracic Echo Complete W/ Cont if Necessary Per Protocol (With Agitated Saline)    Interpretation Summary  · Estimated left ventricular EF = 60%  · The cardiac valves are anatomically and functionally normal.  · Normal agitated saline bubble study      Assessment & Plan   Assessment & Plan       Anasarca    Essential hypertension, benign    Hypothyroidism    Hypertensive emergency    Prostate cancer    Type 2 diabetes mellitus        Vinnie Fulton is an 84 y/o M w hx of afib on anticoag, HTN, prostate cancer followed  outpatient by oncology presenting w dyspnea on exertion and diffuse anasarca with concern for HF exacerbation    Dyspnea  Diffuse anasarca  Suspected HF exacerbation  --cont diuresis, strict I/O, daily weights  --echo   --no hx of CKD, albumin OK    HTN urgency  --started on nitro drip in ED, cont  --known to cardiology, will consult   --trend troponin  --restart home antihypertensives    Afib  --cont rate control w metoprolol and xarelto    Prostate cancer    Hypothyroid    Hx of CVA-- on ASA, statin    DM  --SSI           DVT prophylaxis:             CODE STATUS:    Code Status and Medical Interventions:   Ordered at: 02/08/24 1706     Level Of Support Discussed With:    Patient     Code Status (Patient has no pulse and is not breathing):    CPR (Attempt to Resuscitate)     Medical Interventions (Patient has pulse or is breathing):    Full Support       Expected Discharge   Expected discharge date/ time has not been documented.     Taya Chaudhary MD  02/08/24

## 2024-02-08 NOTE — ED NOTES
Vinnie Fulton    Nursing Report ED to Floor:  Mental status: A&0X4  Ambulatory status: UP AT [facility]  Oxygen Therapy:  RA  Cardiac Rhythm: NS  Admitted from: HOME  Safety Concerns:  NO  Social Issues: NO  ED Room #:  10    ED Nurse Phone Extension - [phone] or may call [phone].      HPI:   Chief Complaint   Patient presents with    Groin Swelling    Shortness of Breath       Past Medical History:  Past Medical History:   Diagnosis Date    Fistula-in-ano     GERD (gastroesophageal reflux disease)     History of radiation therapy     Hyperlipidemia     Hypertension     Hyperthyroidism     Perirectal abscess     Prostate cancer         Past Surgical History:  Past Surgical History:   Procedure Laterality Date    CARPAL TUNNEL RELEASE          Admitting Doctor:   No admitting provider for patient encounter.    Consulting Provider(s):  Consults       No orders found from 1/10/2024 to 2/9/2024.             Admitting Diagnosis:   The primary encounter diagnosis was Shortness of breath. Diagnoses of Anasarca, Hypertensive urgency, Other ascites, Prostate cancer, and Pleural effusion were also pertinent to this visit.    Most Recent Vitals:   Vitals:    02/08/24 1402 02/08/24 1430 02/08/24 1432 02/08/24 1502   BP: (!) 190/135  (!) 181/130 (!) 178/123   BP Location:       Patient Position:       Pulse: 73  76 74   Resp:  20     Temp:       TempSrc:       SpO2: 95% 92% 96% 95%   Weight:       Height:           Active LDAs/IV Access:   Lines, Drains & Airways       Active LDAs       None                    Labs (abnormal labs have a star):   Labs Reviewed   COMPREHENSIVE METABOLIC PANEL - Abnormal; Notable for the following components:       Result Value    Glucose 136 (*)     Sodium 135 (*)     Chloride 96 (*)     CO2 30.0 (*)     Calcium 8.4 (*)     All other components within normal limits    Narrative:     GFR Normal >60  Chronic Kidney Disease <60  Kidney Failure <15    The GFR formula is only valid for adults with stable renal  function between ages 18 and 70.   BNP (IN-HOUSE) - Abnormal; Notable for the following components:    proBNP 3,498.0 (*)     All other components within normal limits    Narrative:     This assay is used as an aid in the diagnosis of individuals suspected of having heart failure. It can be used as an aid in the diagnosis of acute decompensated heart failure (ADHF) in patients presenting with signs and symptoms of ADHF to the emergency department (ED). In addition, NT-proBNP of <300 pg/mL indicates ADHF is not likely.    Age Range Result Interpretation  NT-proBNP Concentration (pg/mL:      <50             Positive            >450                   Gray                 300-450                    Negative             <300    50-75           Positive            >900                  Gray                300-900                  Negative            <300      >75             Positive            >1800                  Gray                300-1800                  Negative            <300   SINGLE HSTROPONIN T - Abnormal; Notable for the following components:    HS Troponin T 22 (*)     All other components within normal limits    Narrative:     High Sensitive Troponin T Reference Range:  <14.0 ng/L- Negative Female for AMI  <22.0 ng/L- Negative Male for AMI  >=14 - Abnormal Female indicating possible myocardial injury.  >=22 - Abnormal Male indicating possible myocardial injury.   Clinicians would have to utilize clinical acumen, EKG, Troponin, and serial changes to determine if it is an Acute Myocardial Infarction or myocardial injury due to an underlying chronic condition.        CBC WITH AUTO DIFFERENTIAL - Abnormal; Notable for the following components:    Hemoglobin 12.0 (*)     Hematocrit 37.1 (*)     RDW 16.0 (*)     Platelets 106 (*)     Neutrophil % 84.2 (*)     Lymphocyte % 7.8 (*)     Lymphocytes, Absolute 0.36 (*)     All other components within normal limits   RAINBOW DRAW    Narrative:     The following  orders were created for panel order [place] Draw.  Procedure                               Abnormality         Status                     ---------                               -----------         ------                     Green Top (Gel)[[id number]]                                  Final result               Lavender Top[[id number]]                                     Final result               Gold Top - SST[[id number]]                                   Final result               Gray Top[[id number]]                                         Final result               Light Blue Top[[id number]]                                   Final result                 Please view results for these tests on the individual orders.   CBC AND DIFFERENTIAL    Narrative:     The following orders were created for panel order CBC & Differential.  Procedure                               Abnormality         Status                     ---------                               -----------         ------                     CBC Auto Differential[[id number]]        Abnormal            Final result               Scan Slide[[id number]]                                                                    Please view results for these tests on the individual orders.   GREEN TOP   LAVENDER TOP   GOLD TOP - SST   GRAY TOP   LIGHT BLUE TOP       Meds Given in ED:   Medications   sodium chloride 0.9 % flush 10 mL (has no administration in time range)   furosemide (LASIX) injection 80 mg (has no administration in time range)   nitroglycerin (TRIDIL) 200 mcg/ml infusion (has no administration in time range)     nitroglycerin, 5-200 mcg/min

## 2024-02-08 NOTE — TELEPHONE ENCOUNTER
[facility] called saying this pt recently had an appt with them and they noticed he has edema in his lower abdomen. They got a CT scan for him and the results that came back were not good and they are afraid it might have something to do with his liver. They are going to fax over the results of scan and office notes. They wanted him to get in today but Dr GONZALEZ did not have any openings so they are going to recommend him go to the ED. Please call pt or [facility] if you have any questions.

## 2024-02-08 NOTE — Clinical Note
Level of Care: Telemetry [5]   Diagnosis: Anasarca [[id number]]   Admitting Physician: EDGAR MATHEW [[id number]]

## 2024-02-09 ENCOUNTER — APPOINTMENT (OUTPATIENT)
Dept: CARDIOLOGY | Facility: HOSPITAL | Age: 84
DRG: 291 | End: 2024-02-09
Payer: MEDICARE

## 2024-02-09 ENCOUNTER — APPOINTMENT (OUTPATIENT)
Dept: CT IMAGING | Facility: HOSPITAL | Age: 84
DRG: 291 | End: 2024-02-09
Payer: MEDICARE

## 2024-02-09 PROBLEM — E44.0 MODERATE MALNUTRITION: Status: ACTIVE | Noted: 2024-02-09

## 2024-02-09 LAB
ALBUMIN SERPL-MCNC: 4.2 G/DL (ref 3.5–5.2)
ALBUMIN/GLOB SERPL: 1.9 G/DL
ALP SERPL-CCNC: 70 U/L (ref 39–117)
ALT SERPL W P-5'-P-CCNC: 24 U/L (ref 1–41)
ANION GAP SERPL CALCULATED.3IONS-SCNC: 13 MMOL/L (ref 5–15)
AST SERPL-CCNC: 23 U/L (ref 1–40)
BACTERIA UR QL AUTO: ABNORMAL /HPF
BASOPHILS # BLD AUTO: 0.05 10*3/MM3 (ref 0–0.2)
BASOPHILS NFR BLD AUTO: 1 % (ref 0–1.5)
BILIRUB SERPL-MCNC: 0.9 MG/DL (ref 0–1.2)
BILIRUB UR QL STRIP: NEGATIVE
BUN SERPL-MCNC: 13 MG/DL (ref 8–23)
BUN/CREAT SERPL: 12.6 (ref 7–25)
CALCIUM SPEC-SCNC: 8.5 MG/DL (ref 8.6–10.5)
CHLORIDE SERPL-SCNC: 95 MMOL/L (ref 98–107)
CLARITY UR: CLEAR
CO2 SERPL-SCNC: 32 MMOL/L (ref 22–29)
COLOR UR: YELLOW
CREAT SERPL-MCNC: 1.03 MG/DL (ref 0.76–1.27)
DEPRECATED RDW RBC AUTO: 49.7 FL (ref 37–54)
EGFRCR SERPLBLD CKD-EPI 2021: 72.1 ML/MIN/1.73
EOSINOPHIL # BLD AUTO: 0.18 10*3/MM3 (ref 0–0.4)
EOSINOPHIL NFR BLD AUTO: 3.5 % (ref 0.3–6.2)
ERYTHROCYTE [DISTWIDTH] IN BLOOD BY AUTOMATED COUNT: 15.8 % (ref 12.3–15.4)
GLOBULIN UR ELPH-MCNC: 2.2 GM/DL
GLUCOSE BLDC GLUCOMTR-MCNC: 109 MG/DL (ref 70–130)
GLUCOSE BLDC GLUCOMTR-MCNC: 132 MG/DL (ref 70–130)
GLUCOSE SERPL-MCNC: 94 MG/DL (ref 65–99)
GLUCOSE UR STRIP-MCNC: NEGATIVE MG/DL
HCT VFR BLD AUTO: 35.8 % (ref 37.5–51)
HGB BLD-MCNC: 12 G/DL (ref 13–17.7)
HGB UR QL STRIP.AUTO: ABNORMAL
HYALINE CASTS UR QL AUTO: ABNORMAL /LPF
IMM GRANULOCYTES # BLD AUTO: 0.02 10*3/MM3 (ref 0–0.05)
IMM GRANULOCYTES NFR BLD AUTO: 0.4 % (ref 0–0.5)
KETONES UR QL STRIP: NEGATIVE
LEUKOCYTE ESTERASE UR QL STRIP.AUTO: NEGATIVE
LYMPHOCYTES # BLD AUTO: 0.43 10*3/MM3 (ref 0.7–3.1)
LYMPHOCYTES NFR BLD AUTO: 8.4 % (ref 19.6–45.3)
MCH RBC QN AUTO: 29.1 PG (ref 26.6–33)
MCHC RBC AUTO-ENTMCNC: 33.5 G/DL (ref 31.5–35.7)
MCV RBC AUTO: 86.7 FL (ref 79–97)
MONOCYTES # BLD AUTO: 0.49 10*3/MM3 (ref 0.1–0.9)
MONOCYTES NFR BLD AUTO: 9.6 % (ref 5–12)
NEUTROPHILS NFR BLD AUTO: 3.94 10*3/MM3 (ref 1.7–7)
NEUTROPHILS NFR BLD AUTO: 77.1 % (ref 42.7–76)
NITRITE UR QL STRIP: NEGATIVE
NRBC BLD AUTO-RTO: 0 /100 WBC (ref 0–0.2)
PH UR STRIP.AUTO: 7 [PH] (ref 5–8)
PLATELET # BLD AUTO: 119 10*3/MM3 (ref 140–450)
PMV BLD AUTO: 10.1 FL (ref 6–12)
POTASSIUM SERPL-SCNC: 3.7 MMOL/L (ref 3.5–5.2)
PROT SERPL-MCNC: 6.4 G/DL (ref 6–8.5)
PROT UR QL STRIP: NEGATIVE
RBC # BLD AUTO: 4.13 10*6/MM3 (ref 4.14–5.8)
RBC # UR STRIP: ABNORMAL /HPF
REF LAB TEST METHOD: ABNORMAL
SODIUM SERPL-SCNC: 140 MMOL/L (ref 136–145)
SP GR UR STRIP: 1.01 (ref 1–1.03)
SQUAMOUS #/AREA URNS HPF: ABNORMAL /HPF
UROBILINOGEN UR QL STRIP: ABNORMAL
WBC # UR STRIP: ABNORMAL /HPF
WBC NRBC COR # BLD AUTO: 5.11 10*3/MM3 (ref 3.4–10.8)

## 2024-02-09 PROCEDURE — 81001 URINALYSIS AUTO W/SCOPE: CPT | Performed by: INTERNAL MEDICINE

## 2024-02-09 PROCEDURE — 99232 SBSQ HOSP IP/OBS MODERATE 35: CPT | Performed by: INTERNAL MEDICINE

## 2024-02-09 PROCEDURE — 82948 REAGENT STRIP/BLOOD GLUCOSE: CPT

## 2024-02-09 PROCEDURE — 85025 COMPLETE CBC W/AUTO DIFF WBC: CPT | Performed by: STUDENT IN AN ORGANIZED HEALTH CARE EDUCATION/TRAINING PROGRAM

## 2024-02-09 PROCEDURE — 97161 PT EVAL LOW COMPLEX 20 MIN: CPT

## 2024-02-09 PROCEDURE — 80053 COMPREHEN METABOLIC PANEL: CPT | Performed by: STUDENT IN AN ORGANIZED HEALTH CARE EDUCATION/TRAINING PROGRAM

## 2024-02-09 PROCEDURE — 93306 TTE W/DOPPLER COMPLETE: CPT | Performed by: INTERNAL MEDICINE

## 2024-02-09 PROCEDURE — 97535 SELF CARE MNGMENT TRAINING: CPT

## 2024-02-09 PROCEDURE — 97165 OT EVAL LOW COMPLEX 30 MIN: CPT

## 2024-02-09 PROCEDURE — 25010000002 FUROSEMIDE PER 20 MG: Performed by: INTERNAL MEDICINE

## 2024-02-09 PROCEDURE — 25510000001 IOPAMIDOL PER 1 ML: Performed by: INTERNAL MEDICINE

## 2024-02-09 PROCEDURE — 25010000002 NITROGLYCERIN 200 MCG/ML SOLUTION: Performed by: EMERGENCY MEDICINE

## 2024-02-09 PROCEDURE — 97116 GAIT TRAINING THERAPY: CPT

## 2024-02-09 PROCEDURE — 93306 TTE W/DOPPLER COMPLETE: CPT

## 2024-02-09 PROCEDURE — 71275 CT ANGIOGRAPHY CHEST: CPT

## 2024-02-09 RX ORDER — FUROSEMIDE 10 MG/ML
40 INJECTION INTRAMUSCULAR; INTRAVENOUS
Status: DISCONTINUED | OUTPATIENT
Start: 2024-02-09 | End: 2024-02-10

## 2024-02-09 RX ORDER — METOPROLOL SUCCINATE 25 MG/1
25 TABLET, EXTENDED RELEASE ORAL
Status: DISCONTINUED | OUTPATIENT
Start: 2024-02-09 | End: 2024-02-10 | Stop reason: HOSPADM

## 2024-02-09 RX ORDER — FLECAINIDE ACETATE 50 MG/1
50 TABLET ORAL EVERY 12 HOURS SCHEDULED
Status: DISCONTINUED | OUTPATIENT
Start: 2024-02-09 | End: 2024-02-10 | Stop reason: HOSPADM

## 2024-02-09 RX ORDER — ACETAMINOPHEN 325 MG/1
650 TABLET ORAL EVERY 6 HOURS PRN
Status: DISCONTINUED | OUTPATIENT
Start: 2024-02-09 | End: 2024-02-10 | Stop reason: HOSPADM

## 2024-02-09 RX ORDER — ACETAMINOPHEN 325 MG/1
650 TABLET ORAL EVERY 6 HOURS PRN
Status: DISCONTINUED | OUTPATIENT
Start: 2024-02-09 | End: 2024-02-09 | Stop reason: SDUPTHER

## 2024-02-09 RX ADMIN — SACUBITRIL AND VALSARTAN 1 TABLET: 49; 51 TABLET, FILM COATED ORAL at 20:11

## 2024-02-09 RX ADMIN — SPIRONOLACTONE 25 MG: 25 TABLET ORAL at 09:04

## 2024-02-09 RX ADMIN — TERAZOSIN HYDROCHLORIDE 1 MG: 1 CAPSULE ORAL at 20:11

## 2024-02-09 RX ADMIN — RIVAROXABAN 20 MG: 20 TABLET, FILM COATED ORAL at 19:16

## 2024-02-09 RX ADMIN — FLECAINIDE ACETATE 50 MG: 50 TABLET ORAL at 13:08

## 2024-02-09 RX ADMIN — ACETAMINOPHEN 650 MG: 325 TABLET ORAL at 09:03

## 2024-02-09 RX ADMIN — SERTRALINE HYDROCHLORIDE 50 MG: 50 TABLET ORAL at 09:04

## 2024-02-09 RX ADMIN — FUROSEMIDE 40 MG: 10 INJECTION, SOLUTION INTRAMUSCULAR; INTRAVENOUS at 19:16

## 2024-02-09 RX ADMIN — IOPAMIDOL 75 ML: 755 INJECTION, SOLUTION INTRAVENOUS at 11:11

## 2024-02-09 RX ADMIN — ASPIRIN 81 MG: 81 TABLET, COATED ORAL at 10:21

## 2024-02-09 RX ADMIN — Medication 10 ML: at 20:11

## 2024-02-09 RX ADMIN — METOPROLOL SUCCINATE 25 MG: 25 TABLET, EXTENDED RELEASE ORAL at 09:06

## 2024-02-09 RX ADMIN — EMPAGLIFLOZIN 10 MG: 10 TABLET, FILM COATED ORAL at 09:04

## 2024-02-09 RX ADMIN — PANTOPRAZOLE SODIUM 40 MG: 40 TABLET, DELAYED RELEASE ORAL at 09:05

## 2024-02-09 RX ADMIN — FLECAINIDE ACETATE 50 MG: 50 TABLET ORAL at 20:11

## 2024-02-09 RX ADMIN — Medication 10 ML: at 09:07

## 2024-02-09 RX ADMIN — LEVOTHYROXINE SODIUM 150 MCG: 0.15 TABLET ORAL at 09:04

## 2024-02-09 RX ADMIN — FUROSEMIDE 40 MG: 10 INJECTION, SOLUTION INTRAMUSCULAR; INTRAVENOUS at 09:06

## 2024-02-09 RX ADMIN — ROSUVASTATIN CALCIUM 20 MG: 20 TABLET, FILM COATED ORAL at 20:11

## 2024-02-09 RX ADMIN — SACUBITRIL AND VALSARTAN 1 TABLET: 49; 51 TABLET, FILM COATED ORAL at 09:04

## 2024-02-09 NOTE — PROGRESS NOTES
[facility] Medicine Services  PROGRESS NOTE    Patient Name: Vinnie Fulton  : 1940  MRN: [id number]    Date of Admission: 2024  Primary Care Physician: Ivan Yoder MD    Subjective   Subjective     CC:  Swelling f/u    HPI:  Patient swelling much improved - he is feeling very very well this morning and anxious to get out of bed now      Objective   Objective     Vital Signs:   Temp:  [97.7 °F (36.5 °C)-97.8 °F (36.6 °C)] 97.8 °F (36.6 °C)  Heart Rate:  [47-89] 62  Resp:  [16-20] 18  BP: (105-196)/() 140/90  Flow (L/min):  [2] 2     Physical Exam:  Constitutional: No acute distress, awake, alert  HENT: NCAT, mucous membranes moist  Respiratory: Clear to auscultation bilaterally, respiratory effort normal on room air  Cardiovascular: RRR, no murmurs, rubs, or gallops, minimal LE edema improved  Gastrointestinal: Soft, nontender, nondistended  Musculoskeletal: Muscle tone within normal limits, no joint effusions appreciated  Psychiatric: Appropriate affect, cooperative  Neurologic: Alert and oriented, facial movements symmetric and spontaneous movement of all 4 extremities grossly equal bilaterally, speech clear  Skin: No rashes    Results Reviewed:  LAB RESULTS:      Lab 24  03124  1141   WBC 5.11 4.61   HEMOGLOBIN 12.0* 12.0*   HEMATOCRIT 35.8* 37.1*   PLATELETS 119* 106*   NEUTROS ABS 3.94 3.88   IMMATURE GRANS (ABS) 0.02 0.01   LYMPHS ABS 0.43* 0.36*   MONOS ABS 0.49 0.25   EOS ABS 0.18 0.08   MCV 86.7 89.2         Lab 24  03124  1141   SODIUM 140 135*   POTASSIUM 3.7 4.3   CHLORIDE 95* 96*   CO2 32.0* 30.0*   ANION GAP 13.0 9.0   BUN 13 16   CREATININE 1.03 1.01   EGFR 72.1 73.8   GLUCOSE 94 136*   CALCIUM 8.5* 8.4*         Lab 24  03124  1141   TOTAL PROTEIN 6.4 6.8   ALBUMIN 4.2 4.5   GLOBULIN 2.2 2.3   ALT (SGPT) 24 29   AST (SGOT) 23 25   BILIRUBIN 0.9 0.8   ALK PHOS 70 73         Lab 24  8685  02/08/24 2000 02/08/24  1141   PROBNP  --   --  3,498.0*   HSTROP T 26* 26* 22*                 Brief Urine Lab Results  (Last result in the past 365 days)        Color   Clarity   Blood   Leuk Est   Nitrite   Protein   CREAT   Urine HCG        02/09/24 1036 Yellow   Clear   Large (3+)   Negative   Negative   Negative                   Microbiology Results Abnormal       None            CT Angiogram Chest Pulmonary Embolism    Result Date: 2/9/2024  CT ANGIOGRAM CHEST PULMONARY EMBOLISM Date of Exam: 2/9/2024 11:08 AM EST Indication: Pulmonary embolism (PE) suspected, high prob. Comparison: CT chest dated 2/8/2024 Technique: Axial CT images were obtained of the chest after the uneventful intravenous administration of 75 mL Isovue-370 utilizing pulmonary embolism protocol.  Reconstructed coronal and sagittal images were also obtained. Automated exposure control and  iterative construction methods were used. Findings: Pulmonary arteries: Adequate opacification of the pulmonary arteries. No evidence of acute pulmonary embolism. Stable mild prominent appearance of the pulmonary arteries Lungs and Pleura: There are mild bibasilar atelectasis. Mild diffuse bilateral patchy groundglass opacities with air trapping. Small right pleural effusion Mediastinum/Peyton: No definite pathologic mediastinal or hilar lymphadenopathy. Calcified mediastinal and hilar lymph nodes from previous granulomatous infection Lymph nodes: No axillary or supraclavicular adenopathy. Cardiovascular: The cardiac chambers are within normal limits. The pericardium is normal. The aorta and its arch branch vessels are unremarkable.  Upper Abdomen: There is minimal nodularity of the liver surface. Mild amount of ascites. There is diffuse hyperdensity within the gallbladder lumen possibly representing vicarious contrast excretion. Cannot exclude associated gallstones or sludge Bones and Soft Tissue: No suspicious osseous lesion.     Impression: Impression:  1. No evidence of pulmonary embolism 2. Mild diffuse bilateral groundglass airspace disease with air trapping. Also bibasilar discoid atelectasis 3. Small right pleural effusion 4. Ancillary findings as above Electronically Signed: José Miguel Mendez MD  2/9/2024 11:34 AM EST  Workstation ID: [id number]    CT Chest Without Contrast Diagnostic    Result Date: 2/8/2024  CT CHEST WO CONTRAST DIAGNOSTIC Date of Exam: 2/8/2024 3:44 PM EST Indication: Shortness of breath.  Patient already received contrast load for a CT scan of his abdomen at outside facility this morning.. Comparison: Plain film obtained on the same date Technique: Axial CT images were obtained of the chest without contrast administration.  Reconstructed coronal and sagittal images were also obtained. Automated exposure control and iterative construction methods were used. The subglottic airway is clear. Small right and trace left pleural effusions. Bilateral lobe calcified granulomata. No pneumothorax. Calcified mediastinal lymph nodes consistent with prior bouts of pancreatitis. Left lower lobe calcified granulomata. The  thyroid gland appears atrophic. No adenopathy within the chest. Enlargement of the main pulmonary artery which measures 3.9 cm suggesting pulmonary arterial hypertension. Atheromatous disease of the coronary vessels. Small volume perihepatic ascites. Colonic diverticulosis. Excretion of IV contrast in the kidneys. Otherwise the visualized upper abdomen is normal. Degenerative changes of the thoracic spine.     Impression: Small right and trace left pleural effusions with evidence of prior granulomatous disease. Electronically Signed: Vinnie Nagy MD  2/8/2024 3:52 PM EST  Workstation ID: [id number]    XR Chest 1 View    Result Date: 2/8/2024  XR CHEST 1 VW Date of Exam: 2/8/2024 11:40 AM EST Indication: SOA triage protocol Comparison: Chest x-ray 10/29/2021 Findings: Mild cardiomegaly. The lungs are grossly clear without focal consolidation.  No pleural effusion or pneumothorax.     Impression: Impression: No acute cardiopulmonary findings. Electronically Signed: Alfonzo Arreola MD  2024 12:13 PM EST  Workstation ID: [id number]    CT Abdomen Pelvis With Contrast    Result Date: 2024  [facility] [address] [place] [address] Patient Name: JUDE RICHARD Patient : 1940 Patient MRN: [id number] Ordering Provider: HARRY GLEASON EXAM DATE: 2024 EXAM: CT A/P WITH CONTRAST CLINICAL INFORMATION: Prostate cancer follow-up. TECHNIQUE: No POC testing for eGFR was performed due to absence of risk factors. Multiple axial CT images of the abdomen and pelvis were obtained after injection of 100 mL Omnipaque 350 (1 x 100 mL bottle of NDC [address]-38). None was wasted and discarded. Bowel was opacified with oral contrast. COMPARISON: PSMA PET CT 2023 FINDINGS ON CT ABDOMEN: LOWER THORAX: Chronic bronchiectatic/fibrotic changes are seen in both lung bases. There is a moderate-sized right pleural effusion covering approximately 10% of the right hemithorax. There is a small left pleural effusion. No obvious cardiac abnormality.       UPPER ABDOMINAL ORGANS: Liver, gallbladder, spleen, pancreas, adrenals and both kidneys are normal.   BOWEL AND MESENTERY: Stomach, small bowel and colon are normal. Mild ascites is seen. A few small scattered subcentimeter mesenteric lymph nodes are seen. Not significant by size criteria. RETROPERITONEUM: Aorta shows atherosclerotic calcifications with normal aortic caliber. IVC and branches are patent and normal. No retroperitoneal lymphadenopathy. BODY WALL AND MUSCULOSKELETAL STRUCTURES: Degenerative changes of the lumbar spine are noted. Anterior abdominal wall is normal. FINDINGS ON CT PELVIS: PELVIC CAVITY: Urinary bladder and rectosigmoid are normal. Prostate gland is mildly enlarged. Fiducial markers are seen in the prostate gland. Small amount of fluid is seen in the pelvis. Previously  seen lymph nodes have decreased in size now measuring 2-6 mm. There are seen in image 274, 504 and 529 of series 202. No new lymphadenopathy is seen. MUSCULOSKELETAL STRUCTURES: Markedly abnormal bone texture is seen in the sacrum and right ilium. It was present in the previous examination also and did not show PSMA uptake. 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 Edema is seen in the subcutaneous tissues of the pelvis. COMBINED IMPRESSION: 1. Decrease in the size of the previously seen pelvic lymph nodes. 2. Development of new bilateral pleural effusions and ascites. Cause and clinical significance are not clear. 3. Development of edema in the subcutaneous tissues of the pelvis. 4. Abnormal bone texture of sacrum and right ilium. It did not show PSMA uptake in the past. It is stable. 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 Interpreted By: Jourdan Fatima MD Electronically Signed By: Jourdan Fatima MD on 2/8/2024 9:47 AM     Results for orders placed during the hospital encounter of 10/29/21    Adult Transthoracic Echo Complete W/ Cont if Necessary Per Protocol (With Agitated Saline)    Interpretation Summary  · Estimated left ventricular EF = 60%  · The cardiac valves are anatomically and functionally normal.  · Normal agitated saline bubble study      Current medications:  Scheduled Meds:aspirin, 81 mg, Oral, Daily  empagliflozin, 10 mg, Oral, Daily  flecainide, 50 mg, Oral, Q12H  furosemide, 40 mg, Intravenous, BID  insulin lispro, 2-7 Units, Subcutaneous, 4x Daily AC & at Bedtime  levothyroxine, 150 mcg, Oral, QAM AC  metoprolol succinate XL, 25 mg, Oral, Q24H  pantoprazole, 40 mg, Oral, QAM AC  pharmacy consult - [facility], , Does not apply, Daily  rivaroxaban, 20 mg, Oral, Daily With Dinner  rosuvastatin, 20 mg, Oral, Nightly  sacubitril-valsartan, 1 tablet, Oral, Q12H  sertraline, 50 mg, Oral, Daily  sodium chloride, 10 mL, Intravenous, Q12H  spironolactone, 25 mg, Oral, Daily  terazosin, 1 mg,  Oral, Nightly      Continuous Infusions:   PRN Meds:.  acetaminophen    senna-docusate sodium **AND** polyethylene glycol **AND** bisacodyl **AND** bisacodyl    dextrose    dextrose    glucagon (human recombinant)    meclizine    sodium chloride    sodium chloride    sodium chloride    Assessment & Plan   Assessment & Plan     Active Hospital Problems    Diagnosis  POA    **Anasarca [R60.1]  Yes    Hypertensive emergency [I16.1]  Yes    Type 2 diabetes mellitus [E11.9]  Yes    Prostate cancer [C61]  Yes    Essential hypertension, benign [I10]  Yes    Hypothyroidism [E03.9]  Yes      Resolved Hospital Problems   No resolved problems to display.        Brief Hospital Course to date:  Vinnie Fulton is a 83 y.o. male w paroxysmal Afib on xarelto, prostate cancer ([facility]) who presented with dyspnea on exertion and lower extremity swelling    Acute on chronic CHFpEF  -ECHO pending, no proteinuria to suggest alternate cause of swelling  -IV lasix BID for net negative status, discharge w lasix per cardiology ([facility])  -d/c meds per cardiology    HTN urgency  -med titration per cardiology, added meds 2/9: entresto, jardiance    Paroxysmal Afib - home xarelto, flecainide, metoprolol    Prostate cancer ([facility])   - home doxazosin, OK for home orgovyx if supply. F/u oncologist OP.   -If hematuria persists, d/c aspirin (given already on xarelto above)    DMII - home diabetic diet, OK to d/c fingersticks + SSI given good control at home    Expected Discharge Location and Transportation: home  Expected Discharge 2/10 (Discharge date is tentative pending patient's medical condition and is subject to change)  Expected Discharge Date: 2/10/2024; Expected Discharge Time:      DVT prophylaxis:  Medical DVT prophylaxis orders are present.         AM-PAC 6 Clicks Score (PT): 19 (02/09/24 7453)    CODE STATUS:   Code Status and Medical Interventions:   Ordered at: 02/08/24 3727     Level Of Support Discussed With:    Patient     Code  Status (Patient has no pulse and is not breathing):    CPR (Attempt to Resuscitate)     Medical Interventions (Patient has pulse or is breathing):    Full Support       Marva Enriquez MD  02/09/24    Level 3 note: 2/3 of the below    1) Problem  Severe exacerbation of chronic illness: acute on chronic CHFpEF    2) Data    Test reviewed:  Tests independently interpreted, see above read: CTA chest personally reviewed and interpreted: no significant lung disease, no PE appreciated    Outside group discussion: d/w cardiology APRN 2/9    3) Risk  Drug therapy requiring intensive monitoring for toxicity: IV lasix requiring BMP monitoring

## 2024-02-09 NOTE — PLAN OF CARE
Goal Outcome Evaluation:  Plan of Care Reviewed With: patient        Progress: no change  Outcome Evaluation: Patient presents with mild deficits in strength, balance, and activity tolerance compared to baseline. He benefitted from cues for safety during ambulation with use of RW. Skilled IP PT services warranted to address deficits and support return to baseline. Recommend home with assist at discharge.      Anticipated Discharge Disposition (PT): home with assist

## 2024-02-09 NOTE — CONSULTS
"                  Clinical Nutrition   Nutrition Support Assessment  Reason for Visit: Consult/NSG screen      Patient Name: Vinnie Fulton  YOB: 1940  MRN: [id number]  Date of Encounter: 02/09/24 10:15 EST  Admission date: 2/8/2024    Comments:  Pt meets criteria for moderate (non-severe) malnutrition in the context of chronic illness indicated by po intake </=75% EEN x >/=1 month, moderate muscle wasting and subcutaneous fat loss. Of note, pt has had 18lb/8% wt loss in the past 5 months.     Pt declines ONS at this time; will provide whole milk on all trays.     Nutrition Assessment   Admission Diagnosis:  Anasarca [R60.1]      Problem List:    Anasarca    Essential hypertension, benign    Hypothyroidism    Hypertensive emergency    Prostate cancer    Type 2 diabetes mellitus        PMH:   He  has a past medical history of Fistula-in-ano, GERD (gastroesophageal reflux disease), History of radiation therapy, Hyperlipidemia, Hypertension, Hyperthyroidism, Perirectal abscess, and Prostate cancer.    PSH:  He  has a past surgical history that includes Carpal tunnel release.    Applicable Nutrition Concerns:   Skin:  Oral:  GI:    Applicable Interval History:         Reported/Observed/Food/Nutrition Related History:     Very pleasant pt, reports he ate well for breakfast this morning which is more than he's done in the past few months. Pt tells me his intake has been ~50% of usual since September when he had his last radiation treatment. Pt reports loss of appetite and never returned, also reporting abdominal fullness/bloating which has caused him to eat small portions. Pt reports UBW~220lbs and endorses wt loss in the past 5 months. Pt does not like ONS, open to drinking whole milk on trays for added kcals. NKFA.     Anthropometrics     Flowsheet Rows      Flowsheet Row First Filed Value   Admission Height 180.3 cm (71\") Documented at 02/08/2024 1109   Admission Weight 102 kg (225 lb) Documented at " "02/08/2024 1109          Height: Height: 180.3 cm (71\")  Last Filed Weight: Weight: 95.2 kg (209 lb 14.4 oz) (02/09/24 0417)  Method: Weight Method: Bed scale  BMI: BMI (Calculated): 29.3  BMI classification: Overweight: 25.0-29.9kg/m2   IBW:  75kg    UBW:  220lbs per pt report  Weight change:   18lb/8% wt loss in the past 5 months   Weight       Weight (kg) Weight (lbs) Weight Method Visit Report   1/13/2023 104.781 kg  231 lb   --    3/17/2023 104.781 kg  231 lb      3/22/2023 97.523 kg  215 lb   --    4/13/2023 103.874 kg  229 lb   --    9/28/2023 103.057 kg  227 lb 3.2 oz  Standing scale     10/13/2023 96.616 kg  213 lb   --    2/8/2024 102.059 kg  225 lb      2/9/2024 95.21 kg  209 lb 14.4 oz  Bed scale         Nutrition Focused Physical Exam     Date:     2/9    Patient meets criteria for malnutrition diagnosis, see MSA note.    Current Nutrition Prescription   PO: Diet: Cardiac Diets; Healthy Heart (2-3 Na+); Texture: Regular Texture (IDDSI 7); Fluid Consistency: Thin (IDDSI 0)  Oral Nutrition Supplement:   Intake: Insufficient data 100% breakfast      Nutrition Diagnosis   Date: 2/9             Updated:    Problem Malnutrition moderate chronic   Etiology Energy intake < energy needs 2/2 effects of radiation, abd fullness   Signs/Symptoms po intake </=75% EEN x >/=1 month, moderate muscle wasting and subcutaneous fat loss.    Status:     Goal:   General: Nutrition to support treatment  PO: Maintain intake  EN/PN: N/A    Nutrition Intervention      Follow treatment progress, Care plan reviewed, Encourage intake, Supplement offered/refused    Whole milk on all trays    Monitoring/Evaluation:   Per protocol, PO intake, Weight      Magy Kuhn MS,RD,LD  Time Spent: 25  "

## 2024-02-09 NOTE — CONSULTS
Cardiology Consult - [facility]    Vinnie Fulton     [id number]  1940  [facility] [place] [address]         Admission Date:  2/8/2024    Consultation Date:  02/09/24        PCP:  Ivan Yoder MD  Referring MD: Dr. Chaudhary  Consulting MD: Dr. Ever Diaz          CC: Anasarca    Reason for Consult: uncontrolled hypertension, mild CHF            Allergies:  is allergic to atorvastatin; lisinopril-hydrochlorothiazide; lortab [hydrocodone-acetaminophen]; tetanus toxoid, adsorbed; and tetanus toxoids.    Medications Prior to Admission   Medication Sig Dispense Refill Last Dose    aspirin 81 MG EC tablet Take 1 tablet by mouth Daily. OTC   2/8/2024    doxazosin (CARDURA) 4 MG tablet Take 1 tablet by mouth Every Night.   2/7/2024    flecainide (TAMBOCOR) 50 MG tablet Take 1 tablet by mouth 2 (Two) Times a Day.   2/8/2024    levothyroxine (SYNTHROID, LEVOTHROID) 150 MCG tablet Take 1 tablet by mouth Daily. (Patient taking differently: Take 1 tablet by mouth Every Morning.) 90 tablet 3 2/8/2024    losartan (Cozaar) 100 MG tablet Take 1 tablet by mouth Daily. 90 tablet 3 2/8/2024    metFORMIN ER (GLUCOPHAGE-XR) 500 MG 24 hr tablet Take 2 tablets by mouth Daily With Breakfast. 180 tablet 3 2/8/2024    metoprolol succinate XL (TOPROL-XL) 25 MG 24 hr tablet Take 1 tablet by mouth Daily.   2/8/2024    Orgovyx 120 MG tablet tablet Take 1 tablet by mouth Daily.   2/8/2024    pantoprazole (PROTONIX) 40 MG EC tablet TAKE 1 TABLET BY MOUTH DAILY 90 tablet 3 2/8/2024    rivaroxaban (XARELTO) 20 MG tablet Take 1 tablet by mouth Daily With Dinner.   2/7/2024    rosuvastatin (CRESTOR) 20 MG tablet Take 1 tablet by mouth Every Night. 30 tablet 5 2/7/2024    sertraline (ZOLOFT) 50 MG tablet Take 1 tablet by mouth Daily. 90 tablet 3 2/8/2024    spironolactone (ALDACTONE) 25 MG tablet Take 1 tablet by mouth Every Morning. 90 tablet 3 2/8/2024                HPI:  Vinnie Fulton is an  83-year-old  CM with PMHx HTN, HLP, DM2, HFpEF, prostate cancer currently on hormone therapy, former tobacco abuse, hypothyroidism.  He presents to [facility] ED with diffuse anasarca and increasing dyspnea, orthopnea.  He reports this has been ongoing for 3 to 4 weeks.  The only change to his medical regimen is that he started hormone therapy for his prostate cancer at the end of August.  He reports some pedal edema but endorses significant abdominal, scrotal and penile edema.  He does have SOA and orthopnea but at time of evaluation, reports he is breathing much better.  He was admitted to hospitalist services and given IV diuretics.  He was also started on IV nitro for BP control.  Cardiology was asked to consult with patient for further evaluation.    At time of consultation, patient is on a Cardene drip.  SBP is 156.  Patient is sitting up in bed eating breakfast.  He reports his appetite is good and he has had no change in bowel or bladder habits.  He reiterates there have been no recent changes to his medical regimen other than therapy for his prostate cancer which started at the end of August.  He denies angina or chest discomfort, denies dizziness, denies palpitations, denies syncope.  proBNP 3498.  There was report that patient was in A-fib at admission but telemetry reviewed and he has actually been in NSR the entire time.          Social History     Socioeconomic History    Marital status:    Tobacco Use    Smoking status: Former     Packs/day: 1.00     Years: 1.00     Additional pack years: 0.00     Total pack years: 1.00     Types: Cigarettes     Quit date: 2000     Years since quittin.1    Smokeless tobacco: Former   Vaping Use    Vaping Use: Never used   Substance and Sexual Activity    Alcohol use: No    Drug use: No    Sexual activity: Not Currently     Family History   Problem Relation Age of Onset    Heart disease Mother     Diabetes Mother     Heart disease Father     Diabetes  "Father     Cancer Father      Past Surgical History:   Procedure Laterality Date    CARPAL TUNNEL RELEASE       ROS: Pertinent items are noted in HPI, all other systems reviewed and negative     Objective     height is 180.3 cm (71\") and weight is 95.2 kg (209 lb 14.4 oz). His oral temperature is 97.8 °F (36.6 °C). His blood pressure is 133/72 and his pulse is 60. His respiration is 18 and oxygen saturation is 94%.    Intake/Output Summary (Last 24 hours) at 2/9/2024 0901  Last data filed at 2/9/2024 0854  Gross per 24 hour   Intake 890 ml   Output 5125 ml   Net -4235 ml     Intake/Output                         02/08/24 0701 - 02/09/24 0700 02/09/24 0701 - 02/10/24 0700     [id number] 4768-2942 Total 6166-9423 0774-1220 Total                 Intake    P.O.  --  240 240  650  -- 650    Total Intake -- 240 240 650 -- 650       Output    Urine  2900  2225 5125  --  -- --    Total Output 2900 2225 5125 -- -- --               02/08/24  1109 02/09/24  0417   Weight: 102 kg (225 lb) 95.2 kg (209 lb 14.4 oz)          Physical Exam:  General Appearance:    Alert, cooperative, in no acute distress   Head:    Normocephalic, without obvious abnormality, atraumatic   Eyes:            Lids and lashes normal, conjunctivae and sclerae normal, no   icterus, no pallor, corneas clear, PERRLA. + Glasses   Ears:    Ears appear intact with no abnormalities noted   Throat:   No oral lesions, no thrush, oral mucosa moist   Neck:   No adenopathy, supple, trachea midline, no thyromegaly, no carotid bruit, no JVD   Back:     No kyphosis present, no scoliosis present, no skin lesions,    erythema or scars, no tenderness to percussion or                   palpation, range of motion normal   Lungs:     Clear to auscultation,respirations regular, even and               unlabored    Heart:    Regular rhythm and normal rate, normal S1 and S2, no         murmur, no gallop, no rub, no click   Abdomen:     Normal bowel sounds, no masses, no " organomegaly, soft     nontender, nondistended, no guarding, no rebound   tenderness   Extremities:   Moves all extremities well,  no cyanosis, no redness, + edema. + Scrotal edema   Pulses:   Pulses palpable and equal bilaterally   Skin:   No bleeding, bruising or rash   Lymph nodes:   No palpable adenopathy   Neurologic:   Cranial nerves 2 - 12 grossly intact, sensation intact, DTR     present and equal bilaterally          Results Review:  I personally reviewed the patient's clinical results.  Results from last 7 days   Lab Units 02/09/24  0319   WBC 10*3/mm3 5.11   HEMOGLOBIN g/dL 12.0*   HEMATOCRIT % 35.8*   PLATELETS 10*3/mm3 119*     Results from last 7 days   Lab Units 02/09/24  0319   SODIUM mmol/L 140   POTASSIUM mmol/L 3.7   CHLORIDE mmol/L 95*   CO2 mmol/L 32.0*   BUN mg/dL 13   CREATININE mg/dL 1.03   CALCIUM mg/dL 8.5*   BILIRUBIN mg/dL 0.9   ALK PHOS U/L 70   ALT (SGPT) U/L 24   AST (SGOT) U/L 23   GLUCOSE mg/dL 94               Results from last 7 days   Lab Units 02/08/24  1141   PROBNP pg/mL 3,498.0*             Radiology:  Imaging Results (Last 72 Hours)       Procedure Component Value Units Date/Time    CT Chest Without Contrast Diagnostic [[id number]] Collected: 02/08/24 1549     Updated: 02/08/24 1555    Narrative:      CT CHEST WO CONTRAST DIAGNOSTIC    Date of Exam: 2/8/2024 3:44 PM EST    Indication: Shortness of breath.  Patient already received contrast load for a CT scan of his abdomen at outside facility this morning..    Comparison: Plain film obtained on the same date    Technique: Axial CT images were obtained of the chest without contrast administration.  Reconstructed coronal and sagittal images were also obtained. Automated exposure control and iterative construction methods were used.    The subglottic airway is clear. Small right and trace left pleural effusions. Bilateral lobe calcified granulomata. No pneumothorax. Calcified mediastinal lymph nodes consistent with prior bouts of  pancreatitis. Left lower lobe calcified granulomata. The   thyroid gland appears atrophic. No adenopathy within the chest. Enlargement of the main pulmonary artery which measures 3.9 cm suggesting pulmonary arterial hypertension. Atheromatous disease of the coronary vessels.    Small volume perihepatic ascites. Colonic diverticulosis. Excretion of IV contrast in the kidneys. Otherwise the visualized upper abdomen is normal.    Degenerative changes of the thoracic spine.      Impression:      Small right and trace left pleural effusions with evidence of prior granulomatous disease.    Electronically Signed: Vinnie Nagy MD    2/8/2024 3:52 PM EST    Workstation ID: [id number]    XR Chest 1 View [[id number]] Collected: 02/08/24 1210     Updated: 02/08/24 1216    Narrative:      XR CHEST 1 VW    Date of Exam: 2/8/2024 11:40 AM EST    Indication: SOA triage protocol    Comparison: Chest x-ray 10/29/2021    Findings:  Mild cardiomegaly. The lungs are grossly clear without focal consolidation. No pleural effusion or pneumothorax.      Impression:      Impression:  No acute cardiopulmonary findings.      Electronically Signed: Alfonzo Arreola MD    2/8/2024 12:13 PM EST    Workstation ID: [id number]              Tele: Normal sinus rhythm    Assessment:   -83-year-old CM with HTN, HFpEF, PAF, prostate cancer currently undergoing treatment presents to [facility] ED with progressive dyspnea, orthopnea and diffuse anasarca.  -HTN with uncontrolled readings at admission  -HLP  -Hypothyroidism  -DM 2  -Prostate cancer, outpatient treatment since August 2023, s/p radiation  -GERD        Plan:   -Admitted to hospitalist services.  Given IV diuretic and started on IV nitro for BP management.  -Continue low-dose ASA, statin  -Patient was on ARB therapy at home.  Have reviewed BP readings.  Will discontinue IV nitro (IV Cardene ordered) and will initiate Entresto 49-51 mg twice daily.  Continue Toprol-XL 25 mg daily.  -Continue IV Lasix while in  hospital.  Transition to Lasix 40 mg p.o. daily at discharge along with home Aldactone 25 mg p.o. daily.  Add Jardiance 10 mg p.o. daily for HFpEF.  -Continue Xarelto, 20 mg p.o. daily with dinner.  -Order CTA Chest to rule out PE.  Patient does report compliance with NOAC.  -Echo ordered, will review.  -We believe that his outpatient therapy for prostate cancer may be responsible for his anasarca.  Discussed this with the patient and his son.  Also discussed this with hospitalist.  Will need to address as outpatient.  -Cardiology will see again in a.m.  Discussed our plan of care with hospitalist, Dr. Enriquez.  Hopefully home in 24 to 48 hours.          I discussed the patient's findings and my recommendations with the patient, any present family members, and the nursing staff.  Ever Diaz MD saw and examined patient, verified hx and PE, read all radiographic studies, reviewed labs and micro data, and formulated dx, plan for treatment and all medical decision making.      Chayo Fitzgerald PA-C, working with Ever Diaz MD  02/09/24 09:01 EST

## 2024-02-09 NOTE — CONSULTS
Malnutrition Severity Assessment    Patient Name:  Vinnie Fulton  YOB: 1940  MRN: [id number]  Admit Date:  2/8/2024    Patient meets criteria for : Moderate (non-severe) Malnutrition    Comments:  Pt meets criteria for moderate (non-severe) malnutrition in the context of chronic illness indicated by po intake </=75% EEN x >/=1 month, moderate muscle wasting and subcutaneous fat loss. Of note, pt has had 18lb/8% wt loss in the past 5 months.    Malnutrition Severity Assessment  Malnutrition Type: Chronic Disease - Related Malnutrition  Malnutrition Type (last 8 hours)       Malnutrition Severity Assessment       Row Name 02/09/24 1025       Malnutrition Severity Assessment    Malnutrition Type Chronic Disease - Related Malnutrition      Row Name 02/09/24 1025       Insufficient Energy Intake     Insufficient Energy Intake Findings Moderate    Insufficient Energy Intake  <75% of est. energy requirement for > or equal to 1 month      Row Name 02/09/24 1025       Muscle Loss    Loss of Muscle Mass Findings Moderate    Clavicle Bone Region Moderate - some protrusion in females, visible in males    Acromion Bone Region Moderate - acromion may slightly protrude    Scapular Bone Region Moderate - mild depression, bones may show slightly    Dorsal Hand Region Moderate - slight depression    Patellar Region Moderate - patella more prominent, less muscle definition around patella    Anterior Thigh Region Moderate - mild depression on inner thigh    Posterior Calf Region Moderate - some roundness, slight firmness      Row Name 02/09/24 1025       Fat Loss    Subcutaneous Fat Loss Findings Moderate    Orbital Region  Moderate -  somewhat hollowness, slightly dark circles    Upper Arm Region Moderate - some fat tissue, not ample    Thoracic & Lumbar Region Moderate - ribs visible with mild depressions, iliac crest somewhat prominent      Row Name 02/09/24 1025       Criteria Met (Must meet criteria for severity  in at least 2 of these categories: M Wasting, Fat Loss, Fluid, Secondary Signs, Wt. Status, Intake)    Patient meets criteria for  Moderate (non-severe) Malnutrition                    Electronically signed by:  Magy Kuhn MS,RD,LD  02/09/24 10:26 EST

## 2024-02-09 NOTE — PLAN OF CARE
Problem: Adult Inpatient Plan of Care  Goal: Absence of Hospital-Acquired Illness or Injury  Intervention: Identify and Manage Fall Risk  Description: Perform standard risk assessment on admission using a validated tool or comprehensive approach appropriate to the patient; reassess fall risk frequently, with change in status or transfer to another level of care.  Communicate fall injury risk to interprofessional healthcare team.  Determine need for increased observation, equipment and environmental modification, such as low bed, signage and supportive, nonskid footwear.  Adjust safety measures to individual developmental age, stage and identified risk factors.  Reinforce the importance of safety and physical activity with patient and family.  Perform regular intentional rounding to assess need for position change, pain assessment and personal needs, including assistance with toileting.  Recent Flowsheet Documentation  Taken 2/9/2024 0203 by Patricia Dang RN  Safety Promotion/Fall Prevention:   activity supervised   assistive device/personal items within reach   clutter free environment maintained   fall prevention program maintained   nonskid shoes/slippers when out of bed   room organization consistent   safety round/check completed   toileting scheduled  Taken 2/9/2024 0015 by Patricia Dang RN  Safety Promotion/Fall Prevention:   activity supervised   assistive device/personal items within reach   clutter free environment maintained   fall prevention program maintained   nonskid shoes/slippers when out of bed   room organization consistent   safety round/check completed   toileting scheduled  Taken 2/8/2024 2240 by Patricia Dang RN  Safety Promotion/Fall Prevention:   activity supervised   assistive device/personal items within reach   clutter free environment maintained   fall prevention program maintained   nonskid shoes/slippers when out of bed   room organization consistent   safety round/check  completed   toileting scheduled  Taken 2/8/2024 2025 by Patricia Dang RN  Safety Promotion/Fall Prevention:   activity supervised   assistive device/personal items within reach   clutter free environment maintained   fall prevention program maintained   nonskid shoes/slippers when out of bed   room organization consistent   safety round/check completed   toileting scheduled     Problem: Adult Inpatient Plan of Care  Goal: Absence of Hospital-Acquired Illness or Injury  Intervention: Prevent Skin Injury  Description: Perform a screening for skin injury risk, such as pressure or moisture associated skin damage on admission and at regular intervals throughout hospital stay.  Keep all areas of skin (especially folds) clean and dry.  Maintain adequate skin hydration.  Relieve and redistribute pressure and protect bony prominences; implement measures based on patient-specific risk factors.  Match turning and repositioning schedule to clinical condition.  Encourage weight shift frequently; assist with reposition if unable to complete independently.  Float heels off bed; avoid pressure on the Achilles tendon.  Keep skin free from extended contact with medical devices.  Encourage functional activity and mobility, as early as tolerated.  Use aids (e.g., slide boards, mechanical lift) during transfer.  Recent Flowsheet Documentation  Taken 2/9/2024 0203 by Patricia Dang RN  Body Position: position changed independently  Skin Protection:   adhesive use limited   skin-to-device areas padded   skin-to-skin areas padded   transparent dressing maintained   tubing/devices free from skin contact  Taken 2/9/2024 0015 by Patricia Dang RN  Body Position: position changed independently  Skin Protection:   adhesive use limited   skin-to-device areas padded   skin-to-skin areas padded   transparent dressing maintained   tubing/devices free from skin contact  Taken 2/8/2024 2240 by Patricia Dang RN  Body Position: position changed  independently  Skin Protection:   adhesive use limited   skin-to-device areas padded   skin-to-skin areas padded   transparent dressing maintained   tubing/devices free from skin contact  Taken 2/8/2024 2025 by Patricia Dang RN  Body Position: position changed independently  Skin Protection:   adhesive use limited   skin sealant/moisture barrier applied   skin-to-device areas padded   skin-to-skin areas padded   transparent dressing maintained   tubing/devices free from skin contact     Problem: Adult Inpatient Plan of Care  Goal: Absence of Hospital-Acquired Illness or Injury  Intervention: Prevent and Manage VTE (Venous Thromboembolism) Risk  Description: Assess for VTE (venous thromboembolism) risk.  Encourage and assist with early ambulation.  Initiate and maintain compression or other therapy, as indicated, based on identified risk in accordance with organizational protocol and provider order.  Encourage both active and passive leg exercises while in bed, if unable to ambulate.  Recent Flowsheet Documentation  Taken 2/9/2024 0203 by Patricia Dang RN  Activity Management: activity encouraged  Taken 2/9/2024 0015 by Patricia Dang RN  Activity Management: activity encouraged  Taken 2/8/2024 2240 by Patricia Dang RN  Activity Management: activity encouraged  Taken 2/8/2024 2025 by Patricia Dang RN  Activity Management: activity encouraged  VTE Prevention/Management: (see MAR) other (see comments)  Range of Motion: active ROM (range of motion) encouraged     Problem: Adult Inpatient Plan of Care  Goal: Optimal Comfort and Wellbeing  Outcome: Ongoing, Progressing  Intervention: Provide Person-Centered Care  Description: Use a family-focused approach to care.  Develop trust and rapport by proactively providing information, encouraging questions, addressing concerns and offering reassurance.  Acknowledge emotional response to hospitalization.  Recognize and utilize personal coping strategies.  Honor spiritual  and cultural preferences.  Recent Flowsheet Documentation  Taken 2/9/2024 0203 by Patricia Dang RN  Trust Relationship/Rapport:   care explained   choices provided   emotional support provided   empathic listening provided   questions answered   questions encouraged   reassurance provided   thoughts/feelings acknowledged  Taken 2/9/2024 0015 by Patricia Dang RN  Trust Relationship/Rapport:   care explained   choices provided   emotional support provided   empathic listening provided   questions answered   questions encouraged   reassurance provided   thoughts/feelings acknowledged  Taken 2/8/2024 2240 by Patricia Dang RN  Trust Relationship/Rapport:   care explained   choices provided   emotional support provided   empathic listening provided   questions answered   questions encouraged   reassurance provided   thoughts/feelings acknowledged  Taken 2/8/2024 2025 by Patricia Dang RN  Trust Relationship/Rapport:   care explained   choices provided   emotional support provided   empathic listening provided   questions answered   questions encouraged   reassurance provided   thoughts/feelings acknowledged     Problem: Heart Failure Comorbidity  Goal: Maintenance of Heart Failure Symptom Control  Outcome: Ongoing, Progressing  Intervention: Maintain Heart Failure-Management  Description: Evaluate adherence to home heart failure self-care regimen (e.g., medication, fluid balance, sodium intake, daily weight, physical activity, telemonitoring, support).  Advocate continuation of home medication and schedule.  Consider pharmacologic therapy administration time and effects (e.g., avoid giving diuretic prior to bedtime or nitrates on empty stomach).  Monitor response to pharmacologic therapy, including weight fluctuations, blood pressure and electrolyte levels.  Monitor for signs and symptoms of anxiety and depression, including severity and duration; if present, provide psychosocial support.  Consider need for heart  failure clinic or palliative care consult.  Recent Flowsheet Documentation  Taken 2/9/2024 0203 by Patricia Dang RN  Medication Review/Management: medications reviewed  Taken 2/9/2024 0015 by Patricia Dang RN  Medication Review/Management: medications reviewed  Taken 2/8/2024 2240 by Patricia Dang RN  Medication Review/Management: medications reviewed  Taken 2/8/2024 2025 by Patricia Dang RN  Medication Review/Management: medications reviewed     Problem: Pain Chronic (Persistent) (Comorbidity Management)  Goal: Acceptable Pain Control and Functional Ability  Outcome: Ongoing, Progressing  Intervention: Manage Persistent Pain  Description: Evaluate pain level, effect of treatment and patient response at regular intervals.  Minimize pain stimuli; coordinate care and adjust environment (e.g., light, noise, unnecessary movement); promote sleep/rest.  Match pharmacologic analgesia to severity and type of pain mechanism (e.g., neuropathic, muscle, inflammatory); consider multimodal approach (e.g., nonopioid, opioid, adjuvant).  Provide medication at regular intervals; titrate to patient response.  Manage breakthrough pain with additional doses; consider rotation or switching medication.  Monitor for signs of substance tolerance (increased dose to reach desired effect, decreased effect with same dose).  Avoid abrupt withdrawal of medication, especially agents capable of causing physical dependence.  Manage medication-induced effects, such as constipation, nausea, pruritus, urinary retention, somnolence and dizziness.  Provide multimodal treatment interventions, such as physical activity, therapeutic exercise, yoga, TENS (transcutaneous electrical nerve stimulation) and manual therapy.  Train in functional activity modifications, such as body mechanics, posture, ergonomics, energy conservation and activity pacing.  Consider addition of complementary or alternative therapy, such as acupuncture, hypnosis or  therapeutic touch.  Recent Flowsheet Documentation  Taken 2/9/2024 0203 by Patricia Dang RN  Medication Review/Management: medications reviewed  Taken 2/9/2024 0015 by Patricia Dang RN  Medication Review/Management: medications reviewed  Taken 2/8/2024 2240 by Patricia Dang RN  Medication Review/Management: medications reviewed  Taken 2/8/2024 2025 by Patricia Dang RN  Bowel Elimination Promotion: adequate fluid intake promoted  Medication Review/Management: medications reviewed  Intervention: Optimize Psychosocial Wellbeing  Description: Facilitate patient’s self-control over pain by providing pain information and allowing choices in treatment.  Consider and address emotional response to pain.  Explore and promote use of coping strategies; address barriers to successful coping.  Evaluate and assist with psychosocial, cultural and spiritual factors impacting pain.  Modify pain perception by using techniques, such as distraction, mindfulness, guided imagery, meditation or music.  Assess and monitor for signs and symptoms of behavioral health concerns, such as unhealthy substance use, depression and suicidal ideation.  Consider referral for ongoing coping support, such as cognitive behavioral therapy and mindfulness-based stress reduction.  Recent Flowsheet Documentation  Taken 2/9/2024 0203 by Patricia Dang RN  Supportive Measures:   active listening utilized   counseling provided   decision-making supported   positive reinforcement provided   self-care encouraged   self-reflection promoted   self-responsibility promoted   verbalization of feelings encouraged  Diversional Activities: smartphone  Family/Support System Care:   self-care encouraged   support provided  Taken 2/9/2024 0015 by Patricia Dang RN  Supportive Measures:   active listening utilized   counseling provided   decision-making supported   positive reinforcement provided   self-care encouraged   self-reflection promoted    self-responsibility promoted   verbalization of feelings encouraged  Diversional Activities: smartphone  Family/Support System Care:   self-care encouraged   support provided  Taken 2/8/2024 2240 by Patricia Dang RN  Supportive Measures:   active listening utilized   counseling provided   decision-making supported   positive reinforcement provided   self-care encouraged   self-reflection promoted   self-responsibility promoted   verbalization of feelings encouraged  Diversional Activities: smartphone  Family/Support System Care:   self-care encouraged   support provided  Taken 2/8/2024 2025 by Patricia Dang, RN  Supportive Measures:   active listening utilized   decision-making supported   positive reinforcement provided   self-reflection promoted   self-care encouraged   self-responsibility promoted   verbalization of feelings encouraged  Diversional Activities: smartphone  Family/Support System Care:   self-care encouraged   support provided     Problem: Pain Chronic (Persistent) (Comorbidity Management)  Goal: Acceptable Pain Control and Functional Ability  Intervention: Optimize Psychosocial Wellbeing  Description: Facilitate patient’s self-control over pain by providing pain information and allowing choices in treatment.  Consider and address emotional response to pain.  Explore and promote use of coping strategies; address barriers to successful coping.  Evaluate and assist with psychosocial, cultural and spiritual factors impacting pain.  Modify pain perception by using techniques, such as distraction, mindfulness, guided imagery, meditation or music.  Assess and monitor for signs and symptoms of behavioral health concerns, such as unhealthy substance use, depression and suicidal ideation.  Consider referral for ongoing coping support, such as cognitive behavioral therapy and mindfulness-based stress reduction.  Recent Flowsheet Documentation  Taken 2/9/2024 0203 by Patricia Dang, RN  Supportive  Measures:   active listening utilized   counseling provided   decision-making supported   positive reinforcement provided   self-care encouraged   self-reflection promoted   self-responsibility promoted   verbalization of feelings encouraged  Diversional Activities: smartphone  Family/Support System Care:   self-care encouraged   support provided  Taken 2/9/2024 0015 by Patricia Dang RN  Supportive Measures:   active listening utilized   counseling provided   decision-making supported   positive reinforcement provided   self-care encouraged   self-reflection promoted   self-responsibility promoted   verbalization of feelings encouraged  Diversional Activities: smartphone  Family/Support System Care:   self-care encouraged   support provided  Taken 2/8/2024 2240 by Patricia Dang RN  Supportive Measures:   active listening utilized   counseling provided   decision-making supported   positive reinforcement provided   self-care encouraged   self-reflection promoted   self-responsibility promoted   verbalization of feelings encouraged  Diversional Activities: smartphone  Family/Support System Care:   self-care encouraged   support provided  Taken 2/8/2024 2025 by Patricia Dang, RN  Supportive Measures:   active listening utilized   decision-making supported   positive reinforcement provided   self-reflection promoted   self-care encouraged   self-responsibility promoted   verbalization of feelings encouraged  Diversional Activities: smartphone  Family/Support System Care:   self-care encouraged   support provided   Goal Outcome Evaluation:  Plan of Care Reviewed With: patient        Progress: no change  Outcome Evaluation: Pt AO x4/sinus arrhythmia with frequent PACs and PVCs, 1st degree AV block per EKG. BP high, nitro drip given as ordered. Pt 93% on RA. Pt has Anasarca edema and increased urination due to medications. BP was WNL and then increased with getting up to use the urinal. Pt denies SOA/N/V/pain. Pt has  some redness to urine color and one or two small clots per pt. Continue monitoring.

## 2024-02-09 NOTE — PLAN OF CARE
Goal Outcome Evaluation:  Plan of Care Reviewed With: patient           Outcome Evaluation: Pt presents w/ generalized weakness, decreased functional endurance, and mild balance deficits limiting his ADL independence. Pt would benefit from continued skilled IPOT services to address current functional deficits and facilitate return to PLOF. Rec home w/ A at d/c.      Anticipated Discharge Disposition (OT): home with assist

## 2024-02-09 NOTE — THERAPY EVALUATION
Patient Name: Vinnie Fulton  : 1940    MRN: [id number]                              Today's Date: 2024       Admit Date: 2024    Visit Dx:     ICD-10-CM ICD-9-CM   1. Shortness of breath  R06.02 786.05   2. Anasarca  R60.1 782.3   3. Hypertensive urgency  I16.0 401.9   4. Other ascites  R18.8 789.59   5. Prostate cancer  C61 185   6. Pleural effusion  J90 511.9     Patient Active Problem List   Diagnosis    Paget's disease of bone    Esophageal reflux    Essential hypertension, benign    Allergic rhinitis    Generalized osteoarthrosis, involving multiple sites    Hyperlipidemia    Hypothyroidism    Herpes zoster    Cellulitis and abscess    Hypertensive emergency    Left facial numbness    Prostate cancer    Hypomagnesemia    Type 2 diabetes mellitus    Left-sided Bell's palsy    Tremor of both hands    Shuffling gait    Orthostatic hypotension    Pain of hand    Vertigo    Osteoarthritis    Anasarca    Moderate malnutrition     Past Medical History:   Diagnosis Date    Fistula-in-ano     GERD (gastroesophageal reflux disease)     History of radiation therapy     Hyperlipidemia     Hypertension     Hyperthyroidism     Perirectal abscess     Prostate cancer      Past Surgical History:   Procedure Laterality Date    CARPAL TUNNEL RELEASE        General Information       Row Name 24 1100          OT Time and Intention    Document Type evaluation  -     Mode of Treatment occupational therapy  -       Row Name 24 1100          General Information    Patient Profile Reviewed yes  -     Prior Level of Function independent:;bed mobility;transfer;all household mobility;community mobility;ADL's  Pt has rollator that he uses for community distances, also has SPC. Pt reports he holds onto furntiure as needed with household mobility, denies any recent falls  -     Existing Precautions/Restrictions fall  -     Barriers to Rehab medically complex  -       Row Name 24 1100           Living Environment    People in Home spouse  -[facility] Name 02/09/24 1100          Home Main Entrance    Number of Stairs, Main Entrance one  -[facility] Name 02/09/24 1100          Stairs Within Home, Primary    Number of Stairs, Within Home, Primary none  -[facility] Name 02/09/24 1100          Cognition    Orientation Status (Cognition) oriented x 4  -[facility] Name 02/09/24 1100          Safety Issues, Functional Mobility    Impairments Affecting Function (Mobility) balance;strength;endurance/activity tolerance  -               User Key  (r) = Recorded By, (t) = Taken By, (c) = Cosigned By      Initials Name Provider Type     Jaye Pinedo OT Occupational Therapist                     Mobility/ADL's       [facility] Name 02/09/24 1106          Bed Mobility    Comment, (Bed Mobility) Pt found standing at bedside, left sitting EOB  -[facility] Name 02/09/24 1106          Transfers    Transfers sit-stand transfer;stand-sit transfer;toilet transfer  -[facility] Name 02/09/24 1106          Sit-Stand Transfer    Sit-Stand [place] (Transfers) standby assist  -[facility] Name 02/09/24 1106          Stand-Sit Transfer    Stand-Sit [place] (Transfers) standby assist  -[facility] 02/09/24 1106          Toilet Transfer    Type (Toilet Transfer) sit-stand;stand-sit  -     [place] Level (Toilet Transfer) standby assist  -     Assistive Device (Toilet Transfer) commode;grab bars/safety frame  -[facility] Name 02/09/24 1106          Functional Mobility    Functional Mobility- Ind. Level standby assist  -     Functional Mobility-Distance (Feet) --  to/from bathroom  -     Functional Mobility- Safety Issues balance decreased during turns  -       Row Name 02/09/24 1106          Activities of Daily Living    BADL Assessment/Intervention upper body dressing;toileting;grooming  -[facility] Name 02/09/24 1106          Upper Body Dressing Assessment/Training    [place] Level  (Upper Body Dressing) don;pajama/robe;minimum assist (75% patient effort);verbal cues  -     Position (Upper Body Dressing) edge of bed sitting  -       Row Name 02/09/24 1106          Toileting Assessment/Training    [place] Level (Toileting) adjust/manage clothing;perform perineal hygiene;standby assist  -     Assistive Devices (Toileting) commode;grab bar/safety frame  -     Position (Toileting) supported sitting;supported standing  -       Row Name 02/09/24 1106          Grooming Assessment/Training    [place] Level (Grooming) wash face, hands;standby assist  -     Position (Grooming) supported standing;sink side  -               User Key  (r) = Recorded By, (t) = Taken By, (c) = Cosigned By      Initials Name Provider Type     Jaye Pinedo OT Occupational Therapist                   Obj/Interventions       [facility] Name 02/09/24 1108          Sensory Assessment (Somatosensory)    Sensory Assessment (Somatosensory) UE sensation intact  -[facility] Name 02/09/24 1108          Vision Assessment/Intervention    Visual Impairment/Limitations WFL  -       Row Name 02/09/24 1108          Range of Motion Comprehensive    General Range of Motion bilateral upper extremity ROM [facility] Name 02/09/24 1108          Strength Comprehensive (MMT)    General Manual Muscle Testing (MMT) Assessment upper extremity strength deficits identified  -     Comment, General Manual Muscle Testing (MMT) Assessment BUE grossly 4+/5  -[facility] Name 02/09/24 1108          Balance    Balance Assessment sitting static balance;sitting dynamic balance;sit to stand dynamic balance;standing static balance;standing dynamic balance  -     Static Sitting Balance standby assist  -     Dynamic Sitting Balance standby assist  -     Position, Sitting Balance unsupported;sitting edge of bed  -     Sit to Stand Dynamic Balance standby assist  -     Static Standing Balance standby assist  -      Dynamic Standing Balance contact guard  -     Position/Device Used, Standing Balance unsupported  -     Balance Interventions sitting;sit to stand;occupation based/functional task  -               User Key  (r) = Recorded By, (t) = Taken By, (c) = Cosigned By      Initials Name Provider Type     Jaye Pinedo, OT Occupational Therapist                   Goals/Plan       Row Name 02/09/24 1322          Transfer Goal 1 (OT)    Activity/Assistive Device (Transfer Goal 1, OT) bed-to-chair/chair-to-bed;toilet  -     [place] Level/Cues Needed (Transfer Goal 1, OT) standby assist  -MC     Time Frame (Transfer Goal 1, OT) long term goal (LTG);10 days  -     Progress/Outcome (Transfer Goal 1, OT) goal ongoing  -       Row Name 02/09/24 1322          Dressing Goal 1 (OT)    Activity/Device (Dressing Goal 1, OT) lower body dressing  don/doff socks/pants w/ AAD  -     [place]/Cues Needed (Dressing Goal 1, OT) standby assist  -     Time Frame (Dressing Goal 1, OT) long term goal (LTG);10 days  -     Progress/Outcome (Dressing Goal 1, OT) goal ongoing  -[facility] Name 02/09/24 1322          Grooming Goal 1 (OT)    Activity/Device (Grooming Goal 1, OT) hair care;oral care;wash face, hands  standing sinkside  -     [place] (Grooming Goal 1, OT) standby assist  -MC     Time Frame (Grooming Goal 1, OT) long term goal (LTG);10 days  -     Progress/Outcome (Grooming Goal 1, OT) goal ongoing  -[facility] Name 02/09/24 1322          Therapy Assessment/Plan (OT)    Planned Therapy Interventions (OT) activity tolerance training;adaptive equipment training;BADL retraining;functional balance retraining;IADL retraining;occupation/activity based interventions;patient/caregiver education/training;ROM/therapeutic exercise;strengthening exercise;transfer/mobility retraining  -               User Key  (r) = Recorded By, (t) = Taken By, (c) = Cosigned By      Initials Name Provider Type      Jaye Pinedo, ANTONIO Occupational Therapist                   Clinical Impression       [facility] Name 02/09/24 1320          Pain Assessment    Pretreatment Pain Rating 0/10 - no pain  -     Posttreatment Pain Rating 0/10 - no pain  -MC       Row Name 02/09/24 1320          Plan of Care Review    Plan of Care Reviewed With patient  -     Outcome Evaluation Pt presents w/ generalized weakness, decreased functional endurance, and mild balance deficits limiting his ADL independence. Pt would benefit from continued skilled IPOT services to address current functional deficits and facilitate return to [facility]. Rec home w/ A at d/c.  -[facility] Name 02/09/24 1320          Therapy Assessment/Plan (OT)    Rehab Potential (OT) good, to achieve stated therapy goals  -     Criteria for Skilled Therapeutic Interventions Met (OT) yes;skilled treatment is necessary  -     Therapy Frequency (OT) daily  -[facility] 02/09/24 1320          Therapy Plan Review/Discharge Plan (OT)    Anticipated Discharge Disposition (OT) home with assist  -[facility] 02/09/24 1320          Vital Signs    O2 Delivery Pre Treatment room air  -     O2 Delivery Intra Treatment room air  -     O2 Delivery Post Treatment room air  -     Pre Patient Position Standing  -     Intra Patient Position Sitting  -     Post Patient Position Sitting  -[facility] 02/09/24 1320          Positioning and Restraints    Pre-Treatment Position in bed  -     Post Treatment Position bed  -     In Bed notified nsg;fowlers;call light within reach;encouraged to call for assist;exit alarm on  -               User Key  (r) = Recorded By, (t) = Taken By, (c) = Cosigned By      Initials Name Provider Type     Jaye Pinedo, ANTONIO Occupational Therapist                   Outcome Measures       Row Name 02/09/24 1322          How much help from another is currently needed...    Putting on and taking off regular lower body clothing? 3  -      Bathing (including washing, rinsing, and drying) 3  -MC     Toileting (which includes using toilet bed pan or urinal) 3  -MC     Putting on and taking off regular upper body clothing 4  -MC     Taking care of personal grooming (such as brushing teeth) 4  -MC     Eating meals 4  -     AM-PAC 6 Clicks Score (OT) 21  -       Row Name 02/09/24 0950          How much help from another person do you currently need...    Turning from your back to your side while in flat bed without using bedrails? 4  -NS     Moving from lying on back to sitting on the side of a flat bed without bedrails? 3  -NS     Moving to and from a bed to a chair (including a wheelchair)? 3  -NS     Standing up from a chair using your arms (e.g., wheelchair, bedside chair)? 3  -NS     Climbing 3-5 steps with a railing? 3  -NS     To walk in hospital room? 3  -NS     AM-PAC 6 Clicks Score (PT) 19  -NS     Highest Level of Mobility Goal 6 --> Walk 10 steps or more  -NS       Row Name 02/09/24 1322 02/09/24 0950       Functional Assessment    Outcome Measure Options AM-PAC 6 Clicks Daily Activity (OT)  - AM-PAC 6 Clicks Basic Mobility (PT)  -NS              User Key  (r) = Recorded By, (t) = Taken By, (c) = Cosigned By      Initials Name Provider Type    Yaritza Koch, PT Physical Therapist     Jaye Pinedo OT Occupational Therapist                    Occupational Therapy Education       Title: PT OT SLP Therapies (In Progress)       Topic: Occupational Therapy (In Progress)       Point: ADL training (Done)       Description:   Instruct learner(s) on proper safety adaptation and remediation techniques during self care or transfers.   Instruct in proper use of assistive devices.                  Learning Progress Summary             Patient Acceptance, E, VU by  at 2/9/2024 1323                         Point: Home exercise program (Not Started)       Description:   Instruct learner(s) on appropriate technique for monitoring,  assisting and/or progressing therapeutic exercises/activities.                  Learner Progress:  Not documented in this visit.              Point: Precautions (Done)       Description:   Instruct learner(s) on prescribed precautions during self-care and functional transfers.                  Learning Progress Summary             Patient Acceptance, E, VU by  at 2/9/2024 1323                         Point: Body mechanics (Done)       Description:   Instruct learner(s) on proper positioning and spine alignment during self-care, functional mobility activities and/or exercises.                  Learning Progress Summary             Patient Acceptance, E, VU by  at 2/9/2024 1323                                         User Key       Initials Effective Dates Name Provider Type Discipline     10/14/22 -  Jaye Pinedo, OT Occupational Therapist OT                  OT Recommendation and Plan  Planned Therapy Interventions (OT): activity tolerance training, adaptive equipment training, BADL retraining, functional balance retraining, IADL retraining, occupation/activity based interventions, patient/caregiver education/training, ROM/therapeutic exercise, strengthening exercise, transfer/mobility retraining  Therapy Frequency (OT): daily  Plan of Care Review  Plan of Care Reviewed With: patient  Outcome Evaluation: Pt presents w/ generalized weakness, decreased functional endurance, and mild balance deficits limiting his ADL independence. Pt would benefit from continued skilled IPOT services to address current functional deficits and facilitate return to [facility]. Rec home w/ A at d/c.     Time Calculation:   Evaluation Complexity (OT)  Review Occupational Profile/Medical/Therapy History Complexity: brief/low complexity  Assessment, Occupational Performance/Identification of Deficit Complexity: 1-3 performance deficits  Clinical Decision Making Complexity (OT): problem focused assessment/low complexity  Overall  Complexity of Evaluation (OT): low complexity     Time Calculation- OT       Row Name 02/09/24 1323 02/09/24 0950          Time Calculation- OT    OT Start Time 1015  -MC --     OT Received On 02/09/24  - --     OT Goal Re-Cert Due Date 02/19/24  -MC --        Timed Charges    48575 - Gait Training Minutes  -- 10  -NS     07623 - OT Therapeutic Activity Minutes 3  -MC --     69524 - OT Self Care/Mgmt Minutes 9  -MC --        Untimed Charges    OT Eval/Re-eval Minutes 32  -MC --        Total Minutes    Timed Charges Total Minutes 12  -MC 10  -NS     Untimed Charges Total Minutes 32  -MC --      Total Minutes 44  -MC 10  -NS               User Key  (r) = Recorded By, (t) = Taken By, (c) = Cosigned By      Initials Name Provider Type    Yaritza Koch, PT Physical Therapist     Jaye Pinedo OT Occupational Therapist                  Therapy Charges for Today       Code Description Service Date Service Provider Modifiers Qty    [id number]  OT SELF CARE/MGMT/TRAIN EA 15 MIN 2/9/2024 Jyae Pinedo OT GO 1    [id number]  OT EVAL LOW COMPLEXITY 3 2/9/2024 Jaye Pinedo OT GO 1                 Jaye Pinedo OT  2/9/2024

## 2024-02-09 NOTE — THERAPY EVALUATION
Patient Name: Vinnie Fulton  : 1940    MRN: [id number]                              Today's Date: 2024       Admit Date: 2024    Visit Dx:     ICD-10-CM ICD-9-CM   1. Shortness of breath  R06.02 786.05   2. Anasarca  R60.1 782.3   3. Hypertensive urgency  I16.0 401.9   4. Other ascites  R18.8 789.59   5. Prostate cancer  C61 185   6. Pleural effusion  J90 511.9     Patient Active Problem List   Diagnosis    Paget's disease of bone    Esophageal reflux    Essential hypertension, benign    Allergic rhinitis    Generalized osteoarthrosis, involving multiple sites    Hyperlipidemia    Hypothyroidism    Herpes zoster    Cellulitis and abscess    Hypertensive emergency    Left facial numbness    Prostate cancer    Hypomagnesemia    Type 2 diabetes mellitus    Left-sided Bell's palsy    Tremor of both hands    Shuffling gait    Orthostatic hypotension    Pain of hand    Vertigo    Osteoarthritis    Anasarca     Past Medical History:   Diagnosis Date    Fistula-in-ano     GERD (gastroesophageal reflux disease)     History of radiation therapy     Hyperlipidemia     Hypertension     Hyperthyroidism     Perirectal abscess     Prostate cancer      Past Surgical History:   Procedure Laterality Date    CARPAL TUNNEL RELEASE        General Information       Row Name 2450          Physical Therapy Time and Intention    Document Type evaluation  -NS     Mode of Treatment physical therapy  -NS       Row Name 2450          General Information    Patient Profile Reviewed yes  -NS     Prior Level of Function independent:;gait;all household mobility;community mobility;transfer;bed mobility;ADL's  Pt states he tends to furniture walk at home and uses a rollator for community mobility  -NS     Existing Precautions/Restrictions fall  -NS     Barriers to Rehab medically complex  -NS       Row Name 2450          Living Environment    People in Home spouse  -NS       Row Name 2458           Home Main Entrance    Number of Stairs, Main Entrance one  -NS       Row Name 02/09/24 0950          Stairs Within Home, Primary    Number of Stairs, Within Home, Primary none  -NS       Row Name 02/09/24 0950          Cognition    Orientation Status (Cognition) oriented x 4  -NS       Row Name 02/09/24 0950          Safety Issues, Functional Mobility    Impairments Affecting Function (Mobility) balance;strength;endurance/activity tolerance  -NS               User Key  (r) = Recorded By, (t) = Taken By, (c) = Cosigned By      Initials Name Provider Type    Yaritza Koch, ANA CRISTINA Physical Therapist                   Mobility       Row Name 02/09/24 0950          Bed Mobility    Bed Mobility sit-supine  -NS     Sit-Supine [place] (Bed Mobility) modified independence  -NS     Assistive Device (Bed Mobility) head of bed elevated  -NS       Row Name 02/09/24 0950          Sit-Stand Transfer    Sit-Stand [place] (Transfers) standby assist  -NS     Assistive Device (Sit-Stand Transfers) walker, front-wheeled  -NS       Row Name 02/09/24 0950          Gait/Stairs (Locomotion)    [place] Level (Gait) contact guard  -NS     Assistive Device (Gait) walker, front-wheeled  -NS     Distance in Feet (Gait) 320  -NS     Deviations/Abnormal Patterns (Gait) teresita decreased;gait speed decreased;stride length decreased  -NS     Bilateral Gait Deviations heel strike decreased;forward flexed posture  -NS     Comment, (Gait/Stairs) Patient ambulated at slow pace, demonstrating decreased heel strike. He has a few moments of mild unsteadiness during gait with head turns that improved with practice. Cues for staying withing RW for safety during turns.  -NS               User Key  (r) = Recorded By, (t) = Taken By, (c) = Cosigned By      Initials Name Provider Type    Yaritza Koch, ANA CRISTINA Physical Therapist                   Obj/Interventions       Row Name 02/09/24 0950          Range of Motion Comprehensive    General Range  of Motion bilateral lower extremity ROM WFL  -NS       Row Name 02/09/24 0950          Strength Comprehensive (MMT)    General Manual Muscle Testing (MMT) Assessment lower extremity strength deficits identified  -NS     Comment, General Manual Muscle Testing (MMT) Assessment B knee ext: 4+/5, B hip flexion: 4/5  -NS       Row Name 02/09/24 0950          Balance    Balance Assessment sitting static balance;sitting dynamic balance;standing static balance;standing dynamic balance  -NS     Static Sitting Balance independent  -NS     Dynamic Sitting Balance independent  -NS     Position, Sitting Balance unsupported;sitting edge of bed  -NS     Static Standing Balance standby assist  -NS     Dynamic Standing Balance contact guard  -NS     Position/Device Used, Standing Balance supported;walker, front-wheeled  -NS       Row Name 02/09/24 0950          Sensory Assessment (Somatosensory)    Sensory Assessment (Somatosensory) LE sensation intact  -NS               User Key  (r) = Recorded By, (t) = Taken By, (c) = Cosigned By      Initials Name Provider Type    Yaritza Koch, PT Physical Therapist                   Goals/Plan       Row Name 02/09/24 0950          Bed Mobility Goal 1 (PT)    Activity/Assistive Device (Bed Mobility Goal 1, PT) supine to sit  -NS     [place] Level/Cues Needed (Bed Mobility Goal 1, PT) independent  -NS     Time Frame (Bed Mobility Goal 1, PT) long term goal (LTG);2 weeks  -NS       [facility] Name 02/09/24 0950          Transfer Goal 1 (PT)    Activity/Assistive Device (Transfer Goal 1, PT) sit-to-stand/stand-to-sit;bed-to-chair/chair-to-bed  -NS     [place] Level/Cues Needed (Transfer Goal 1, PT) independent  -NS     Time Frame (Transfer Goal 1, PT) long term goal (LTG);2 weeks  -NS       [facility] Name 02/09/24 0950          Gait Training Goal 1 (PT)    Activity/Assistive Device (Gait Training Goal 1, PT) gait (walking locomotion);assistive device use  -NS     [place] Level (Gait Training  Goal 1, PT) modified independence  -NS     Distance (Gait Training Goal 1, PT) 420  -NS     Time Frame (Gait Training Goal 1, PT) long term goal (LTG);2 weeks  -NS       [facility] Name 02/09/24 0950          Stairs Goal 1 (PT)    Activity/Assistive Device (Stairs Goal 1, PT) ascending stairs;descending stairs  -NS     [place] Level/Cues Needed (Stairs Goal 1, PT) standby assist  -NS     Number of Stairs (Stairs Goal 1, PT) 1  -NS     Time Frame (Stairs Goal 1, PT) long term goal (LTG);2 weeks  -NS       [facility] Name 02/09/24 0950          Therapy Assessment/Plan (PT)    Planned Therapy Interventions (PT) balance training;bed mobility training;gait training;home exercise program;patient/family education;strengthening;stair training;neuromuscular re-education;transfer training  -NS               User Key  (r) = Recorded By, (t) = Taken By, (c) = Cosigned By      Initials Name Provider Type    Yaritza Koch, PT Physical Therapist                   Clinical Impression       Row Name 02/09/24 0950          Pain    Pretreatment Pain Rating 0/10 - no pain  -NS     Posttreatment Pain Rating 0/10 - no pain  -NS       Row Name 02/09/24 0950          Plan of Care Review    Plan of Care Reviewed With patient  -NS     Progress no change  -NS     Outcome Evaluation Patient presents with mild deficits in strength, balance, and activity tolerance compared to baseline. He benefitted from cues for safety during ambulation with use of RW. Skilled IP PT services warranted to address deficits and support return to baseline. Recommend home with assist at discharge.  -NS       Row Name 02/09/24 0950          Therapy Assessment/Plan (PT)    Patient/Family Therapy Goals Statement (PT) return to independence  -NS     Rehab Potential (PT) good, to achieve stated therapy goals  -NS     Criteria for Skilled Interventions Met (PT) yes;meets criteria;skilled treatment is necessary  -NS     Therapy Frequency (PT) daily  -NS       Row Name 02/09/24  0950          Vital Signs    Pre Systolic BP Rehab 120  -NS     Pre Treatment Diastolic BP 57  -NS     Post Systolic BP Rehab 160  -NS     Post Treatment Diastolic BP 99  -NS     Pretreatment Heart Rate (beats/min) 71  -NS     Posttreatment Heart Rate (beats/min) 60  -NS     O2 Delivery Pre Treatment room air  -NS     Post SpO2 (%) 93  -NS     O2 Delivery Post Treatment room air  -NS     Pre Patient Position Sitting  -NS     Intra Patient Position Standing  -NS     Post Patient Position Supine  -NS       Row Name 02/09/24 0950          Positioning and Restraints    Pre-Treatment Position in bed  -NS     Post Treatment Position bed  -NS     In Bed notified nsg;supine;call light within reach;encouraged to call for assist;exit alarm on;side rails up x3;with family/caregiver;with nsg  -NS               User Key  (r) = Recorded By, (t) = Taken By, (c) = Cosigned By      Initials Name Provider Type    Yaritza Koch, ANA CRISTINA Physical Therapist                   Outcome Measures       Row Name 02/09/24 0950          How much help from another person do you currently need...    Turning from your back to your side while in flat bed without using bedrails? 4  -NS     Moving from lying on back to sitting on the side of a flat bed without bedrails? 3  -NS     Moving to and from a bed to a chair (including a wheelchair)? 3  -NS     Standing up from a chair using your arms (e.g., wheelchair, bedside chair)? 3  -NS     Climbing 3-5 steps with a railing? 3  -NS     To walk in hospital room? 3  -NS     AM-PAC 6 Clicks Score (PT) 19  -NS     Highest Level of Mobility Goal 6 --> Walk 10 steps or more  -NS       Row Name 02/09/24 0950          Functional Assessment    Outcome Measure Options AM-PAC 6 Clicks Basic Mobility (PT)  -NS               User Key  (r) = Recorded By, (t) = Taken By, (c) = Cosigned By      Initials Name Provider Type    Yaritza Koch PT Physical Therapist                                 Physical Therapy  Education       Title: PT OT SLP Therapies (In Progress)       Topic: Physical Therapy (In Progress)       Point: Mobility training (Done)       Learning Progress Summary             Patient Acceptance, E, VU by NS at 2/9/2024 1101                         Point: Home exercise program (Not Started)       Learner Progress:  Not documented in this visit.              Point: Body mechanics (Done)       Learning Progress Summary             Patient Acceptance, E, VU by NS at 2/9/2024 1101                         Point: Precautions (Done)       Learning Progress Summary             Patient Acceptance, E, VU by NS at 2/9/2024 1101                                         User Key       Initials Effective Dates Name Provider Type Discipline    NS 06/16/21 -  Yaritza Santos, ANA CRISTINA Physical Therapist PT                  PT Recommendation and Plan  Planned Therapy Interventions (PT): balance training, bed mobility training, gait training, home exercise program, patient/family education, strengthening, stair training, neuromuscular re-education, transfer training  Plan of Care Reviewed With: patient  Progress: no change  Outcome Evaluation: Patient presents with mild deficits in strength, balance, and activity tolerance compared to baseline. He benefitted from cues for safety during ambulation with use of RW. Skilled IP PT services warranted to address deficits and support return to baseline. Recommend home with assist at discharge.     Time Calculation:   PT Evaluation Complexity  History, PT Evaluation Complexity: 3 or more personal factors and/or comorbidities  Examination of Body Systems (PT Eval Complexity): total of 4 or more elements  Clinical Presentation (PT Evaluation Complexity): stable  Clinical Decision Making (PT Evaluation Complexity): low complexity  Overall Complexity (PT Evaluation Complexity): low complexity     PT Charges       Row Name 02/09/24 0950             Time Calculation    Start Time 0950  -NS      PT  Received On 02/09/24  -NS      PT Goal Re-Cert Due Date 02/19/24  -NS         Timed Charges    49875 - Gait Training Minutes  10  -NS         Untimed Charges    PT Eval/Re-eval Minutes 37  -NS         Total Minutes    Timed Charges Total Minutes 10  -NS      Untimed Charges Total Minutes 37  -NS       Total Minutes 47  -NS                User Key  (r) = Recorded By, (t) = Taken By, (c) = Cosigned By      Initials Name Provider Type    Yaritza Koch, PT Physical Therapist                  Therapy Charges for Today       Code Description Service Date Service Provider Modifiers Qty    52403059209 HC GAIT TRAINING EA 15 MIN 2/9/2024 Yaritza Santos, PT GP 1    73003995817 HC PT EVAL LOW COMPLEXITY 3 2/9/2024 Yaritza Santos, PT GP 1            PT G-Codes  Outcome Measure Options: AM-PAC 6 Clicks Basic Mobility (PT)  AM-PAC 6 Clicks Score (PT): 19  PT Discharge Summary  Anticipated Discharge Disposition (PT): home with assist    Yaritza Santos PT  2/9/2024

## 2024-02-09 NOTE — CASE MANAGEMENT/SOCIAL WORK
Discharge Planning Assessment  [facility]     Patient Name: Vinnie Fulton  MRN: [id number]  Today's Date: 2/9/2024    Admit Date: 2/8/2024    Plan: Home with family   Discharge Needs Assessment       Row Name 02/09/24 1020       Living Environment    People in Home spouse    Name(s) of People in Home Jimmy Nunn    Current Living Arrangements home    Potentially Unsafe Housing Conditions none    Primary Care Provided by self    Provides Primary Care For no one    Family Caregiver if Needed spouse    Family Caregiver Names Wife- Arline    Quality of Family Relationships helpful;supportive    Able to Return to Prior Arrangements yes       Resource/Environmental Concerns    Resource/Environmental Concerns none       Transportation Needs    In the past 12 months, has lack of transportation kept you from medical appointments or from getting medications? no    In the past 12 months, has lack of transportation kept you from meetings, work, or from getting things needed for daily living? No       Food Insecurity    Within the past 12 months, you worried that your food would run out before you got the money to buy more. Never true    Within the past 12 months, the food you bought just didn't last and you didn't have money to get more. Never true       Transition Planning    Patient/Family Anticipates Transition to home with family    Patient/Family Anticipated Services at Transition none    Transportation Anticipated family or friend will provide       Discharge Needs Assessment    Readmission Within the Last 30 Days no previous admission in last 30 days    Equipment Currently Used at Home cane, straight;walker, rolling;bp cuff    Concerns to be Addressed no discharge needs identified;denies needs/concerns at this time    Anticipated Changes Related to Illness none    Equipment Needed After Discharge none    Discharge Coordination/Progress Pt lives in [place] with his wife.  Pt uses a rolling walker and cane when  ambulating, but is independent in the community.  He didn't require OP or HH services.  Pt's wife is able to provide transportation at discharge.  Pt denies having any discharge needs.                   Discharge Plan       Row Name 02/09/24 1026       Plan    Plan Home with family    Patient/Family in Agreement with Plan yes    Plan Comments SW met with pt at bedside.  Pt stated he is feeling much better today; both breathing and swelling are improved.  Pt plans to return home at discharge.  He denies having any discharge needs.  IMM was given to pt at beside.    Final Discharge Disposition Code 01 - home or self-care                  Continued Care and Services - Admitted Since 2/8/2024    Coordination has not been started for this encounter.       Expected Discharge Date and Time       Expected Discharge Date Expected Discharge Time    Feb 10, 2024            Demographic Summary       Row Name 02/09/24 1015       General Information    Admission Type inpatient    Referral Source nursing    Reason for Consult discharge planning    Preferred Language English    General Information Comments PCP is Ivan Yoder                   Functional Status       Row Name 02/09/24 1019       Functional Status, IADL    IADL Comments Pt said he is independent with a rolling walker at home.       Employment/    Employment/ Comments Pt has insurance and prescription coverage through [employer] and [employer]                   Psychosocial    No documentation.                  Abuse/Neglect    No documentation.                  Legal    No documentation.                  Substance Abuse    No documentation.                  Patient Forms    No documentation.                     ANABELA Go

## 2024-02-10 ENCOUNTER — READMISSION MANAGEMENT (OUTPATIENT)
Dept: CALL CENTER | Facility: HOSPITAL | Age: 84
End: 2024-02-10
Payer: COMMERCIAL

## 2024-02-10 VITALS
HEART RATE: 64 BPM | TEMPERATURE: 97.7 F | DIASTOLIC BLOOD PRESSURE: 93 MMHG | BODY MASS INDEX: 28.08 KG/M2 | HEIGHT: 71 IN | OXYGEN SATURATION: 91 % | WEIGHT: 200.6 LBS | SYSTOLIC BLOOD PRESSURE: 151 MMHG | RESPIRATION RATE: 18 BRPM

## 2024-02-10 LAB
ANION GAP SERPL CALCULATED.3IONS-SCNC: 11 MMOL/L (ref 5–15)
BH CV ECHO MEAS - AO MAX PG: 3.6 MMHG
BH CV ECHO MEAS - AO MEAN PG: 2.12 MMHG
BH CV ECHO MEAS - AO ROOT DIAM: 3.5 CM
BH CV ECHO MEAS - AO V2 MAX: 94.6 CM/SEC
BH CV ECHO MEAS - AO V2 VTI: 18.8 CM
BH CV ECHO MEAS - AVA(I,D): 2.47 CM2
BH CV ECHO MEAS - EDV(CUBED): 127.5 ML
BH CV ECHO MEAS - EDV(MOD-SP2): 110 ML
BH CV ECHO MEAS - EDV(MOD-SP4): 121 ML
BH CV ECHO MEAS - EF(MOD-BP): 44.8 %
BH CV ECHO MEAS - EF(MOD-SP2): 42.5 %
BH CV ECHO MEAS - EF(MOD-SP4): 50.3 %
BH CV ECHO MEAS - ESV(CUBED): 67.6 ML
BH CV ECHO MEAS - ESV(MOD-SP2): 63.2 ML
BH CV ECHO MEAS - ESV(MOD-SP4): 60.1 ML
BH CV ECHO MEAS - FS: 19.1 %
BH CV ECHO MEAS - IVS/LVPW: 0.99 CM
BH CV ECHO MEAS - IVSD: 0.99 CM
BH CV ECHO MEAS - LA DIMENSION: 5.7 CM
BH CV ECHO MEAS - LAT PEAK E' VEL: 7.8 CM/SEC
BH CV ECHO MEAS - LV MASS(C)D: 181.7 GRAMS
BH CV ECHO MEAS - LV MAX PG: 2.09 MMHG
BH CV ECHO MEAS - LV MEAN PG: 1.14 MMHG
BH CV ECHO MEAS - LV V1 MAX: 72.2 CM/SEC
BH CV ECHO MEAS - LV V1 VTI: 14.7 CM
BH CV ECHO MEAS - LVIDD: 5 CM
BH CV ECHO MEAS - LVIDS: 4.1 CM
BH CV ECHO MEAS - LVOT AREA: 3.2 CM2
BH CV ECHO MEAS - LVOT DIAM: 2 CM
BH CV ECHO MEAS - LVPWD: 1 CM
BH CV ECHO MEAS - MED PEAK E' VEL: 4.4 CM/SEC
BH CV ECHO MEAS - MV A MAX VEL: 37.2 CM/SEC
BH CV ECHO MEAS - MV DEC SLOPE: 176.9 CM/SEC2
BH CV ECHO MEAS - MV DEC TIME: 0.4 SEC
BH CV ECHO MEAS - MV E MAX VEL: 60.2 CM/SEC
BH CV ECHO MEAS - MV E/A: 1.62
BH CV ECHO MEAS - MV MAX PG: 1.43 MMHG
BH CV ECHO MEAS - MV MEAN PG: 0.77 MMHG
BH CV ECHO MEAS - MV P1/2T: 106.6 MSEC
BH CV ECHO MEAS - MV V2 VTI: 17.7 CM
BH CV ECHO MEAS - MVA(P1/2T): 2.06 CM2
BH CV ECHO MEAS - MVA(VTI): 2.6 CM2
BH CV ECHO MEAS - PA ACC TIME: 0.08 SEC
BH CV ECHO MEAS - RAP SYSTOLE: 10 MMHG
BH CV ECHO MEAS - RVSP: 40 MMHG
BH CV ECHO MEAS - SV(LVOT): 46.5 ML
BH CV ECHO MEAS - SV(MOD-SP2): 46.8 ML
BH CV ECHO MEAS - SV(MOD-SP4): 60.9 ML
BH CV ECHO MEAS - TAPSE (>1.6): 1.88 CM
BH CV ECHO MEAS - TR MAX PG: 29.8 MMHG
BH CV ECHO MEAS - TR MAX VEL: 273 CM/SEC
BH CV ECHO MEASUREMENTS AVERAGE E/E' RATIO: 9.87
BH CV VAS BP RIGHT ARM: NORMAL MMHG
BH CV XLRA - RV BASE: 4.5 CM
BH CV XLRA - RV LENGTH: 6.8 CM
BH CV XLRA - RV MID: 3.4 CM
BH CV XLRA - TDI S': 10.4 CM/SEC
BUN SERPL-MCNC: 20 MG/DL (ref 8–23)
BUN/CREAT SERPL: 16 (ref 7–25)
CALCIUM SPEC-SCNC: 9.2 MG/DL (ref 8.6–10.5)
CHLORIDE SERPL-SCNC: 88 MMOL/L (ref 98–107)
CO2 SERPL-SCNC: 39 MMOL/L (ref 22–29)
CREAT SERPL-MCNC: 1.25 MG/DL (ref 0.76–1.27)
DEPRECATED RDW RBC AUTO: 49.5 FL (ref 37–54)
EGFRCR SERPLBLD CKD-EPI 2021: 57.1 ML/MIN/1.73
ERYTHROCYTE [DISTWIDTH] IN BLOOD BY AUTOMATED COUNT: 15.7 % (ref 12.3–15.4)
GLUCOSE SERPL-MCNC: 113 MG/DL (ref 65–99)
HCT VFR BLD AUTO: 40.5 % (ref 37.5–51)
HGB BLD-MCNC: 13.3 G/DL (ref 13–17.7)
LEFT ATRIUM VOLUME INDEX: 49.3 ML/M2
LV EF 2D ECHO EST: 50 %
MCH RBC QN AUTO: 28.5 PG (ref 26.6–33)
MCHC RBC AUTO-ENTMCNC: 32.8 G/DL (ref 31.5–35.7)
MCV RBC AUTO: 86.9 FL (ref 79–97)
PLATELET # BLD AUTO: 122 10*3/MM3 (ref 140–450)
PMV BLD AUTO: 10.6 FL (ref 6–12)
POTASSIUM SERPL-SCNC: 3.5 MMOL/L (ref 3.5–5.2)
RBC # BLD AUTO: 4.66 10*6/MM3 (ref 4.14–5.8)
SODIUM SERPL-SCNC: 138 MMOL/L (ref 136–145)
WBC NRBC COR # BLD AUTO: 6.14 10*3/MM3 (ref 3.4–10.8)

## 2024-02-10 PROCEDURE — 85027 COMPLETE CBC AUTOMATED: CPT | Performed by: INTERNAL MEDICINE

## 2024-02-10 PROCEDURE — 99232 SBSQ HOSP IP/OBS MODERATE 35: CPT | Performed by: PHYSICIAN ASSISTANT

## 2024-02-10 PROCEDURE — 80048 BASIC METABOLIC PNL TOTAL CA: CPT | Performed by: INTERNAL MEDICINE

## 2024-02-10 PROCEDURE — 99239 HOSP IP/OBS DSCHRG MGMT >30: CPT | Performed by: NURSE PRACTITIONER

## 2024-02-10 RX ORDER — FUROSEMIDE 40 MG/1
40 TABLET ORAL DAILY
Qty: 30 TABLET | Refills: 0 | Status: SHIPPED | OUTPATIENT
Start: 2024-02-11

## 2024-02-10 RX ORDER — FUROSEMIDE 40 MG/1
40 TABLET ORAL DAILY
Status: DISCONTINUED | OUTPATIENT
Start: 2024-02-10 | End: 2024-02-10 | Stop reason: HOSPADM

## 2024-02-10 RX ADMIN — PANTOPRAZOLE SODIUM 40 MG: 40 TABLET, DELAYED RELEASE ORAL at 06:42

## 2024-02-10 RX ADMIN — FUROSEMIDE 40 MG: 40 TABLET ORAL at 09:39

## 2024-02-10 RX ADMIN — SPIRONOLACTONE 25 MG: 25 TABLET ORAL at 09:39

## 2024-02-10 RX ADMIN — ASPIRIN 81 MG: 81 TABLET, COATED ORAL at 09:38

## 2024-02-10 RX ADMIN — METOPROLOL SUCCINATE 25 MG: 25 TABLET, EXTENDED RELEASE ORAL at 09:39

## 2024-02-10 RX ADMIN — SACUBITRIL AND VALSARTAN 1 TABLET: 49; 51 TABLET, FILM COATED ORAL at 09:38

## 2024-02-10 RX ADMIN — EMPAGLIFLOZIN 10 MG: 10 TABLET, FILM COATED ORAL at 09:39

## 2024-02-10 RX ADMIN — Medication 10 ML: at 09:41

## 2024-02-10 RX ADMIN — LEVOTHYROXINE SODIUM 150 MCG: 0.15 TABLET ORAL at 06:42

## 2024-02-10 RX ADMIN — SERTRALINE HYDROCHLORIDE 50 MG: 50 TABLET ORAL at 09:38

## 2024-02-10 RX ADMIN — FLECAINIDE ACETATE 50 MG: 50 TABLET ORAL at 09:39

## 2024-02-10 NOTE — OUTREACH NOTE
Prep Survey      Flowsheet Row Responses   [facility] patient discharged from? [facility]   Is LACE score < 7 ? No   Eligibility [facility]   Date of Admission 02/08/24   Date of Discharge 02/10/24   Discharge Disposition Home or Self Care   Discharge diagnosis Anasarca   Does the patient have one of the following disease processes/diagnoses(primary or secondary)? Other   Does the patient have Home health ordered? No   Is there a DME ordered? No   Comments regarding appointments [facility] [place] appt on 2/16/24 at 0900am.  f/u appt with Dr. Ever Diaz on 2/12/24   Medication alerts for this patient see AVS   Prep survey completed? Yes            Jessa SEALS - Registered Nurse

## 2024-02-10 NOTE — DISCHARGE SUMMARY
[facility] Medicine Services  DISCHARGE SUMMARY    Patient Name: Vinnie Fulton  : 1940  MRN: [id number]    Date of Admission: 2024 11:09 AM  Date of Discharge:  02/10/24    Primary Care Physician: Ivan Yoder MD    Consults       Date and Time Order Name Status Description    2024  7:26 PM Inpatient Cardiology Consult Completed             Hospital Course     Presenting Problem: Anasarca     Active Hospital Problems    Diagnosis  POA    **Anasarca [R60.1]  Yes    Moderate malnutrition [E44.0]  Yes    Hypertensive emergency [I16.1]  Yes    Type 2 diabetes mellitus [E11.9]  Yes    Prostate cancer [C61]  Yes    Essential hypertension, benign [I10]  Yes    Hypothyroidism [E03.9]  Yes      Resolved Hospital Problems   No resolved problems to display.      Hospital Course:  Vinnie Fulton is a 83 y.o. male with medical history significant for paroxysmal Afib on xarelto, prostate cancer ([facility]) who presented with dyspnea on exertion and lower extremity swelling.  Echo revealed EF of 50% with trace mitral regurg and mild tricuspid regurg.  Cardiologist Dr. Diaz was consulted and assisted with diuresis.  He is -8 L since admission.  He will follow-up with Dr. Diaz as scheduled on 2024.  He will be discharged home today in stable condition     This patient's problems and plans were partially entered by my partner and updated as appropriate by me 02/10/24.    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 Diaz on 2/12/2024 as scheduled.  Follow up with PCP in 1 week.   Follow up with oncology at [facility] next available.     Day of Discharge     HPI:   Patient seen resting in bed no apparent distress.  No acute events overnight per nursing.  He is currently feeling well and denies any complaints at this time.  States that he has not felt this well in weeks.  He is looking forward to going home.  We discussed the importance of taking all medications as prescribed and keeping all recommended follow-up appointments.  He expressed no additional concerns at this time.    Vital Signs:   Temp:  [97.7 °F (36.5 °C)-98 °F (36.7 °C)] 97.9 °F (36.6 °C)  Heart Rate:  [46-69] 64  Resp:  [18] 18  BP: (117-140)/(72-91) 117/72      Physical Exam:  Constitutional: No acute distress, awake, alert  HENT: NCAT, mucous membranes moist  Respiratory: Clear to auscultation bilaterally, respiratory effort normal on room air  Cardiovascular: RRR, no murmurs, palpable pedal pulses bilaterally, cap refill brisk   Gastrointestinal: Positive bowel sounds, soft, nontender, nondistended  Musculoskeletal: Trace BLE edema   Psychiatric: Appropriate affect, cooperative  Neurologic: Oriented x 3, moves all extremities, speech clear  Skin: warm, dry, no visible rash       Pertinent  and/or Most Recent Results     LAB RESULTS:      Lab 02/10/24  0356 02/09/24  0319 02/08/24  1141   WBC 6.14 5.11 4.61   HEMOGLOBIN 13.3 12.0* 12.0*   HEMATOCRIT 40.5 35.8* 37.1*   PLATELETS 122* 119* 106*   NEUTROS ABS  --  3.94 3.88   IMMATURE GRANS (ABS)  --  0.02 0.01   LYMPHS ABS  --  0.43* 0.36*   MONOS ABS  --  0.49 0.25   EOS ABS  --  0.18 0.08   MCV 86.9 86.7 89.2         Lab 02/10/24  0356 02/09/24  0319 02/08/24  1141   SODIUM 138 140 135*   POTASSIUM 3.5 3.7 4.3   CHLORIDE 88* 95* 96*   CO2 39.0* 32.0* 30.0*   ANION GAP 11.0 13.0 9.0   BUN 20 13 16   CREATININE 1.25 1.03 1.01   EGFR 57.1* 72.1 73.8   GLUCOSE 113* 94 136*    CALCIUM 9.2 8.5* 8.4*         Lab 02/09/24  0319 02/08/24  1141   TOTAL PROTEIN 6.4 6.8   ALBUMIN 4.2 4.5   GLOBULIN 2.2 2.3   ALT (SGPT) 24 29   AST (SGOT) 23 25   BILIRUBIN 0.9 0.8   ALK PHOS 70 73         Lab 02/08/24  2210 02/08/24  2000 02/08/24  1141   PROBNP  --   --  3,498.0*   HSTROP T 26* 26* 22*                 Brief Urine Lab Results  (Last result in the past 365 days)        Color   Clarity   Blood   Leuk Est   Nitrite   Protein   CREAT   Urine HCG        02/09/24 1036 Yellow   Clear   Large (3+)   Negative   Negative   Negative                 Microbiology Results (last 10 days)       ** No results found for the last 240 hours. **            Adult Transthoracic Echo Complete w/ Color, Spectral and Contrast if necessary per protocol    Result Date: 2/10/2024    Left ventricular systolic function is low normal. Estimated left ventricular EF = 50%   The right ventricular cavity is mildly dilated.   The left atrial cavity is mildly dilated.   Trace mitral regurgitation.   Mild tricuspid regurgitation with RVSP 40 mmHg.     CT Angiogram Chest Pulmonary Embolism    Result Date: 2/9/2024  CT ANGIOGRAM CHEST PULMONARY EMBOLISM Date of Exam: 2/9/2024 11:08 AM EST Indication: Pulmonary embolism (PE) suspected, high prob. Comparison: CT chest dated 2/8/2024 Technique: Axial CT images were obtained of the chest after the uneventful intravenous administration of 75 mL Isovue-370 utilizing pulmonary embolism protocol.  Reconstructed coronal and sagittal images were also obtained. Automated exposure control and  iterative construction methods were used. Findings: Pulmonary arteries: Adequate opacification of the pulmonary arteries. No evidence of acute pulmonary embolism. Stable mild prominent appearance of the pulmonary arteries Lungs and Pleura: There are mild bibasilar atelectasis. Mild diffuse bilateral patchy groundglass opacities with air trapping. Small right pleural effusion Mediastinum/Peyton: No definite  pathologic mediastinal or hilar lymphadenopathy. Calcified mediastinal and hilar lymph nodes from previous granulomatous infection Lymph nodes: No axillary or supraclavicular adenopathy. Cardiovascular: The cardiac chambers are within normal limits. The pericardium is normal. The aorta and its arch branch vessels are unremarkable.  Upper Abdomen: There is minimal nodularity of the liver surface. Mild amount of ascites. There is diffuse hyperdensity within the gallbladder lumen possibly representing vicarious contrast excretion. Cannot exclude associated gallstones or sludge Bones and Soft Tissue: No suspicious osseous lesion.     Impression: 1. No evidence of pulmonary embolism 2. Mild diffuse bilateral groundglass airspace disease with air trapping. Also bibasilar discoid atelectasis 3. Small right pleural effusion 4. Ancillary findings as above Electronically Signed: José Miguel Mendez MD  2/9/2024 11:34 AM EST  Workstation ID: [id number]    CT Chest Without Contrast Diagnostic    Result Date: 2/8/2024  CT CHEST WO CONTRAST DIAGNOSTIC Date of Exam: 2/8/2024 3:44 PM EST Indication: Shortness of breath.  Patient already received contrast load for a CT scan of his abdomen at outside facility this morning.. Comparison: Plain film obtained on the same date Technique: Axial CT images were obtained of the chest without contrast administration.  Reconstructed coronal and sagittal images were also obtained. Automated exposure control and iterative construction methods were used. The subglottic airway is clear. Small right and trace left pleural effusions. Bilateral lobe calcified granulomata. No pneumothorax. Calcified mediastinal lymph nodes consistent with prior bouts of pancreatitis. Left lower lobe calcified granulomata. The  thyroid gland appears atrophic. No adenopathy within the chest. Enlargement of the main pulmonary artery which measures 3.9 cm suggesting pulmonary arterial hypertension. Atheromatous disease of the  coronary vessels. Small volume perihepatic ascites. Colonic diverticulosis. Excretion of IV contrast in the kidneys. Otherwise the visualized upper abdomen is normal. Degenerative changes of the thoracic spine.     Small right and trace left pleural effusions with evidence of prior granulomatous disease. Electronically Signed: Jude Nagy MD  2024 3:52 PM EST  Workstation ID: [id number]    XR Chest 1 View    Result Date: 2024  XR CHEST 1 VW Date of Exam: 2024 11:40 AM EST Indication: SOA triage protocol Comparison: Chest x-ray 10/29/2021 Findings: Mild cardiomegaly. The lungs are grossly clear without focal consolidation. No pleural effusion or pneumothorax.     Impression: No acute cardiopulmonary findings. Electronically Signed: Alfonzo Arreola MD  2024 12:13 PM EST  Workstation ID: [id number]    CT Abdomen Pelvis With Contrast    Result Date: 2024  [place] [address] Patient Name: JUDE RICHARD Patient : 1940 Patient MRN: [id number] Ordering Provider: HARRY GLEASON EXAM DATE: 2024 EXAM: CT A/P WITH CONTRAST CLINICAL INFORMATION: Prostate cancer follow-up. TECHNIQUE: No POC testing for eGFR was performed due to absence of risk factors. Multiple axial CT images of the abdomen and pelvis were obtained after injection of 100 mL Omnipaque 350 (1 x 100 mL bottle of NDC [address]-32). None was wasted and discarded. Bowel was opacified with oral contrast. COMPARISON: PSMA PET CT 2023 FINDINGS ON CT ABDOMEN: LOWER THORAX: Chronic bronchiectatic/fibrotic changes are seen in both lung bases. There is a moderate-sized right pleural effusion covering approximately 10% of the right hemithorax. There is a small left pleural effusion. No obvious cardiac abnormality.       UPPER ABDOMINAL ORGANS: Liver, gallbladder, spleen, pancreas, adrenals and both kidneys are normal.   BOWEL AND MESENTERY: Stomach, small bowel and colon are normal. Mild ascites  is seen. A few small scattered subcentimeter mesenteric lymph nodes are seen. Not significant by size criteria. RETROPERITONEUM: Aorta shows atherosclerotic calcifications with normal aortic caliber. IVC and branches are patent and normal. No retroperitoneal lymphadenopathy. BODY WALL AND MUSCULOSKELETAL STRUCTURES: Degenerative changes of the lumbar spine are noted. Anterior abdominal wall is normal. FINDINGS ON CT PELVIS: PELVIC CAVITY: Urinary bladder and rectosigmoid are normal. Prostate gland is mildly enlarged. Fiducial markers are seen in the prostate gland. Small amount of fluid is seen in the pelvis. Previously seen lymph nodes have decreased in size now measuring 2-6 mm. There are seen in image 274, 504 and 529 of series 202. No new lymphadenopathy is seen. MUSCULOSKELETAL STRUCTURES: Markedly abnormal bone texture is seen in the sacrum and right ilium. It was present in the previous examination also and did not show PSMA uptake. 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 Edema is seen in the subcutaneous tissues of the pelvis. COMBINED IMPRESSION: 1. Decrease in the size of the previously seen pelvic lymph nodes. 2. Development of new bilateral pleural effusions and ascites. Cause and clinical significance are not clear. 3. Development of edema in the subcutaneous tissues of the pelvis. 4. Abnormal bone texture of sacrum and right ilium. It did not show PSMA uptake in the past. It is stable. 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 Interpreted By: Jourdan Fatima MD Electronically Signed By: Jourdan Fatima MD on 2/8/2024 9:47 AM             Results for orders placed during the hospital encounter of 02/08/24    Adult Transthoracic Echo Complete w/ Color, Spectral and Contrast if necessary per protocol    Interpretation Summary    Left ventricular systolic function is low normal. Estimated left ventricular EF = 50%    The right ventricular cavity is mildly dilated.    The left atrial cavity is  mildly dilated.    Trace mitral regurgitation.    Mild tricuspid regurgitation with RVSP 40 mmHg.      Plan for Follow-up of Pending Labs/Results: Follow-up as directed    Discharge Details        Discharge Medications        New Medications        Instructions Start Date   empagliflozin 10 MG tablet tablet  Commonly known as: JARDIANCE   10 mg, Oral, Daily   Start Date: February 11, 2024     furosemide 40 MG tablet  Commonly known as: LASIX   40 mg, Oral, Daily   Start Date: February 11, 2024     sacubitril-valsartan 49-51 MG tablet  Commonly known as: ENTRESTO   1 tablet, Oral, Every 12 Hours Scheduled             Changes to Medications        Instructions Start Date   levothyroxine 150 MCG tablet  Commonly known as: SYNTHROID, LEVOTHROID  What changed: when to take this   150 mcg, Oral, Daily             Continue These Medications        Instructions Start Date   aspirin 81 MG EC tablet   81 mg, Oral, Daily, OTC      doxazosin 4 MG tablet  Commonly known as: CARDURA   1 tablet, Oral, Nightly      flecainide 50 MG tablet  Commonly known as: TAMBOCOR   50 mg, Oral, 2 Times Daily      metFORMIN  MG 24 hr tablet  Commonly known as: GLUCOPHAGE-XR   1,000 mg, Oral, Daily With Breakfast      metoprolol succinate XL 25 MG 24 hr tablet  Commonly known as: TOPROL-XL   25 mg, Oral, Daily      Orgovyx 120 MG tablet tablet  Generic drug: relugolix   120 mg, Oral, Daily      pantoprazole 40 MG EC tablet  Commonly known as: PROTONIX   40 mg, Oral, Daily      rivaroxaban 20 MG tablet  Commonly known as: XARELTO   20 mg, Oral, Daily With Dinner      rosuvastatin 20 MG tablet  Commonly known as: CRESTOR   20 mg, Oral, Nightly      sertraline 50 MG tablet  Commonly known as: ZOLOFT   50 mg, Oral, Daily      spironolactone 25 MG tablet  Commonly known as: ALDACTONE   25 mg, Oral, Every Morning             Stop These Medications      losartan 100 MG tablet  Commonly known as: Cozaar              Allergies   Allergen Reactions     Atorvastatin Myalgia    Lisinopril-Hydrochlorothiazide GI Intolerance    Lortab [Hydrocodone-Acetaminophen]     Tetanus Toxoid, Adsorbed Swelling    Tetanus Toxoids          Discharge Disposition: Home      Diet:  Hospital:  Diet Order   Procedures    Diet: Cardiac Diets, Diabetic Diets; Healthy Heart (2-3 Na+); Consistent Carbohydrate; Texture: Regular Texture (IDDSI 7); Fluid Consistency: Thin (IDDSI 0)       Diet Instructions       Diet: Regular/House Diet, Cardiac Diets, Diabetic Diets; Healthy Heart (2-3 Na+); Thin (IDDSI 0); Consistent Carbohydrate      Discharge Diet:  Regular/House Diet  Cardiac Diets  Diabetic Diets       Cardiac Diet: Healthy Heart (2-3 Na+)    Fluid Consistency: Thin (IDDSI 0)    Diabetic Diet: Consistent Carbohydrate             Activity:  Activity Instructions       Activity as Tolerated                   CODE STATUS:    Code Status and Medical Interventions:   Ordered at: 02/08/24 1706     Level Of Support Discussed With:    Patient     Code Status (Patient has no pulse and is not breathing):    CPR (Attempt to Resuscitate)     Medical Interventions (Patient has pulse or is breathing):    Full Support       Future Appointments   Date Time Provider Department Center   4/11/2024 10:30 AM Ivan Yoder MD [facility]       Additional Instructions for the Follow-ups that You Need to Schedule       Discharge Follow-up with PCP   As directed       Currently Documented PCP:    Ivan Yoder MD    PCP Phone Number:    [phone]     Follow Up Details: Follow up with PCP in 1 week.        Discharge Follow-up with Specified Provider: Follow up with Dr. Diaz on 2/12/2024 as scheduled.   As directed      To: Follow up with Dr. Diaz on 2/12/2024 as scheduled.                      MARIE Crenshaw  02/10/24      Time Spent on Discharge:  I spent  40  minutes on this discharge activity which included: face-to-face encounter with the patient, reviewing  the data in the system, coordination of the care with the nursing staff as well as consultants, documentation, and entering orders.

## 2024-02-10 NOTE — PROGRESS NOTES
[facility]  Progress Note       LOS: 2 days   Patient Care Team:  Ivan Yoder MD as PCP - General  Ivan Yoder MD as PCP - Family Medicine  Ever Diaz MD as Consulting Physician (Cardiology)    Chief Complaint:  follow up anasarca     Subjective     Patient is feeling back to his baseline.  He states his swelling has resolved.  He denies any shortness of breath or chest pain.  He is in normal sinus rhythm.        Review of Systems:   Pertinent positives in HPI, all others reviewed and negative.      Objective       Current Facility-Administered Medications:     acetaminophen (TYLENOL) tablet 650 mg, 650 mg, Oral, Q6H PRN, Marva Enriquez MD, 650 mg at 02/09/24 0903    aspirin EC tablet 81 mg, 81 mg, Oral, Daily, Taya Chaudhary MD, 81 mg at 02/09/24 1021    sennosides-docusate (PERICOLACE) 8.6-50 MG per tablet 2 tablet, 2 tablet, Oral, BID PRN **AND** polyethylene glycol (MIRALAX) packet 17 g, 17 g, Oral, Daily PRN **AND** bisacodyl (DULCOLAX) EC tablet 5 mg, 5 mg, Oral, Daily PRN **AND** bisacodyl (DULCOLAX) suppository 10 mg, 10 mg, Rectal, Daily PRN, Taya Chaudhary MD    dextrose (D50W) (25 g/50 mL) IV injection 25 g, 25 g, Intravenous, Q15 Min PRN, Taya Chaudhary MD    dextrose (GLUTOSE) oral gel 15 g, 15 g, Oral, Q15 Min PRN, Taya Chaudhary MD    empagliflozin (JARDIANCE) tablet 10 mg, 10 mg, Oral, Daily, Chayo Fitzgerald PA, 10 mg at 02/09/24 0904    flecainide (TAMBOCOR) tablet 50 mg, 50 mg, Oral, Q12H, Marva Enriquez MD, 50 mg at 02/09/24 2011    furosemide (LASIX) injection 40 mg, 40 mg, Intravenous, BID, Marva Enriquez MD, 40 mg at 02/09/24 1916    glucagon (GLUCAGEN) injection 1 mg, 1 mg, Intramuscular, Q15 Min PRN, Taya Chaudhary MD    levothyroxine (SYNTHROID, LEVOTHROID) tablet 150 mcg, 150 mcg, Oral, QAM AC, Taya Chaudhary MD, 150 mcg at 02/10/24 0642    meclizine (ANTIVERT) tablet 25 mg, 25 mg, Oral, TID PRN, Taya Chaudhary MD     "metoprolol succinate XL (TOPROL-XL) 24 hr tablet 25 mg, 25 mg, Oral, Q24H, Marva Enriquez MD, 25 mg at 02/09/24 0906    pantoprazole (PROTONIX) EC tablet 40 mg, 40 mg, Oral, QAM AC, Taya Chaudhary MD, 40 mg at 02/10/24 0642    Pharmacy Consult - [facility], , Does not apply, Daily, Jayesh Radford, [facility]    rivaroxaban (XARELTO) tablet 20 mg, 20 mg, Oral, Daily With Dinner, Taya Chaudhary MD, 20 mg at 02/09/24 1916    rosuvastatin (CRESTOR) tablet 20 mg, 20 mg, Oral, Nightly, Taya Chaudhary MD, 20 mg at 02/09/24 2011    sacubitril-valsartan (ENTRESTO) 49-51 MG tablet 1 tablet, 1 tablet, Oral, Q12H, Chayo Fitzgerald PA, 1 tablet at 02/09/24 2011    sertraline (ZOLOFT) tablet 50 mg, 50 mg, Oral, Daily, Taya Chaudhary MD, 50 mg at 02/09/24 0904    sodium chloride 0.9 % flush 10 mL, 10 mL, Intravenous, PRN, Saul Duarte DO    sodium chloride 0.9 % flush 10 mL, 10 mL, Intravenous, Q12H, Taya Chaudhary MD, 10 mL at 02/09/24 2011    sodium chloride 0.9 % flush 10 mL, 10 mL, Intravenous, PRN, Taya Chaudhary MD    sodium chloride 0.9 % infusion 40 mL, 40 mL, Intravenous, PRN, Taya Chaudhary MD    spironolactone (ALDACTONE) tablet 25 mg, 25 mg, Oral, Daily, Taya Chaudhary MD, 25 mg at 02/09/24 0904    terazosin (HYTRIN) capsule 1 mg, 1 mg, Oral, Nightly, Taya Chaudhary MD, 1 mg at 02/09/24 2011    Vital Sign Min/Max for last 24 hours  Temp  Min: 97.7 °F (36.5 °C)  Max: 98 °F (36.7 °C)   BP  Min: 120/87  Max: 160/99   Pulse  Min: 46  Max: 69   Resp  Min: 18  Max: 18   SpO2  Min: 91 %  Max: 94 %   No data recorded   Weight  Min: 91 kg (200 lb 9.6 oz)  Max: 95.2 kg (209 lb 14.1 oz)     Flowsheet Rows      Flowsheet Row First Filed Value   Admission Height 180.3 cm (71\") Documented at 02/08/2024 1109   Admission Weight 102 kg (225 lb) Documented at 02/08/2024 1109              Intake/Output Summary (Last 24 hours) at 2/10/2024 0828  Last data filed at 2/10/2024 0824  Gross per 24 hour   Intake 1500 ml   Output 4750 ml   Net -3250 ml " "      Physical Exam:     General Appearance:    Alert, cooperative, in no acute distress   Lungs:     Clear to auscultation bilaterally,respirations regular, even and                unlabored    Heart:    Regular rhythm and normal rate, normal S1 and S2,   no        murmur, no gallop, no rub, no click   Chest Wall:    No abnormalities observed   Abdomen:     Normal bowel sounds, no masses, no organomegaly, soft      nontender, nondistended, no guarding, no rebound                tenderness   Extremities:  Moves all extremities well, no edema, no cyanosis, no            redness              Pulses:   Pulses palpable and equal bilaterally   Skin:   No bleeding, bruising or rash        Results Review:   Results from last 7 days   Lab Units 02/10/24  0356 02/09/24  0319 02/08/24  1141   WBC 10*3/mm3 6.14 5.11 4.61   HEMOGLOBIN g/dL 13.3 12.0* 12.0*   HEMATOCRIT % 40.5 35.8* 37.1*   PLATELETS 10*3/mm3 122* 119* 106*     Results from last 7 days   Lab Units 02/10/24  0356 02/09/24  0319 02/08/24  1141   SODIUM mmol/L 138 140 135*   POTASSIUM mmol/L 3.5 3.7 4.3   CHLORIDE mmol/L 88* 95* 96*   CO2 mmol/L 39.0* 32.0* 30.0*   BUN mg/dL 20 13 16   CREATININE mg/dL 1.25 1.03 1.01   GLUCOSE mg/dL 113* 94 136*                  Results from last 7 days   Lab Units 02/08/24  1141   PROBNP pg/mL 3,498.0*         Results from last 7 days   Lab Units 02/08/24  2210 02/08/24 2000 02/08/24  1141   HSTROP T ng/L 26* 26* 22*       Intake/Output Summary (Last 24 hours) at 2/10/2024 0828  Last data filed at 2/10/2024 0824  Gross per 24 hour   Intake 1500 ml   Output 4750 ml   Net -3250 ml       I personally viewed and interpreted the patient's EKG/Telemetry data    EKG: None for review today    Telemetry: Normal sinus rhythm    Ejection Fraction  No results found for: \"EF\"    Echo EF Estimated  Lab Results   Component Value Date    ECHOEFEST 60 10/30/2021         Present on Admission:   Anasarca   Essential hypertension, benign   " Hypothyroidism   Hypertensive emergency   Type 2 diabetes mellitus   Prostate cancer   Moderate malnutrition    Assessment & Plan   Acute on chronic CHF:  -Patient with history of HFpEF, previous EF of 60% in 2021.  Admitted to 8/20/2024 with [facility] after starting treatment for prostate cancer.  He is status post IV Lasix with over 7 L of net output and is feeling back to his baseline.  -Echocardiogram 2/9/2024: EF 50%  - Continue Toprol XL 25 mg daily, added Entresto 49/51 mg twice daily, Jardiance 10 mg daily and Aldactone 25 mg daily.  Transition to Lasix 40 mg p.o. daily at discharge  - Dr. Diaz to consider ischemic eval as outpatient if indicated.     2.  Persistent atrial fibrillation:  -Patient has been in normal sinus rhythm during admission and is on flecainide and Xarelto.        Plan for disposition: The patient is stable for discharge home today.  Follow-up with Dr. Diaz on Monday at 2/12/2024.     Electronically signed by TATY Carmona, 02/10/24, 8:28 AM EST.

## 2024-02-12 ENCOUNTER — TRANSITIONAL CARE MANAGEMENT TELEPHONE ENCOUNTER (OUTPATIENT)
Dept: CALL CENTER | Facility: HOSPITAL | Age: 84
End: 2024-02-12
Payer: COMMERCIAL

## 2024-02-13 ENCOUNTER — LAB (OUTPATIENT)
Dept: LAB | Facility: HOSPITAL | Age: 84
End: 2024-02-13
Payer: MEDICARE

## 2024-02-13 ENCOUNTER — TRANSCRIBE ORDERS (OUTPATIENT)
Dept: LAB | Facility: HOSPITAL | Age: 84
End: 2024-02-13
Payer: COMMERCIAL

## 2024-02-13 DIAGNOSIS — R06.02 SHORTNESS OF BREATH: ICD-10-CM

## 2024-02-13 DIAGNOSIS — I50.9 CHRONIC HEART FAILURE, UNSPECIFIED HEART FAILURE TYPE: Primary | ICD-10-CM

## 2024-02-13 DIAGNOSIS — I50.9 CHRONIC HEART FAILURE, UNSPECIFIED HEART FAILURE TYPE: ICD-10-CM

## 2024-02-13 LAB
ANION GAP SERPL CALCULATED.3IONS-SCNC: 11 MMOL/L (ref 5–15)
BUN SERPL-MCNC: 30 MG/DL (ref 8–23)
BUN/CREAT SERPL: 22.4 (ref 7–25)
CALCIUM SPEC-SCNC: 8.8 MG/DL (ref 8.6–10.5)
CHLORIDE SERPL-SCNC: 96 MMOL/L (ref 98–107)
CO2 SERPL-SCNC: 32 MMOL/L (ref 22–29)
CREAT SERPL-MCNC: 1.34 MG/DL (ref 0.76–1.27)
EGFRCR SERPLBLD CKD-EPI 2021: 52.6 ML/MIN/1.73
GLUCOSE SERPL-MCNC: 128 MG/DL (ref 65–99)
NT-PROBNP SERPL-MCNC: 343 PG/ML (ref 0–1800)
POTASSIUM SERPL-SCNC: 4.1 MMOL/L (ref 3.5–5.2)
SODIUM SERPL-SCNC: 139 MMOL/L (ref 136–145)

## 2024-02-13 PROCEDURE — 36415 COLL VENOUS BLD VENIPUNCTURE: CPT

## 2024-02-13 PROCEDURE — 80048 BASIC METABOLIC PNL TOTAL CA: CPT

## 2024-02-13 PROCEDURE — 83880 ASSAY OF NATRIURETIC PEPTIDE: CPT

## 2024-02-20 ENCOUNTER — READMISSION MANAGEMENT (OUTPATIENT)
Dept: CALL CENTER | Facility: HOSPITAL | Age: 84
End: 2024-02-20
Payer: COMMERCIAL

## 2024-02-20 NOTE — OUTREACH NOTE
Medical Week 2 Survey      Flowsheet Row Responses   [facility] patient discharged from? [place]   Does the patient have one of the following disease processes/diagnoses(primary or secondary)? Other   Week 2 attempt successful? Yes   Call start time 0912   Call end time 0914   List who call center can speak with pt   Medication alerts for this patient lasix   Meds reviewed with patient/caregiver? Yes   Does the patient have all medications ordered at discharge? Yes   Is the patient taking all medications as directed (includes completed medication regime)? Yes   Comments regarding appointments Pt to call pcp office for f/u appointment.   Does the patient have a primary care provider?  Yes   Has the patient kept scheduled appointments due by today? N/A   What is the patient's perception of their health status since discharge? Improving   Week 2 Call Completed? Yes   Wrap up additional comments Pt reports improving nicely. Pt reports weight is down. Pt continues to take lasix. Pt also walking a mile daily.   Call end time 0914            Liberty ANNE - Registered Nurse

## 2024-02-22 DIAGNOSIS — E11.65 TYPE 2 DIABETES MELLITUS WITH HYPERGLYCEMIA, WITHOUT LONG-TERM CURRENT USE OF INSULIN: ICD-10-CM

## 2024-02-22 NOTE — TELEPHONE ENCOUNTER
Caller: Vinnie Fulton    Relationship: Self    Best call back number:      [phone] (Mobile)     Requested Prescriptions:   Requested Prescriptions     Pending Prescriptions Disp Refills    metFORMIN ER (GLUCOPHAGE-XR) 500 MG 24 hr tablet 180 tablet 3     Sig: Take 2 tablets by mouth Daily With Breakfast.      Pharmacy where request should be sent: [facility] #[id number] [place] [address] AT [facility] [phone] [facility] [phone] FX     Last office visit with prescribing clinician: 10/13/2023   Last telemedicine visit with prescribing clinician: Visit date not found   Next office visit with prescribing clinician: 4/11/2024       Does the patient have less than a 3 day supply:  [x] Yes  [] No

## 2024-02-23 RX ORDER — METFORMIN HYDROCHLORIDE 500 MG/1
1000 TABLET, EXTENDED RELEASE ORAL
Qty: 180 TABLET | Refills: 3 | Status: SHIPPED | OUTPATIENT
Start: 2024-02-23

## 2024-02-28 ENCOUNTER — READMISSION MANAGEMENT (OUTPATIENT)
Dept: CALL CENTER | Facility: HOSPITAL | Age: 84
End: 2024-02-28
Payer: COMMERCIAL

## 2024-02-28 NOTE — OUTREACH NOTE
Medical Week 3 Survey      Flowsheet Row Responses   [facility] patient discharged from? [facility]   Does the patient have one of the following disease processes/diagnoses(primary or secondary)? Other   Week 3 attempt successful? Yes   Call start time 1444   Call end time 1446   Discharge diagnosis Genovevasasocorro   Person spoke with today (if not patient) and relationship pt   Meds reviewed with patient/caregiver? Yes   Is the patient having any side effects they believe may be caused by any medication additions or changes? No   Does the patient have all medications ordered at discharge? Yes   Is the patient taking all medications as directed (includes completed medication regime)? Yes   Does the patient have a primary care provider?  Yes   Comments regarding PCP Jignesh Birch,  patient has an upcoming appointment   Does the patient have an appointment with their PCP within 7 days of discharge? Greater than 7 days   What is preventing the patient from scheduling follow up appointments within 7 days of discharge? Earlier appointment not available   Has the patient kept scheduled appointments due by today? Yes   Comments F/U appt with Cardiologist Dr. Diaz 2/13   Psychosocial issues? No   Did the patient receive a copy of their discharge instructions? Yes   Nursing interventions Reviewed instructions with patient   What is the patient's perception of their health status since discharge? Improving   Is the patient/caregiver able to teach back the hierarchy of who to call/visit for symptoms/problems? PCP, Specialist, Home health nurse, Urgent Care, ED, 911 Yes   If the patient is a current smoker, are they able to teach back resources for cessation? Not a smoker   Week 3 Call Completed? Yes   Graduated Yes   Is the patient interested in additional calls from an ambulatory ? No   Would this patient benefit from a Referral to Amb Social Work? No   Wrap up additional comments Pt doing well.  Denies needs.    Call end time [phone]            JOHN JULIAN - Registered Nurse

## 2024-03-11 RX ORDER — FUROSEMIDE 40 MG/1
40 TABLET ORAL DAILY
Qty: 30 TABLET | Refills: 0 | Status: SHIPPED | OUTPATIENT
Start: 2024-03-11

## 2024-03-11 NOTE — TELEPHONE ENCOUNTER
Caller: FultonVinnie    Relationship: Self    Best call back number: [phone]     Requested Prescriptions:   Requested Prescriptions     Pending Prescriptions Disp Refills    furosemide (LASIX) 40 MG tablet 30 tablet 0     Sig: Take 1 tablet by mouth Daily.    empagliflozin (JARDIANCE) 10 MG tablet tablet 30 tablet 0     Sig: Take 1 tablet by mouth Daily.    sacubitril-valsartan (ENTRESTO) 49-51 MG tablet 60 tablet 0     Sig: Take 1 tablet by mouth Every 12 (Twelve) Hours.      Pharmacy where request should be sent: [facility][id number] [place], [place] - [address] AT [facility] [phone] [facility] [phone]      Last office visit with prescribing clinician: 10/13/2023   Last telemedicine visit with prescribing clinician: Visit date not found   Next office visit with prescribing clinician: 4/11/2024     Additional details provided by patient: PATIENT IS COMPLETELY OUT OF THESE MEDICATIONS.     Does the patient have less than a 3 day supply:  [x] Yes  [] No    Would you like a call back once the refill request has been completed: [] Yes [x] No    If the office needs to give you a call back, can they leave a voicemail: [] Yes [x] No    Segundo Sarkar Rep   03/11/24 10:28 EDT

## 2024-03-27 ENCOUNTER — APPOINTMENT (OUTPATIENT)
Dept: GENERAL RADIOLOGY | Facility: HOSPITAL | Age: 84
End: 2024-03-27
Payer: MEDICARE

## 2024-03-27 ENCOUNTER — HOSPITAL ENCOUNTER (EMERGENCY)
Facility: HOSPITAL | Age: 84
Discharge: HOME | End: 2024-03-27
Attending: STUDENT IN AN ORGANIZED HEALTH CARE EDUCATION/TRAINING PROGRAM | Admitting: STUDENT IN AN ORGANIZED HEALTH CARE EDUCATION/TRAINING PROGRAM
Payer: MEDICARE

## 2024-03-27 VITALS
TEMPERATURE: 97.8 F | BODY MASS INDEX: 29.68 KG/M2 | SYSTOLIC BLOOD PRESSURE: 141 MMHG | HEIGHT: 71 IN | RESPIRATION RATE: 18 BRPM | DIASTOLIC BLOOD PRESSURE: 90 MMHG | OXYGEN SATURATION: 97 % | HEART RATE: 57 BPM | WEIGHT: 212 LBS

## 2024-03-27 DIAGNOSIS — E86.1 HYPOTENSION DUE TO HYPOVOLEMIA: Primary | ICD-10-CM

## 2024-03-27 DIAGNOSIS — C61 PROSTATE CANCER: ICD-10-CM

## 2024-03-27 DIAGNOSIS — E83.51 HYPOCALCEMIA: ICD-10-CM

## 2024-03-27 DIAGNOSIS — E83.42 HYPOMAGNESEMIA: ICD-10-CM

## 2024-03-27 LAB
ALBUMIN SERPL-MCNC: 3.9 G/DL (ref 3.5–5.2)
ALBUMIN/GLOB SERPL: 1.7 G/DL
ALP SERPL-CCNC: 56 U/L (ref 39–117)
ALT SERPL W P-5'-P-CCNC: 18 U/L (ref 1–41)
ANION GAP SERPL CALCULATED.3IONS-SCNC: 9 MMOL/L (ref 5–15)
AST SERPL-CCNC: 22 U/L (ref 1–40)
BASOPHILS # BLD AUTO: 0.02 10*3/MM3 (ref 0–0.2)
BASOPHILS NFR BLD AUTO: 0.4 % (ref 0–1.5)
BILIRUB SERPL-MCNC: 0.4 MG/DL (ref 0–1.2)
BILIRUB UR QL STRIP: NEGATIVE
BUN SERPL-MCNC: 27 MG/DL (ref 8–23)
BUN/CREAT SERPL: 24.3 (ref 7–25)
CA-I SERPL ISE-MCNC: 1.02 MMOL/L (ref 1.12–1.32)
CALCIUM SPEC-SCNC: 7.2 MG/DL (ref 8.6–10.5)
CHLORIDE SERPL-SCNC: 100 MMOL/L (ref 98–107)
CLARITY UR: CLEAR
CO2 SERPL-SCNC: 31 MMOL/L (ref 22–29)
COLOR UR: YELLOW
CREAT SERPL-MCNC: 1.11 MG/DL (ref 0.76–1.27)
CRP SERPL-MCNC: <0.3 MG/DL (ref 0–0.5)
DEPRECATED RDW RBC AUTO: 51 FL (ref 37–54)
EGFRCR SERPLBLD CKD-EPI 2021: 65.9 ML/MIN/1.73
EOSINOPHIL # BLD AUTO: 0.17 10*3/MM3 (ref 0–0.4)
EOSINOPHIL NFR BLD AUTO: 3.7 % (ref 0.3–6.2)
ERYTHROCYTE [DISTWIDTH] IN BLOOD BY AUTOMATED COUNT: 15.9 % (ref 12.3–15.4)
GLOBULIN UR ELPH-MCNC: 2.3 GM/DL
GLUCOSE SERPL-MCNC: 149 MG/DL (ref 65–99)
GLUCOSE UR STRIP-MCNC: ABNORMAL MG/DL
HCT VFR BLD AUTO: 35.8 % (ref 37.5–51)
HGB BLD-MCNC: 11.6 G/DL (ref 13–17.7)
HGB UR QL STRIP.AUTO: NEGATIVE
HOLD SPECIMEN: NORMAL
IMM GRANULOCYTES # BLD AUTO: 0.02 10*3/MM3 (ref 0–0.05)
IMM GRANULOCYTES NFR BLD AUTO: 0.4 % (ref 0–0.5)
KETONES UR QL STRIP: NEGATIVE
LEUKOCYTE ESTERASE UR QL STRIP.AUTO: NEGATIVE
LYMPHOCYTES # BLD AUTO: 0.51 10*3/MM3 (ref 0.7–3.1)
LYMPHOCYTES NFR BLD AUTO: 11 % (ref 19.6–45.3)
MAGNESIUM SERPL-MCNC: 1.3 MG/DL (ref 1.6–2.4)
MCH RBC QN AUTO: 28.3 PG (ref 26.6–33)
MCHC RBC AUTO-ENTMCNC: 32.4 G/DL (ref 31.5–35.7)
MCV RBC AUTO: 87.3 FL (ref 79–97)
MONOCYTES # BLD AUTO: 0.36 10*3/MM3 (ref 0.1–0.9)
MONOCYTES NFR BLD AUTO: 7.8 % (ref 5–12)
NEUTROPHILS NFR BLD AUTO: 3.54 10*3/MM3 (ref 1.7–7)
NEUTROPHILS NFR BLD AUTO: 76.7 % (ref 42.7–76)
NITRITE UR QL STRIP: NEGATIVE
NRBC BLD AUTO-RTO: 0 /100 WBC (ref 0–0.2)
NT-PROBNP SERPL-MCNC: 461.8 PG/ML (ref 0–1800)
PH UR STRIP.AUTO: 6.5 [PH] (ref 5–8)
PHOSPHATE SERPL-MCNC: 3.2 MG/DL (ref 2.5–4.5)
PLATELET # BLD AUTO: 124 10*3/MM3 (ref 140–450)
PMV BLD AUTO: 10.2 FL (ref 6–12)
POTASSIUM SERPL-SCNC: 4.1 MMOL/L (ref 3.5–5.2)
PROCALCITONIN SERPL-MCNC: 0.03 NG/ML (ref 0–0.25)
PROT SERPL-MCNC: 6.2 G/DL (ref 6–8.5)
PROT UR QL STRIP: NEGATIVE
QT INTERVAL: 492 MS
QTC INTERVAL: 478 MS
RBC # BLD AUTO: 4.1 10*6/MM3 (ref 4.14–5.8)
SODIUM SERPL-SCNC: 140 MMOL/L (ref 136–145)
SP GR UR STRIP: 1.02 (ref 1–1.03)
T4 FREE SERPL-MCNC: 1.75 NG/DL (ref 0.92–1.68)
TROPONIN T SERPL HS-MCNC: 21 NG/L
TSH SERPL DL<=0.05 MIU/L-ACNC: 0.88 UIU/ML (ref 0.27–4.2)
UROBILINOGEN UR QL STRIP: ABNORMAL
WBC NRBC COR # BLD AUTO: 4.62 10*3/MM3 (ref 3.4–10.8)
WHOLE BLOOD HOLD COAG: NORMAL
WHOLE BLOOD HOLD SPECIMEN: NORMAL

## 2024-03-27 PROCEDURE — 71045 X-RAY EXAM CHEST 1 VIEW: CPT

## 2024-03-27 PROCEDURE — 83735 ASSAY OF MAGNESIUM: CPT | Performed by: STUDENT IN AN ORGANIZED HEALTH CARE EDUCATION/TRAINING PROGRAM

## 2024-03-27 PROCEDURE — 80053 COMPREHEN METABOLIC PANEL: CPT | Performed by: STUDENT IN AN ORGANIZED HEALTH CARE EDUCATION/TRAINING PROGRAM

## 2024-03-27 PROCEDURE — 25810000003 SODIUM CHLORIDE 0.9 % SOLUTION: Performed by: STUDENT IN AN ORGANIZED HEALTH CARE EDUCATION/TRAINING PROGRAM

## 2024-03-27 PROCEDURE — 96361 HYDRATE IV INFUSION ADD-ON: CPT

## 2024-03-27 PROCEDURE — 82330 ASSAY OF CALCIUM: CPT

## 2024-03-27 PROCEDURE — 83880 ASSAY OF NATRIURETIC PEPTIDE: CPT | Performed by: STUDENT IN AN ORGANIZED HEALTH CARE EDUCATION/TRAINING PROGRAM

## 2024-03-27 PROCEDURE — 86140 C-REACTIVE PROTEIN: CPT | Performed by: STUDENT IN AN ORGANIZED HEALTH CARE EDUCATION/TRAINING PROGRAM

## 2024-03-27 PROCEDURE — 84100 ASSAY OF PHOSPHORUS: CPT | Performed by: STUDENT IN AN ORGANIZED HEALTH CARE EDUCATION/TRAINING PROGRAM

## 2024-03-27 PROCEDURE — 36415 COLL VENOUS BLD VENIPUNCTURE: CPT

## 2024-03-27 PROCEDURE — 84439 ASSAY OF FREE THYROXINE: CPT | Performed by: STUDENT IN AN ORGANIZED HEALTH CARE EDUCATION/TRAINING PROGRAM

## 2024-03-27 PROCEDURE — 81003 URINALYSIS AUTO W/O SCOPE: CPT | Performed by: STUDENT IN AN ORGANIZED HEALTH CARE EDUCATION/TRAINING PROGRAM

## 2024-03-27 PROCEDURE — 84145 PROCALCITONIN (PCT): CPT | Performed by: STUDENT IN AN ORGANIZED HEALTH CARE EDUCATION/TRAINING PROGRAM

## 2024-03-27 PROCEDURE — 25010000002 MAGNESIUM SULFATE IN D5W 1G/100ML (PREMIX) 1-5 GM/100ML-% SOLUTION: Performed by: STUDENT IN AN ORGANIZED HEALTH CARE EDUCATION/TRAINING PROGRAM

## 2024-03-27 PROCEDURE — 84484 ASSAY OF TROPONIN QUANT: CPT | Performed by: STUDENT IN AN ORGANIZED HEALTH CARE EDUCATION/TRAINING PROGRAM

## 2024-03-27 PROCEDURE — 84443 ASSAY THYROID STIM HORMONE: CPT | Performed by: STUDENT IN AN ORGANIZED HEALTH CARE EDUCATION/TRAINING PROGRAM

## 2024-03-27 PROCEDURE — 25010000002 CALCIUM GLUCONATE-NACL 1-0.675 GM/50ML-% SOLUTION: Performed by: STUDENT IN AN ORGANIZED HEALTH CARE EDUCATION/TRAINING PROGRAM

## 2024-03-27 PROCEDURE — 93005 ELECTROCARDIOGRAM TRACING: CPT | Performed by: STUDENT IN AN ORGANIZED HEALTH CARE EDUCATION/TRAINING PROGRAM

## 2024-03-27 PROCEDURE — 96368 THER/DIAG CONCURRENT INF: CPT

## 2024-03-27 PROCEDURE — 85025 COMPLETE CBC W/AUTO DIFF WBC: CPT | Performed by: STUDENT IN AN ORGANIZED HEALTH CARE EDUCATION/TRAINING PROGRAM

## 2024-03-27 PROCEDURE — 96365 THER/PROPH/DIAG IV INF INIT: CPT

## 2024-03-27 PROCEDURE — 99284 EMERGENCY DEPT VISIT MOD MDM: CPT

## 2024-03-27 RX ORDER — SODIUM CHLORIDE 0.9 % (FLUSH) 0.9 %
10 SYRINGE (ML) INJECTION AS NEEDED
Status: DISCONTINUED | OUTPATIENT
Start: 2024-03-27 | End: 2024-03-27 | Stop reason: HOSPADM

## 2024-03-27 RX ORDER — CALCIUM GLUCONATE 20 MG/ML
1 INJECTION, SOLUTION INTRAVENOUS ONCE
Status: COMPLETED | OUTPATIENT
Start: 2024-03-27 | End: 2024-03-27

## 2024-03-27 RX ORDER — FUROSEMIDE 40 MG/1
20 TABLET ORAL DAILY
Qty: 30 TABLET | Refills: 0 | Status: SHIPPED | OUTPATIENT
Start: 2024-03-27

## 2024-03-27 RX ORDER — MAGNESIUM SULFATE 1 G/100ML
1 INJECTION INTRAVENOUS ONCE
Status: COMPLETED | OUTPATIENT
Start: 2024-03-27 | End: 2024-03-27

## 2024-03-27 RX ORDER — CALCIUM CARBONATE 500 MG/1
1 TABLET, CHEWABLE ORAL DAILY
Qty: 30 TABLET | Refills: 0 | Status: SHIPPED | OUTPATIENT
Start: 2024-03-27

## 2024-03-27 RX ADMIN — SODIUM CHLORIDE 250 ML: 9 INJECTION, SOLUTION INTRAVENOUS at 13:41

## 2024-03-27 RX ADMIN — MAGNESIUM SULFATE 1 G: 1 INJECTION INTRAVENOUS at 15:05

## 2024-03-27 RX ADMIN — CALCIUM GLUCONATE 1 G: 20 INJECTION, SOLUTION INTRAVENOUS at 15:08

## 2024-03-27 NOTE — DISCHARGE INSTRUCTIONS
Hold your Lasix tomorrow and then start taking a half dose.  Follow closely with your PCP and have electrolytes rechecked.  You can take oral Tums daily for the next few days which should help with the calcium.  While today's workup was reassuring if your symptoms change or worsen please return to the ED or seek other medical care.

## 2024-03-28 NOTE — ED PROVIDER NOTES
EMERGENCY DEPARTMENT ENCOUNTER    Pt Name: Vinnie Fulton  MRN: [id number]  Pt :   1940  Room Number:  15/15  Date of encounter:  3/27/2024  PCP: Ivan Yoder MD  ED Provider: Cesar Sanford MD    Historian: EMS, patient, family      HPI:  Chief Complaint: Diplopia, weakness, hypotension        Context: Vinnie Fulton is a 83-year-old man brought in from [facility] because of hypotension.  Says he has been taking water pills since previous hospital admission and today felt generalized weakness and also had double vision he went to [facility] where he was found to be hypotensive at 75/52 EMS were called they have given him a 400 cc crystalloid bolus with normalization of his blood pressure.  He reports since the fluids his symptoms have resolved as well and he denies any weakness double vision or any other complaints at this time.       PAST MEDICAL HISTORY  Past Medical History:   Diagnosis Date    Fistula-in-ano     GERD (gastroesophageal reflux disease)     History of radiation therapy     Hyperlipidemia     Hypertension     Hyperthyroidism     Perirectal abscess     Prostate cancer          PAST SURGICAL HISTORY  Past Surgical History:   Procedure Laterality Date    CARPAL TUNNEL RELEASE           FAMILY HISTORY  Family History   Problem Relation Age of Onset    Heart disease Mother     Diabetes Mother     Heart disease Father     Diabetes Father     Cancer Father          SOCIAL HISTORY  Social History     Socioeconomic History    Marital status:    Tobacco Use    Smoking status: Former     Current packs/day: 0.00     Average packs/day: 1 pack/day for 1 year (1.0 ttl pk-yrs)     Types: Cigarettes     Start date: 1999     Quit date: 2000     Years since quittin.2    Smokeless tobacco: Former   Vaping Use    Vaping status: Never Used   Substance and Sexual Activity    Alcohol use: No    Drug use: No    Sexual activity: Not Currently          ALLERGIES  Atorvastatin; Lisinopril-hydrochlorothiazide; Lortab [hydrocodone-acetaminophen]; Tetanus toxoid, adsorbed; and Tetanus toxoids        REVIEW OF SYSTEMS  Review of Systems       All systems reviewed and negative except for those discussed in HPI.       PHYSICAL EXAM    I have reviewed the triage vital signs and nursing notes.    ED Triage Vitals   Temp Heart Rate Resp BP SpO2   03/27/24 1319 03/27/24 1319 03/27/24 1319 03/27/24 1319 03/27/24 1320   97.8 °F (36.6 °C) 60 18 135/80 93 %      Temp src Heart Rate Source Patient Position BP Location FiO2 (%)   03/27/24 1319 03/27/24 1319 03/27/24 1319 03/27/24 1319 --   Oral Monitor Lying Left arm        Physical Exam  GENERAL:   Appears in no acute distress.   HENT: Nares patent.  EYES: No scleral icterus.  CV: Regular rhythm, regular rate.  RESPIRATORY: Normal effort.  No audible wheezes, rales or rhonchi.  ABDOMEN: Soft, nontender  MUSCULOSKELETAL: No deformities.   NEURO: Alert, moves all extremities, follows commands.  SKIN: Warm, dry, no rash visualized.      LAB RESULTS  Recent Results (from the past 24 hour(s))   Comprehensive Metabolic Panel    Collection Time: 03/27/24  1:22 PM    Specimen: Blood   Result Value Ref Range    Glucose 149 (H) 65 - 99 mg/dL    BUN 27 (H) 8 - 23 mg/dL    Creatinine 1.11 0.76 - 1.27 mg/dL    Sodium 140 136 - 145 mmol/L    Potassium 4.1 3.5 - 5.2 mmol/L    Chloride 100 98 - 107 mmol/L    CO2 31.0 (H) 22.0 - 29.0 mmol/L    Calcium 7.2 (L) 8.6 - 10.5 mg/dL    Total Protein 6.2 6.0 - 8.5 g/dL    Albumin 3.9 3.5 - 5.2 g/dL    ALT (SGPT) 18 1 - 41 U/L    AST (SGOT) 22 1 - 40 U/L    Alkaline Phosphatase 56 39 - 117 U/L    Total Bilirubin 0.4 0.0 - 1.2 mg/dL    Globulin 2.3 gm/dL    A/G Ratio 1.7 g/dL    BUN/Creatinine Ratio 24.3 7.0 - 25.0    Anion Gap 9.0 5.0 - 15.0 mmol/L    eGFR 65.9 >60.0 mL/min/1.73   Magnesium    Collection Time: 03/27/24  1:22 PM    Specimen: Blood   Result Value Ref Range    Magnesium 1.3 (L) 1.6 -  2.4 mg/dL   Single High Sensitivity Troponin T    Collection Time: 03/27/24  1:22 PM    Specimen: Blood   Result Value Ref Range    HS Troponin T 21 <22 ng/L   Green Top (Gel)    Collection Time: 03/27/24  1:22 PM   Result Value Ref Range    Extra Tube Hold for add-ons.    Lavender Top    Collection Time: 03/27/24  1:22 PM   Result Value Ref Range    Extra Tube hold for add-on    Gold Top - SST    Collection Time: 03/27/24  1:22 PM   Result Value Ref Range    Extra Tube Hold for add-ons.    Gray Top    Collection Time: 03/27/24  1:22 PM   Result Value Ref Range    Extra Tube Hold for add-ons.    Light Blue Top    Collection Time: 03/27/24  1:22 PM   Result Value Ref Range    Extra Tube Hold for add-ons.    CBC Auto Differential    Collection Time: 03/27/24  1:22 PM    Specimen: Blood   Result Value Ref Range    WBC 4.62 3.40 - 10.80 10*3/mm3    RBC 4.10 (L) 4.14 - 5.80 10*6/mm3    Hemoglobin 11.6 (L) 13.0 - 17.7 g/dL    Hematocrit 35.8 (L) 37.5 - 51.0 %    MCV 87.3 79.0 - 97.0 fL    MCH 28.3 26.6 - 33.0 pg    MCHC 32.4 31.5 - 35.7 g/dL    RDW 15.9 (H) 12.3 - 15.4 %    RDW-SD 51.0 37.0 - 54.0 fl    MPV 10.2 6.0 - 12.0 fL    Platelets 124 (L) 140 - 450 10*3/mm3    Neutrophil % 76.7 (H) 42.7 - 76.0 %    Lymphocyte % 11.0 (L) 19.6 - 45.3 %    Monocyte % 7.8 5.0 - 12.0 %    Eosinophil % 3.7 0.3 - 6.2 %    Basophil % 0.4 0.0 - 1.5 %    Immature Grans % 0.4 0.0 - 0.5 %    Neutrophils, Absolute 3.54 1.70 - 7.00 10*3/mm3    Lymphocytes, Absolute 0.51 (L) 0.70 - 3.10 10*3/mm3    Monocytes, Absolute 0.36 0.10 - 0.90 10*3/mm3    Eosinophils, Absolute 0.17 0.00 - 0.40 10*3/mm3    Basophils, Absolute 0.02 0.00 - 0.20 10*3/mm3    Immature Grans, Absolute 0.02 0.00 - 0.05 10*3/mm3    nRBC 0.0 0.0 - 0.2 /100 WBC   BNP    Collection Time: 03/27/24  1:22 PM    Specimen: Blood   Result Value Ref Range    proBNP 461.8 0.0 - 1,800.0 pg/mL   Procalcitonin    Collection Time: 03/27/24  1:22 PM    Specimen: Blood   Result Value Ref Range     Procalcitonin 0.03 0.00 - 0.25 ng/mL   C-reactive Protein    Collection Time: 03/27/24  1:22 PM    Specimen: Blood   Result Value Ref Range    C-Reactive Protein <0.30 0.00 - 0.50 mg/dL   Phosphorus    Collection Time: 03/27/24  1:22 PM    Specimen: Blood   Result Value Ref Range    Phosphorus 3.2 2.5 - 4.5 mg/dL   TSH    Collection Time: 03/27/24  1:22 PM    Specimen: Blood   Result Value Ref Range    TSH 0.879 0.270 - 4.200 uIU/mL   T4, Free    Collection Time: 03/27/24  1:22 PM    Specimen: Blood   Result Value Ref Range    Free T4 1.75 (H) 0.92 - 1.68 ng/dL   Calcium, Ionized    Collection Time: 03/27/24  1:22 PM    Specimen: Blood   Result Value Ref Range    Ionized Calcium 1.02 (L) 1.12 - 1.32 mmol/L   ECG 12 Lead ED Triage Standing Order; Syncope    Collection Time: 03/27/24  1:36 PM   Result Value Ref Range    QT Interval 492 ms    QTC Interval 478 ms   Urinalysis With Microscopic If Indicated (No Culture) - Urine, Clean Catch    Collection Time: 03/27/24  2:12 PM    Specimen: Urine, Clean Catch   Result Value Ref Range    Color, UA Yellow Yellow, Straw    Appearance, UA Clear Clear    pH, UA 6.5 5.0 - 8.0    Specific Gravity, UA 1.016 1.001 - 1.030    Glucose, UA >=1000 mg/dL (3+) (A) Negative    Ketones, UA Negative Negative    Bilirubin, UA Negative Negative    Blood, UA Negative Negative    Protein, UA Negative Negative    Leuk Esterase, UA Negative Negative    Nitrite, UA Negative Negative    Urobilinogen, UA 1.0 E.U./dL 0.2 - 1.0 E.U./dL       If labs were ordered, I independently reviewed the results and considered them in treating the patient.        RADIOLOGY  XR Chest 1 View    Result Date: 3/27/2024  XR CHEST 1 VW Date of Exam: 3/27/2024 1:40 PM EDT Indication: heart failure Comparison: Chest CT 2/5/2024, chest radiograph 2024. Findings: Cardiomediastinal silhouette is unchanged. No focal consolidation or overt pulmonary edema. No pleural effusion or pneumothorax. Osseous structures are unchanged.      Impression: No evidence of acute cardiopulmonary disease. Electronically Signed: Wiley March MD  3/27/2024 1:51 PM EDT  Workstation ID: [id number]     I ordered and independently reviewed the above noted radiographic studies.      I viewed images of chest x-ray which showed no acute pathology per my independent interpretation.    See radiologist's dictation for official interpretation.        PROCEDURES    Procedures    ECG 12 Lead ED Triage Standing Order; Syncope   Preliminary Result   Test Reason : ED Triage Standing Order~   Blood Pressure :   */*   mmHG   Vent. Rate :  57 BPM     Atrial Rate :  57 BPM      P-R Int : 286 ms          QRS Dur :  94 ms       QT Int : 492 ms       P-R-T Axes :  73  31  -7 degrees      QTc Int : 478 ms      Sinus bradycardia with sinus arrhythmia with 1st degree AV block with    occasional premature ventricular complexes   Nonspecific T wave abnormality   Prolonged QT   Abnormal ECG   When compared with ECG of 08-FEB-2024 11:14,   premature ventricular complexes are now present   aberrant conduction is no longer present   Nonspecific T wave abnormality now evident in Lateral leads      Referred By: EDMD           Confirmed By:           MEDICATIONS GIVEN IN ER    Medications   sodium chloride 0.9 % bolus 250 mL (0 mL Intravenous Stopped 3/27/24 1412)   calcium gluconate 1000 Mg/50ml 0.675% NaCl IV SOLN (0 g Intravenous Stopped 3/27/24 1602)   magnesium sulfate in D5W 1g/100mL (PREMIX) (0 g Intravenous Stopped 3/27/24 1602)         MEDICAL DECISION MAKING, PROGRESS, and CONSULTS    All labs, if obtained, have been independently reviewed by me.  All radiology studies, if obtained, have been reviewed by me and the radiologist dictating the report.  All EKG's, if obtained, have been independently viewed and interpreted by me/my attending physician.      Discussion below represents my analysis of pertinent findings related to patient's condition, differential diagnosis, treatment plan  and final disposition.                         Differential diagnosis:    Stroke, myocardial infarction, sepsis, acute blood loss, anemia, electrolyte abnormality, dehydration, hypothyroidism      Additional sources:    - Discussed/ obtained information from independent historians: EMS and later family    - External (non-ED) record review:  Chart review shows previous hospitalization for anasarca believed to be secondary to mild heart failure patient was discharged on diuretics, also seen for:  **Anasarca [R60.1]   Yes   · Moderate malnutrition [E44.0]   Yes  · Hypertensive emergency [I16.1]   Yes  · Type 2 diabetes mellitus [E11.9]   Yes  · Prostate cancer [C61]   Yes  · Essential hypertension, benign [I10]   Yes  · Hypothyroidism [E03.9]    - Chronic or social conditions impacting care: Chronic diuresis, hypertension, diabetes, prostate cancer, hypothyroidism    - Shared decision making: Agreeable to discharge with strict return precautions and PCP follow-up      Orders placed during this visit:  Orders Placed This Encounter   Procedures    XR Chest 1 View    [place] Draw    Comprehensive Metabolic Panel    Magnesium    Single High Sensitivity Troponin T    CBC Auto Differential    Urinalysis With Microscopic If Indicated (No Culture) - Urine, Clean Catch    BNP    Procalcitonin    C-reactive Protein    Phosphorus    TSH    T4, Free    Calcium, Ionized    Continuous Pulse Oximetry    Vital Signs    ECG 12 Lead ED Triage Standing Order; Syncope    CBC & Differential    Green Top (Gel)    Lavender Top    Gold Top - SST    Gray Top    Light Blue Top         Additional orders considered but not ordered:      ED Course:    Consultants:      ED Course as of 03/27/24 2038   Wed Mar 27, 2024   1328 Chart review shows previous hospitalization for anasarca believed to be secondary to mild heart failure patient was discharged on diuretics, also seen for:  **Anasarca [R60.1]   Yes   · Moderate malnutrition [E44.0]    Yes  · Hypertensive emergency [I16.1]   Yes  · Type 2 diabetes mellitus [E11.9]   Yes  · Prostate cancer [C61]   Yes  · Essential hypertension, benign [I10]   Yes  · Hypothyroidism [E03.9]       [CC]   1330 In summary is an 83-year-old man brought in from [facility] because of hypotension.  Says he has been taking water pills since previous hospital admission and today felt generalized weakness and also had double vision he went to [facility] where he was found to be hypotensive at 75/52 EMS were called they have given him a 400 cc crystalloid bolus with normalization of his blood pressure.  He reports since the fluids his symptoms have resolved as well and he denies any weakness double vision or any other complaints at this time. [CC]   2033 He arrived awake and alert blood pressure normal upon arrival here and with normalization of his blood pressure he is also reporting resolution of his symptoms neurologic exam shows no focal deficits.  Denies any chest pain or leg swelling and is generally well-appearing.  Chest x-ray clear. [CC]   2036 He has very mild bradycardia in the high 50s but this would not explain his hypotension.  QTc prolongation empirically treated with magnesium and his labs came back he does turn out to be hypomagnesemic. [CC]   2036 CBC shows mild anemia 11.6 however this is stable with prior values.  Metabolic panel shows significant hypocalcemia calcium of 7.2 presumably this is iatrogenic with his daily Lasix dosing.  No elevation in procalcitonin. [CC]   2037 From IV calcium gluconate.  Normal thyroid function  Normal urinalysis.  Upon reevaluation he remains symptom-free his wife has arrived to is very anxious about the episode he had earlier I discussed I think this is likely due to hypotension from hypovolemia as it responded immediately to crystalloid with both resolution of his hypotension and his symptoms.  Was given another small 250 cc bolus here and over a total of 4  and half hours in the emergency department he had no more recurrences of the hypotension.  After this observation time they are requesting discharge.  I would like him to follow-up closely with his PCP for recheck of electrolytes in the next couple of days have recommended he hold his Lasix dose tomorrow and then start going to a half dose of 20 mg and have electrolytes rechecked by PCP.  They are agreeable to this plan.  Counseled on strict return precautions verbally expressed understanding of these. [CC]      ED Course User Index  [CC] Cesar Sanford MD              Shared Decision Making:  After my consideration of clinical presentation and any laboratory/radiology studies obtained, I discussed the findings with the patient/patient representative who is in agreement with the treatment plan and the final disposition.   Risks and benefits of discharge and/or observation/admission were discussed.       AS OF 20:38 EDT VITALS:    BP - 141/90  HR - 57  TEMP - 97.8 °F (36.6 °C) (Oral)  O2 SATS - 97%                  DIAGNOSIS  Final diagnoses:   Hypotension due to hypovolemia   Hypocalcemia   Hypomagnesemia   Prostate cancer         DISPOSITION  DISCHARGE    Patient discharged in stable condition.    Reviewed implications of results, diagnosis, meds, responsibility to follow up, warning signs and symptoms of possible worsening, potential complications and reasons to return to ER.    Patient/Family voiced understanding of above instructions.    Discussed plan for discharge, as there is no emergent indication for admission.  Pt/family is agreeable and understands need for follow up and possible repeat testing.  Pt/family is aware that discharge does not mean that nothing is wrong but that it indicates no emergency is currently present that requires admission and they must continue care with follow-up as given below or with a physician of their choice.     FOLLOW-UP  Ivan Yoder MD  [id number]    [place] [id number] [phone]    Call   In the next few days for recheck of electrolytes.         Medication List        New Prescriptions      calcium carbonate 500 MG chewable tablet  Commonly known as: Tums  Chew 1 tablet Daily.            Changed      furosemide 40 MG tablet  Commonly known as: LASIX  Take 0.5 tablets by mouth Daily.  What changed: how much to take     levothyroxine 150 MCG tablet  Commonly known as: SYNTHROID, LEVOTHROID  Take 1 tablet by mouth Daily.  What changed: when to take this               Where to Get Your Medications        These medications were sent to [id number] - [place], [place] - [address] AT [facility] - [phone]  - [phone]   [address] [facility] [address]      Phone: [phone]   calcium carbonate 500 MG chewable tablet  furosemide 40 MG tablet             Please note that portions of this document were completed with voice recognition software.        Cesar Sanford MD  03/27/24 2038

## 2024-04-02 LAB
QT INTERVAL: 492 MS
QTC INTERVAL: 478 MS

## 2024-04-09 RX ORDER — EMPAGLIFLOZIN 10 MG/1
10 TABLET, FILM COATED ORAL DAILY
Qty: 30 TABLET | Refills: 5 | Status: SHIPPED | OUTPATIENT
Start: 2024-04-09 | End: 2024-04-11 | Stop reason: SDUPTHER

## 2024-04-10 ENCOUNTER — TELEPHONE (OUTPATIENT)
Dept: FAMILY MEDICINE CLINIC | Facility: CLINIC | Age: 84
End: 2024-04-10
Payer: COMMERCIAL

## 2024-04-10 NOTE — TELEPHONE ENCOUNTER
There is some kind of a mistake here.  We called in Jardiance refill for the patient's [facility] which is [facility]  [place] so this is a mixup of some sort.  I have never called in Ozempic for Vinnie Fulton

## 2024-04-10 NOTE — TELEPHONE ENCOUNTER
JONATHAN FROM [facility] CALLED AND WANTED TO CLARIFY THE OZEMPIC DOSAGE, USUALLY FIRST TIME USERS START AT 0.25MG AND THE SCRIPT IS FOR .5MG. SHE STATED THEY TAKE A LUNCH FROM 1-1:30PM, [phone]      PLEASE ADVISE

## 2024-04-11 ENCOUNTER — LAB (OUTPATIENT)
Dept: FAMILY MEDICINE CLINIC | Facility: CLINIC | Age: 84
End: 2024-04-11
Payer: MEDICARE

## 2024-04-11 ENCOUNTER — OFFICE VISIT (OUTPATIENT)
Dept: FAMILY MEDICINE CLINIC | Facility: CLINIC | Age: 84
End: 2024-04-11
Payer: MEDICARE

## 2024-04-11 VITALS
HEIGHT: 71 IN | WEIGHT: 217 LBS | OXYGEN SATURATION: 96 % | BODY MASS INDEX: 30.38 KG/M2 | DIASTOLIC BLOOD PRESSURE: 80 MMHG | TEMPERATURE: 96.5 F | RESPIRATION RATE: 16 BRPM | HEART RATE: 52 BPM | SYSTOLIC BLOOD PRESSURE: 116 MMHG

## 2024-04-11 DIAGNOSIS — I10 ESSENTIAL HYPERTENSION, BENIGN: ICD-10-CM

## 2024-04-11 DIAGNOSIS — E11.65 TYPE 2 DIABETES MELLITUS WITH HYPERGLYCEMIA, WITHOUT LONG-TERM CURRENT USE OF INSULIN: ICD-10-CM

## 2024-04-11 DIAGNOSIS — M15.9 GENERALIZED OSTEOARTHROSIS, INVOLVING MULTIPLE SITES: ICD-10-CM

## 2024-04-11 DIAGNOSIS — R25.1 TREMOR OF BOTH HANDS: ICD-10-CM

## 2024-04-11 DIAGNOSIS — E03.8 OTHER SPECIFIED HYPOTHYROIDISM: ICD-10-CM

## 2024-04-11 DIAGNOSIS — Z00.00 MEDICARE ANNUAL WELLNESS VISIT, SUBSEQUENT: Primary | ICD-10-CM

## 2024-04-11 DIAGNOSIS — C61 PROSTATE CANCER: ICD-10-CM

## 2024-04-11 DIAGNOSIS — M88.9 PAGET'S DISEASE OF BONE: ICD-10-CM

## 2024-04-11 DIAGNOSIS — M19.90 OSTEOARTHRITIS, UNSPECIFIED OSTEOARTHRITIS TYPE, UNSPECIFIED SITE: ICD-10-CM

## 2024-04-11 DIAGNOSIS — E78.5 HYPERLIPIDEMIA, UNSPECIFIED HYPERLIPIDEMIA TYPE: ICD-10-CM

## 2024-04-11 DIAGNOSIS — K21.9 GASTROESOPHAGEAL REFLUX DISEASE WITHOUT ESOPHAGITIS: ICD-10-CM

## 2024-04-11 DIAGNOSIS — K21.9 GASTROESOPHAGEAL REFLUX DISEASE, UNSPECIFIED WHETHER ESOPHAGITIS PRESENT: ICD-10-CM

## 2024-04-11 DIAGNOSIS — E03.9 ACQUIRED HYPOTHYROIDISM: ICD-10-CM

## 2024-04-11 LAB
ALBUMIN SERPL-MCNC: 4.3 G/DL (ref 3.5–5.2)
ALBUMIN/GLOB SERPL: 1.8 G/DL
ALP SERPL-CCNC: 60 U/L (ref 39–117)
ALT SERPL W P-5'-P-CCNC: 17 U/L (ref 1–41)
ANION GAP SERPL CALCULATED.3IONS-SCNC: 12 MMOL/L (ref 5–15)
AST SERPL-CCNC: 20 U/L (ref 1–40)
BILIRUB SERPL-MCNC: 0.4 MG/DL (ref 0–1.2)
BUN SERPL-MCNC: 18 MG/DL (ref 8–23)
BUN/CREAT SERPL: 18.2 (ref 7–25)
CALCIUM SPEC-SCNC: 7.5 MG/DL (ref 8.6–10.5)
CHLORIDE SERPL-SCNC: 102 MMOL/L (ref 98–107)
CHOLEST SERPL-MCNC: 109 MG/DL (ref 0–200)
CO2 SERPL-SCNC: 28 MMOL/L (ref 22–29)
CREAT SERPL-MCNC: 0.99 MG/DL (ref 0.76–1.27)
DEPRECATED RDW RBC AUTO: 54.5 FL (ref 37–54)
EGFRCR SERPLBLD CKD-EPI 2021: 75.6 ML/MIN/1.73
ERYTHROCYTE [DISTWIDTH] IN BLOOD BY AUTOMATED COUNT: 16.8 % (ref 12.3–15.4)
GLOBULIN UR ELPH-MCNC: 2.4 GM/DL
GLUCOSE SERPL-MCNC: 110 MG/DL (ref 65–99)
HBA1C MFR BLD: 6.9 % (ref 4.8–5.6)
HCT VFR BLD AUTO: 39.7 % (ref 37.5–51)
HDLC SERPL-MCNC: 49 MG/DL (ref 40–60)
HGB BLD-MCNC: 12.2 G/DL (ref 13–17.7)
LDLC SERPL CALC-MCNC: 47 MG/DL (ref 0–100)
LDLC/HDLC SERPL: 1 {RATIO}
MAGNESIUM SERPL-MCNC: 1.7 MG/DL (ref 1.6–2.4)
MCH RBC QN AUTO: 27.2 PG (ref 26.6–33)
MCHC RBC AUTO-ENTMCNC: 30.7 G/DL (ref 31.5–35.7)
MCV RBC AUTO: 88.6 FL (ref 79–97)
PLATELET # BLD AUTO: 139 10*3/MM3 (ref 140–450)
PMV BLD AUTO: 10.8 FL (ref 6–12)
POTASSIUM SERPL-SCNC: 4.3 MMOL/L (ref 3.5–5.2)
PROT SERPL-MCNC: 6.7 G/DL (ref 6–8.5)
RBC # BLD AUTO: 4.48 10*6/MM3 (ref 4.14–5.8)
SODIUM SERPL-SCNC: 142 MMOL/L (ref 136–145)
TRIGL SERPL-MCNC: 55 MG/DL (ref 0–150)
VLDLC SERPL-MCNC: 13 MG/DL (ref 5–40)
WBC NRBC COR # BLD AUTO: 4.29 10*3/MM3 (ref 3.4–10.8)

## 2024-04-11 PROCEDURE — 80061 LIPID PANEL: CPT | Performed by: FAMILY MEDICINE

## 2024-04-11 PROCEDURE — 85027 COMPLETE CBC AUTOMATED: CPT | Performed by: FAMILY MEDICINE

## 2024-04-11 PROCEDURE — 80053 COMPREHEN METABOLIC PANEL: CPT | Performed by: FAMILY MEDICINE

## 2024-04-11 PROCEDURE — 83036 HEMOGLOBIN GLYCOSYLATED A1C: CPT | Performed by: FAMILY MEDICINE

## 2024-04-11 PROCEDURE — 36415 COLL VENOUS BLD VENIPUNCTURE: CPT | Performed by: FAMILY MEDICINE

## 2024-04-11 PROCEDURE — 83735 ASSAY OF MAGNESIUM: CPT | Performed by: FAMILY MEDICINE

## 2024-04-11 RX ORDER — ROSUVASTATIN CALCIUM 20 MG/1
20 TABLET, COATED ORAL NIGHTLY
Qty: 90 TABLET | Refills: 3 | Status: SHIPPED | OUTPATIENT
Start: 2024-04-11

## 2024-04-11 RX ORDER — METOPROLOL SUCCINATE 25 MG/1
25 TABLET, EXTENDED RELEASE ORAL DAILY
Qty: 90 TABLET | Refills: 3 | Status: SHIPPED | OUTPATIENT
Start: 2024-04-11

## 2024-04-11 RX ORDER — LEVOTHYROXINE SODIUM 0.15 MG/1
150 TABLET ORAL
Qty: 90 TABLET | Refills: 3 | Status: SHIPPED | OUTPATIENT
Start: 2024-04-11

## 2024-04-11 RX ORDER — SPIRONOLACTONE 25 MG/1
25 TABLET ORAL EVERY MORNING
Qty: 90 TABLET | Refills: 3 | Status: SHIPPED | OUTPATIENT
Start: 2024-04-11

## 2024-04-11 RX ORDER — FLECAINIDE ACETATE 50 MG/1
50 TABLET ORAL 2 TIMES DAILY
Qty: 60 TABLET | Refills: 11 | Status: SHIPPED | OUTPATIENT
Start: 2024-04-11

## 2024-04-11 RX ORDER — DOXAZOSIN MESYLATE 4 MG/1
4 TABLET ORAL NIGHTLY
Qty: 90 TABLET | Refills: 3 | Status: SHIPPED | OUTPATIENT
Start: 2024-04-11

## 2024-04-11 RX ORDER — PANTOPRAZOLE SODIUM 40 MG/1
40 TABLET, DELAYED RELEASE ORAL DAILY
Qty: 90 TABLET | Refills: 3 | Status: SHIPPED | OUTPATIENT
Start: 2024-04-11

## 2024-04-11 RX ORDER — FUROSEMIDE 40 MG/1
20 TABLET ORAL DAILY
Qty: 90 TABLET | Refills: 3 | Status: SHIPPED | OUTPATIENT
Start: 2024-04-11

## 2024-04-12 NOTE — PROGRESS NOTES
The ABCs of the Annual Wellness Visit  Subsequent Medicare Wellness Visit    Chief Complaint   Patient presents with    Medicare Wellness-subsequent      Subjective   History of Present Illness:  Vinnie Fulton is a 83 y.o. male who presents for a Subsequent Medicare Wellness Visit.    The following portions of the patient's history were reviewed and   updated as appropriate: allergies, current medications, past family history, past medical history, past social history, past surgical history, and problem list.    Compared to one year ago, the patient feels his physical   health is better.    Compared to one year ago, the patient feels his mental   health is the same.    Recent Hospitalizations:  This patient has had a [facility] admission record on file within the last 365 days.    Current Medical Providers:  Patient Care Team:  Ivan Yoder MD as PCP - General  Ivan Yoder MD as PCP - Family Medicine  Ever Diaz MD as Consulting Physician (Cardiology)    Outpatient Medications Prior to Visit   Medication Sig Dispense Refill    aspirin 81 MG EC tablet Take 1 tablet by mouth Daily. OTC      calcium carbonate (Tums) 500 MG chewable tablet Chew 1 tablet Daily. 30 tablet 0    metFORMIN ER (GLUCOPHAGE-XR) 500 MG 24 hr tablet Take 2 tablets by mouth Daily With Breakfast. 180 tablet 3    Orgovyx 120 MG tablet tablet Take 1 tablet by mouth Daily.      rivaroxaban (XARELTO) 20 MG tablet Take 1 tablet by mouth Daily With Dinner.      sertraline (ZOLOFT) 50 MG tablet Take 1 tablet by mouth Daily. 90 tablet 3    doxazosin (CARDURA) 4 MG tablet Take 1 tablet by mouth Every Night.      empagliflozin (Jardiance) 10 MG tablet tablet TAKE 1 TABLET BY MOUTH DAILY 30 tablet 5    flecainide (TAMBOCOR) 50 MG tablet Take 1 tablet by mouth 2 (Two) Times a Day.      furosemide (LASIX) 40 MG tablet Take 0.5 tablets by mouth Daily. 30 tablet 0    levothyroxine (SYNTHROID, LEVOTHROID) 150 MCG  tablet Take 1 tablet by mouth Daily. (Patient taking differently: Take 1 tablet by mouth Every Morning.) 90 tablet 3    metoprolol succinate XL (TOPROL-XL) 25 MG 24 hr tablet Take 1 tablet by mouth Daily.      pantoprazole (PROTONIX) 40 MG EC tablet TAKE 1 TABLET BY MOUTH DAILY 90 tablet 3    rosuvastatin (CRESTOR) 20 MG tablet Take 1 tablet by mouth Every Night. 30 tablet 5    sacubitril-valsartan (ENTRESTO) 49-51 MG tablet Take 1 tablet by mouth Every 12 (Twelve) Hours. 60 tablet 0    spironolactone (ALDACTONE) 25 MG tablet Take 1 tablet by mouth Every Morning. 90 tablet 3     No facility-administered medications prior to visit.       No opioid medication identified on active medication list. I have reviewed chart for other potential  high risk medication/s and harmful drug interactions in the elderly.        Aspirin is on active medication list. Aspirin use is indicated based on review of current medical condition/s. Pros and cons of this therapy have been discussed today. Benefits of this medication outweigh potential harm.  Patient has been encouraged to continue taking this medication.  .      Patient Active Problem List   Diagnosis    Paget's disease of bone    Esophageal reflux    Essential hypertension, benign    Allergic rhinitis    Generalized osteoarthrosis, involving multiple sites    Hyperlipidemia    Hypothyroidism    Herpes zoster    Cellulitis and abscess    Hypertensive emergency    Left facial numbness    Prostate cancer    Hypomagnesemia    Type 2 diabetes mellitus    Left-sided Bell's palsy    Tremor of both hands    Shuffling gait    Orthostatic hypotension    Pain of hand    Vertigo    Osteoarthritis    Anasarca    Moderate malnutrition     Advance Care Planning  ACP discussion was declined by the patient.      Review of Systems      Objective      Vitals:    04/11/24 1012   BP: 116/80   Pulse: 52   Resp: 16   Temp: 96.5 °F (35.8 °C)   SpO2: 96%   Weight: 98.4 kg (217 lb)   Height: 180.3 cm  "(71\")   PainSc: 0-No pain     BMI Readings from Last 1 Encounters:   24 30.27 kg/m²   BMI is above normal parameters. Recommendations include: nutrition counseling    Does the patient have evidence of cognitive impairment? No    Physical Exam            HEALTH RISK ASSESSMENT    Smoking Status:  Social History     Tobacco Use   Smoking Status Former    Current packs/day: 0.00    Average packs/day: 1 pack/day for 1 year (1.0 ttl pk-yrs)    Types: Cigarettes    Start date: 1999    Quit date: 2000    Years since quittin.2   Smokeless Tobacco Former     Alcohol Consumption:  Social History     Substance and Sexual Activity   Alcohol Use No     Fall Risk Screen:    JOSEADI Fall Risk Assessment was completed, and patient is at MODERATE risk for falls. Assessment completed on:2024    Depression Screenin/11/2024    10:23 AM   PHQ-2/PHQ-9 Depression Screening   Little Interest or Pleasure in Doing Things 0-->not at all   Feeling Down, Depressed or Hopeless 0-->not at all   PHQ-9: Brief Depression Severity Measure Score 0       Health Habits and Functional and Cognitive Screening:      10/24/2022    11:15 AM   Functional & Cognitive Status   Do you have difficulty preparing food and eating? No   Do you have difficulty bathing yourself, getting dressed or grooming yourself? No   Do you have difficulty using the toilet? No   Do you have difficulty moving around from place to place? No   Do you have trouble with steps or getting out of a bed or a chair? No   Current Diet Well Balanced Diet   Current Exercises Include No Regular Exercise   Do you need help using the phone?  No   Are you deaf or do you have serious difficulty hearing?  No   Do you need help to go to places out of walking distance? No   Do you need help shopping? No   Do you need help preparing meals?  No   Do you need help with housework?  No   Do you need help with laundry? No   Do you need help taking your medications? No   Do you " need help managing money? No   Do you ever drive or ride in a car without wearing a seat belt? No   Have you felt unusual stress, anger or loneliness in the last month? No   Who do you live with? Spouse   If you need help, do you have trouble finding someone available to you? No   Have you been bothered in the last four weeks by sexual problems? No   Do you have difficulty concentrating, remembering or making decisions? No       Age-appropriate Screening Schedule:  Refer to the list below for future screening recommendations based on patient's age, sex and/or medical conditions. Orders for these recommended tests are listed in the plan section. The patient has been provided with a written plan.    Health Maintenance   Topic Date Due    BMI FOLLOWUP  10/24/2023    DIABETIC EYE EXAM  11/17/2023    LIPID PANEL  03/18/2024    URINE MICROALBUMIN  03/22/2024    HEMOGLOBIN A1C  04/13/2024    ZOSTER VACCINE (1 of 2) 04/11/2024 (Originally 7/10/1990)    RSV Vaccine - Adults (1 - 1-dose 60+ series) 04/11/2025 (Originally 7/10/2000)    INFLUENZA VACCINE  08/01/2024    ANNUAL WELLNESS VISIT  04/11/2025    COVID-19 Vaccine  Completed    Pneumococcal Vaccine 65+  Completed              Assessment & Plan     CMS Preventative Services Quick Reference  Risk Factors Identified During Encounter  None Identified  The above risks/problems have been discussed with the patient.  Follow up actions/plans if indicated are seen below in the Assessment/Plan Section.  Pertinent information has been shared with the patient in the After Visit Summary.    Diagnoses and all orders for this visit:    1. Medicare annual wellness visit, subsequent (Primary)    2. Type 2 diabetes mellitus with hyperglycemia, without long-term current use of insulin  -     Hemoglobin A1c  -     Comprehensive Metabolic Panel    3. Essential hypertension, benign  -     Comprehensive Metabolic Panel  -     Magnesium    4. Hyperlipidemia, unspecified hyperlipidemia type  -      Comprehensive Metabolic Panel  -     Lipid Panel    5. Prostate cancer  -     Comprehensive Metabolic Panel  -     CBC (No Diff)    6. Paget's disease of bone  -     Comprehensive Metabolic Panel    7. Generalized osteoarthrosis, involving multiple sites    8. Tremor of both hands    9. Osteoarthritis, unspecified osteoarthritis type, unspecified site    10. Acquired hypothyroidism    11. Gastroesophageal reflux disease, unspecified whether esophagitis present  -     Comprehensive Metabolic Panel  -     CBC (No Diff)    12. Other specified hypothyroidism  -     levothyroxine (SYNTHROID, LEVOTHROID) 150 MCG tablet; Take 1 tablet by mouth Every Morning.  Dispense: 90 tablet; Refill: 3    13. Gastroesophageal reflux disease without esophagitis  -     pantoprazole (PROTONIX) 40 MG EC tablet; Take 1 tablet by mouth Daily.  Dispense: 90 tablet; Refill: 3    Other orders  -     doxazosin (CARDURA) 4 MG tablet; Take 1 tablet by mouth Every Night.  Dispense: 90 tablet; Refill: 3  -     empagliflozin (Jardiance) 10 MG tablet tablet; Take 1 tablet by mouth Daily.  Dispense: 30 tablet; Refill: 11  -     flecainide (TAMBOCOR) 50 MG tablet; Take 1 tablet by mouth 2 (Two) Times a Day.  Dispense: 60 tablet; Refill: 11  -     furosemide (LASIX) 40 MG tablet; Take 0.5 tablets by mouth Daily.  Dispense: 90 tablet; Refill: 3  -     metoprolol succinate XL (TOPROL-XL) 25 MG 24 hr tablet; Take 1 tablet by mouth Daily.  Dispense: 90 tablet; Refill: 3  -     rosuvastatin (CRESTOR) 20 MG tablet; Take 1 tablet by mouth Every Night.  Dispense: 90 tablet; Refill: 3  -     sacubitril-valsartan (ENTRESTO) 49-51 MG tablet; Take 1 tablet by mouth Every 12 (Twelve) Hours.  Dispense: 60 tablet; Refill: 11  -     spironolactone (ALDACTONE) 25 MG tablet; Take 1 tablet by mouth Every Morning.  Dispense: 90 tablet; Refill: 3        Follow Up:  Return in about 6 months (around 10/11/2024) for Recheck.     An After Visit Summary and PPPS were given to  the patient.

## 2024-11-12 DIAGNOSIS — E03.8 OTHER SPECIFIED HYPOTHYROIDISM: ICD-10-CM

## 2024-11-12 DIAGNOSIS — F41.9 ANXIETY: ICD-10-CM

## 2024-11-12 RX ORDER — LEVOTHYROXINE SODIUM 150 UG/1
150 TABLET ORAL
Qty: 90 TABLET | Refills: 3 | Status: SHIPPED | OUTPATIENT
Start: 2024-11-12

## 2024-11-12 NOTE — TELEPHONE ENCOUNTER
Caller: Vinnie Fulton    Relationship: Self    Best call back number: [phone]     Requested Prescriptions:   Requested Prescriptions     Pending Prescriptions Disp Refills    sertraline (ZOLOFT) 50 MG tablet 90 tablet 3     Sig: Take 1 tablet by mouth Daily.    levothyroxine (SYNTHROID, LEVOTHROID) 150 MCG tablet 90 tablet 3     Sig: Take 1 tablet by mouth Every Morning.        Pharmacy where request should be sent: [id number] [address] AT [facility] [phone] [facility] [phone]      Last office visit with prescribing clinician: 4/11/2024   Last telemedicine visit with prescribing clinician: Visit date not found   Next office visit with prescribing clinician: Visit date not found     Additional details provided by patient: LESS THAN 3 DAYS     Does the patient have less than a 3 day supply:  [x] Yes      Would you like a call back once the refill request has been completed: [] Yes [x] No    If the office needs to give you a call back, can they leave a voicemail: [] Yes [x] No    Dona Quarles MA   11/12/24 13:04 EST

## 2025-01-23 ENCOUNTER — TELEPHONE (OUTPATIENT)
Dept: FAMILY MEDICINE CLINIC | Facility: CLINIC | Age: 85
End: 2025-01-23

## 2025-01-23 NOTE — TELEPHONE ENCOUNTER
Caller: Vinnie Fulton    Relationship: Self    Best call back number: [phone] (Mobile)     Who are you requesting to speak with (clinical staff, provider,  specific staff member):   CLINICAL    What was the call regarding:  PATIENT SAID HE IS HAVING TROUBLE WITH HIS PENIS   HE IS PASSING BLOOD FOR A COUPLE OF DAYS     PATIENT THINKS HE MIGHT NEED TO BE CIRCUMCISED BECAUSE HE WAS NOT WHEN HE WAS BORN     HE ALSO HAS A URINARY TRACT INFECTION AND IS REQUESTING A MEDICATION FOR THIS     HE HAS [facility] AND DR HARRIS WITH  IS HIS DOCTOR     PATIENT SEES DR GONSALVES UROLOGIST   PATIENT WANTS TO SEE IF HE SHOULD CALL HIM TO HELP HIM TAKE CARE OF THIS     PLEASE CALL AND ADVISE

## 2025-01-23 NOTE — TELEPHONE ENCOUNTER
Pt notified Dr. Yoder is out of the office until tomorrow and he will address this message then; but I did notify pt that Dr. Yoder would likely recc him to f/u with his urologist. He verbalized understanding.

## 2025-01-28 DIAGNOSIS — E11.65 TYPE 2 DIABETES MELLITUS WITH HYPERGLYCEMIA, WITHOUT LONG-TERM CURRENT USE OF INSULIN: ICD-10-CM

## 2025-01-28 RX ORDER — METFORMIN HYDROCHLORIDE 500 MG/1
1000 TABLET, EXTENDED RELEASE ORAL
Qty: 180 TABLET | Refills: 3 | Status: SHIPPED | OUTPATIENT
Start: 2025-01-28

## 2025-02-28 RX ORDER — SPIRONOLACTONE 25 MG/1
25 TABLET ORAL EVERY MORNING
Qty: 90 TABLET | Refills: 3 | Status: SHIPPED | OUTPATIENT
Start: 2025-02-28

## 2025-02-28 NOTE — TELEPHONE ENCOUNTER
Caller: Vinnie Fulton    Relationship: Self    Best call back number: [phone]     Requested Prescriptions:   Requested Prescriptions     Pending Prescriptions Disp Refills    spironolactone (ALDACTONE) 25 MG tablet 90 tablet 3     Sig: Take 1 tablet by mouth Every Morning.        Pharmacy where request should be sent: [facility] [id number] [address] [phone] [facility] [phone] [facility]     Last office visit with prescribing clinician: 4/11/2024   Last telemedicine visit with prescribing clinician: Visit date not found   Next office visit with prescribing clinician: 4/17/2025     Does the patient have less than a 3 day supply:  [x] Yes  [] No    Would you like a call back once the refill request has been completed: [] Yes [x] No    If the office needs to give you a call back, can they leave a voicemail: [] Yes [x] No    Segundo Medrano Rep   02/28/25 13:21 EST

## 2025-03-05 RX ORDER — METOPROLOL SUCCINATE 25 MG/1
25 TABLET, EXTENDED RELEASE ORAL DAILY
Qty: 90 TABLET | Refills: 1 | Status: SHIPPED | OUTPATIENT
Start: 2025-03-05

## 2025-03-05 NOTE — TELEPHONE ENCOUNTER
Caller: Vinnie Fulton    Relationship: Self    Best call back number:      [phone] (Mobile)     Requested Prescriptions:   Requested Prescriptions     Pending Prescriptions Disp Refills    empagliflozin (Jardiance) 10 MG tablet tablet 30 tablet 11     Sig: Take 1 tablet by mouth Daily.    metoprolol succinate XL (TOPROL-XL) 25 MG 24 hr tablet 90 tablet 3     Sig: Take 1 tablet by mouth Daily.      Pharmacy where request should be sent: [facility] [id number] [address] [phone] [facility] [phone]      Last office visit with prescribing clinician: 4/11/2024   Last telemedicine visit with prescribing clinician: Visit date not found   Next office visit with prescribing clinician: 4/17/2025       Does the patient have less than a 3 day supply:  [x] Yes  [] No    Would you like a call back once the refill request has been completed: [] Yes [x] No    If the office needs to give you a call back, can they leave a voicemail: [] Yes [x] No

## 2025-03-11 NOTE — TELEPHONE ENCOUNTER
Caller: Vinnie Fulton    Relationship: Self    Best call back number: [phone]     Requested Prescriptions:   Requested Prescriptions     Pending Prescriptions Disp Refills    flecainide (TAMBOCOR) 50 MG tablet 60 tablet 11     Sig: Take 1 tablet by mouth 2 (Two) Times a Day.    furosemide (LASIX) 40 MG tablet 90 tablet 3     Sig: Take 0.5 tablets by mouth Daily.        Pharmacy where request should be sent: [facility] [id number] [address] [phone] [facility] [phone]      Last office visit with prescribing clinician: 4/11/2024   Last telemedicine visit with prescribing clinician: Visit date not found   Next office visit with prescribing clinician: 4/17/2025     Additional details provided by patient: ONLY 2 DAYS LEFT OF MEDICATION     Does the patient have less than a 3 day supply:  [x] Yes  [] No    Would you like a call back once the refill request has been completed: [] Yes [x] No    If the office needs to give you a call back, can they leave a voicemail: [] Yes [x] No    Dona Quarles MA   03/11/25 15:48 EDT

## 2025-03-13 RX ORDER — FUROSEMIDE 40 MG/1
20 TABLET ORAL DAILY
Qty: 90 TABLET | Refills: 3 | Status: SHIPPED | OUTPATIENT
Start: 2025-03-13

## 2025-03-13 RX ORDER — FLECAINIDE ACETATE 50 MG/1
50 TABLET ORAL 2 TIMES DAILY
Qty: 60 TABLET | Refills: 11 | Status: SHIPPED | OUTPATIENT
Start: 2025-03-13

## 2025-03-31 DIAGNOSIS — K21.9 GASTROESOPHAGEAL REFLUX DISEASE WITHOUT ESOPHAGITIS: ICD-10-CM

## 2025-03-31 RX ORDER — PANTOPRAZOLE SODIUM 40 MG/1
40 TABLET, DELAYED RELEASE ORAL DAILY
Qty: 90 TABLET | Refills: 3 | Status: SHIPPED | OUTPATIENT
Start: 2025-03-31

## 2025-03-31 NOTE — TELEPHONE ENCOUNTER
Caller: Vinnie Fulton    Relationship: Self    Best call back number: [phone]     Requested Prescriptions:   Requested Prescriptions     Pending Prescriptions Disp Refills    pantoprazole (PROTONIX) 40 MG EC tablet 90 tablet 3     Sig: Take 1 tablet by mouth Daily.        Pharmacy where request should be sent: [facility] [id number] [address] [phone] [facility] [phone]      Last office visit with prescribing clinician: 4/11/2024   Last telemedicine visit with prescribing clinician: Visit date not found   Next office visit with prescribing clinician: 4/17/2025     Additional details provided by patient: PATIENT HAS FOUR DAYS LEFT    Does the patient have less than a 3 day supply:  [] Yes  [x] No    Would you like a call back once the refill request has been completed: [] Yes [x] No    If the office needs to give you a call back, can they leave a voicemail: [x] Yes [] No    Segundo Enriquez Rep   03/31/25 10:28 EDT

## 2025-04-17 ENCOUNTER — OFFICE VISIT (OUTPATIENT)
Dept: FAMILY MEDICINE CLINIC | Facility: CLINIC | Age: 85
End: 2025-04-17
Payer: MEDICARE

## 2025-04-17 VITALS
BODY MASS INDEX: 31.05 KG/M2 | DIASTOLIC BLOOD PRESSURE: 74 MMHG | WEIGHT: 221.8 LBS | HEIGHT: 71 IN | OXYGEN SATURATION: 96 % | RESPIRATION RATE: 20 BRPM | SYSTOLIC BLOOD PRESSURE: 124 MMHG | TEMPERATURE: 96.6 F | HEART RATE: 44 BPM

## 2025-04-17 DIAGNOSIS — E03.8 OTHER SPECIFIED HYPOTHYROIDISM: ICD-10-CM

## 2025-04-17 DIAGNOSIS — F41.9 ANXIETY: ICD-10-CM

## 2025-04-17 DIAGNOSIS — E03.9 ACQUIRED HYPOTHYROIDISM: ICD-10-CM

## 2025-04-17 DIAGNOSIS — M15.9 GENERALIZED OSTEOARTHROSIS, INVOLVING MULTIPLE SITES: ICD-10-CM

## 2025-04-17 DIAGNOSIS — E11.65 TYPE 2 DIABETES MELLITUS WITH HYPERGLYCEMIA, WITHOUT LONG-TERM CURRENT USE OF INSULIN: ICD-10-CM

## 2025-04-17 DIAGNOSIS — Z00.00 MEDICARE ANNUAL WELLNESS VISIT, SUBSEQUENT: Primary | ICD-10-CM

## 2025-04-17 DIAGNOSIS — M88.9 PAGET'S DISEASE OF BONE: ICD-10-CM

## 2025-04-17 DIAGNOSIS — I48.0 PAROXYSMAL ATRIAL FIBRILLATION: ICD-10-CM

## 2025-04-17 DIAGNOSIS — I10 ESSENTIAL HYPERTENSION, BENIGN: ICD-10-CM

## 2025-04-17 DIAGNOSIS — K21.9 GASTROESOPHAGEAL REFLUX DISEASE, UNSPECIFIED WHETHER ESOPHAGITIS PRESENT: ICD-10-CM

## 2025-04-17 DIAGNOSIS — C61 PROSTATE CANCER: ICD-10-CM

## 2025-04-17 DIAGNOSIS — R25.1 TREMOR OF BOTH HANDS: ICD-10-CM

## 2025-04-17 DIAGNOSIS — E78.5 HYPERLIPIDEMIA, UNSPECIFIED HYPERLIPIDEMIA TYPE: ICD-10-CM

## 2025-04-17 RX ORDER — METFORMIN HYDROCHLORIDE 500 MG/1
1000 TABLET, EXTENDED RELEASE ORAL
Qty: 180 TABLET | Refills: 3 | Status: SHIPPED | OUTPATIENT
Start: 2025-04-17

## 2025-04-17 RX ORDER — CLOTRIMAZOLE AND BETAMETHASONE DIPROPIONATE 10; .64 MG/G; MG/G
1 CREAM TOPICAL 2 TIMES DAILY
COMMUNITY

## 2025-04-17 RX ORDER — ROSUVASTATIN CALCIUM 20 MG/1
20 TABLET, COATED ORAL NIGHTLY
Qty: 90 TABLET | Refills: 3 | Status: SHIPPED | OUTPATIENT
Start: 2025-04-17

## 2025-04-17 RX ORDER — LEVOTHYROXINE SODIUM 150 UG/1
150 TABLET ORAL
Qty: 90 TABLET | Refills: 3 | Status: SHIPPED | OUTPATIENT
Start: 2025-04-17

## 2025-04-17 RX ORDER — METOPROLOL SUCCINATE 25 MG/1
25 TABLET, EXTENDED RELEASE ORAL DAILY
Qty: 90 TABLET | Refills: 3 | Status: SHIPPED | OUTPATIENT
Start: 2025-04-17

## 2025-04-20 NOTE — PROGRESS NOTES
The ABCs of the Annual Wellness Visit  Subsequent Medicare Wellness Visit    Chief Complaint   Patient presents with    Medicare Wellness-subsequent      Subjective   History of Present Illness:  Vinnie Fulton is a 84 y.o. male who presents for a Subsequent Medicare Wellness Visit.  History of Present Illness      The following portions of the patient's history were reviewed and   updated as appropriate: allergies, current medications, past family history, past medical history, past social history, past surgical history, and problem list.    Compared to one year ago, the patient feels his physical   health is better.    Compared to one year ago, the patient feels his mental   health is the same.    Recent Hospitalizations:  He was admitted within the past 365 days at [facility].       Current Medical Providers:  Patient Care Team:  Ivan Yoder MD as PCP - General  Ivan Yoder MD as PCP - Family Medicine  Ever Diaz MD as Consulting Physician (Cardiology)    Outpatient Medications Prior to Visit   Medication Sig Dispense Refill    aspirin 81 MG EC tablet Take 1 tablet by mouth Daily. OTC      calcium carbonate (Tums) 500 MG chewable tablet Chew 1 tablet Daily. 30 tablet 0    clotrimazole-betamethasone (LOTRISONE) 1-0.05 % cream Apply 1 Application topically to the appropriate area as directed 2 (Two) Times a Day.      doxazosin (CARDURA) 4 MG tablet Take 1 tablet by mouth Every Night. 90 tablet 3    flecainide (TAMBOCOR) 50 MG tablet Take 1 tablet by mouth 2 (Two) Times a Day. 60 tablet 11    furosemide (LASIX) 40 MG tablet Take 0.5 tablets by mouth Daily. 90 tablet 3    Orgovyx 120 MG tablet tablet Take 1 tablet by mouth Daily.      pantoprazole (PROTONIX) 40 MG EC tablet Take 1 tablet by mouth Daily. 90 tablet 3    sacubitril-valsartan (ENTRESTO) 49-51 MG tablet Take 1 tablet by mouth Every 12 (Twelve) Hours. 60 tablet 11    spironolactone (ALDACTONE) 25 MG tablet Take 1  tablet by mouth Every Morning. 90 tablet 3    empagliflozin (Jardiance) 10 MG tablet tablet Take 1 tablet by mouth Daily. 30 tablet 2    levothyroxine (SYNTHROID, LEVOTHROID) 150 MCG tablet Take 1 tablet by mouth Every Morning. 90 tablet 3    metFORMIN ER (GLUCOPHAGE-XR) 500 MG 24 hr tablet TAKE 2 TABLETS BY MOUTH DAILY WITH BREAKFAST 180 tablet 3    metoprolol succinate XL (TOPROL-XL) 25 MG 24 hr tablet Take 1 tablet by mouth Daily. 90 tablet 1    rivaroxaban (XARELTO) 20 MG tablet Take 1 tablet by mouth Daily With Dinner.      rosuvastatin (CRESTOR) 20 MG tablet Take 1 tablet by mouth Every Night. 90 tablet 3    sertraline (ZOLOFT) 50 MG tablet Take 1 tablet by mouth Daily. 90 tablet 3     No facility-administered medications prior to visit.       No opioid medication identified on active medication list. I have reviewed chart for other potential  high risk medication/s and harmful drug interactions in the elderly.        Aspirin is on active medication list. Aspirin use is indicated based on review of current medical condition/s. Pros and cons of this therapy have been discussed today. Benefits of this medication outweigh potential harm.  Patient has been encouraged to continue taking this medication.  .      Patient Active Problem List   Diagnosis    Paget's disease of bone    Esophageal reflux    Essential hypertension, benign    Allergic rhinitis    Generalized osteoarthrosis, involving multiple sites    Hyperlipidemia    Hypothyroidism    Herpes zoster    Cellulitis and abscess    Hypertensive emergency    Left facial numbness    Prostate cancer    Hypomagnesemia    Type 2 diabetes mellitus    Left-sided Bell's palsy    Tremor of both hands    Shuffling gait    Orthostatic hypotension    Pain of hand    Vertigo    Osteoarthritis    Anasarca    Moderate malnutrition     Advance Care Planning  ACP discussion was declined by the patient. Patient has an advance directive in EMR which is still valid.     Review of  "Systems      Objective      Vitals:    25 1024   BP: 124/74   Pulse: (!) 44   Resp: 20   Temp: 96.6 °F (35.9 °C)   SpO2: 96%   Weight: 101 kg (221 lb 12.8 oz)   Height: 180.3 cm (71\")   PainSc: 0-No pain     BMI Readings from Last 1 Encounters:   25 30.93 kg/m²   BMI is within normal parameters. No follow-up required.  BMI Readings from Last 1 Encounters:   25 30.93 kg/m²   BMI is above normal parameters. Recommendations include: nutrition counseling    Does the patient have evidence of cognitive impairment? No    Physical Exam  Physical Exam         Results             HEALTH RISK ASSESSMENT    Smoking Status:  Social History     Tobacco Use   Smoking Status Former    Current packs/day: 0.00    Average packs/day: 1 pack/day for 1 year (1.0 ttl pk-yrs)    Types: Cigarettes    Start date: 1999    Quit date: 2000    Years since quittin.3   Smokeless Tobacco Former     Alcohol Consumption:  Social History     Substance and Sexual Activity   Alcohol Use No     Fall Risk Screen:    SKYLER Fall Risk Assessment was completed, and patient is at LOW risk for falls.Assessment completed on:2025    Depression Screenin/17/2025    10:35 AM   PHQ-2/PHQ-9 Depression Screening   Little interest or pleasure in doing things Not at all   Feeling down, depressed, or hopeless Not at all   How difficult have these problems made it for you to do your work, take care of things at home, or get along with other people? Not difficult at all       Health Habits and Functional and Cognitive Screenin/17/2025    10:35 AM   Functional & Cognitive Status   Do you have difficulty preparing food and eating? No   Do you have difficulty bathing yourself, getting dressed or grooming yourself? No   Do you have difficulty using the toilet? No   Do you have difficulty moving around from place to place? No   Do you have trouble with steps or getting out of a bed or a chair? No   Current Diet Well Balanced " Diet   Dental Exam Up to date   Eye Exam Up to date   Exercise (times per week) 0 times per week   Current Exercises Include No Regular Exercise   Do you need help using the phone?  No   Are you deaf or do you have serious difficulty hearing?  No   Do you need help to go to places out of walking distance? No   Do you need help shopping? No   Do you need help preparing meals?  No   Do you need help with housework?  No   Do you need help with laundry? No   Do you need help taking your medications? No   Do you need help managing money? No   Do you ever drive or ride in a car without wearing a seat belt? No   Have you felt unusual stress, anger or loneliness in the last month? No   Who do you live with? Spouse   If you need help, do you have trouble finding someone available to you? No   Have you been bothered in the last four weeks by sexual problems? No   Do you have difficulty concentrating, remembering or making decisions? No       Age-appropriate Screening Schedule:  Refer to the list below for future screening recommendations based on patient's age, sex and/or medical conditions. Orders for these recommended tests are listed in the plan section. The patient has been provided with a written plan.    Health Maintenance   Topic Date Due    URINE MICROALBUMIN-CREATININE RATIO (uACR)  Never done    RSV Vaccine - Adults (1 - 1-dose 75+ series) Never done    DIABETIC EYE EXAM  11/17/2023    HEMOGLOBIN A1C  10/11/2024    ANNUAL WELLNESS VISIT  04/11/2025    COVID-19 Vaccine (7 - 2024-25 season) 05/01/2025 (Originally 4/14/2025)    ZOSTER VACCINE (1 of 2) 05/01/2025 (Originally 7/10/1990)    INFLUENZA VACCINE  07/01/2025    LIPID PANEL  11/25/2025    Pneumococcal Vaccine 50+  Completed              Assessment & Plan     CMS Preventative Services Quick Reference  Risk Factors Identified During Encounter  None Identified  The above risks/problems have been discussed with the patient.  Follow up actions/plans if indicated are  seen below in the Assessment/Plan Section.  Pertinent information has been shared with the patient in the After Visit Summary.    Diagnoses and all orders for this visit:    1. Medicare annual wellness visit, subsequent (Primary)    2. Prostate cancer    3. Type 2 diabetes mellitus with hyperglycemia, without long-term current use of insulin  -     metFORMIN ER (GLUCOPHAGE-XR) 500 MG 24 hr tablet; Take 2 tablets by mouth Daily With Breakfast.  Dispense: 180 tablet; Refill: 3  -     empagliflozin (Jardiance) 10 MG tablet tablet; Take 1 tablet by mouth Daily.  Dispense: 90 tablet; Refill: 3    4. Hyperlipidemia, unspecified hyperlipidemia type  -     rosuvastatin (CRESTOR) 20 MG tablet; Take 1 tablet by mouth Every Night.  Dispense: 90 tablet; Refill: 3    5. Essential hypertension, benign  -     metoprolol succinate XL (TOPROL-XL) 25 MG 24 hr tablet; Take 1 tablet by mouth Daily.  Dispense: 90 tablet; Refill: 3    6. Acquired hypothyroidism    7. Gastroesophageal reflux disease, unspecified whether esophagitis present    8. Generalized osteoarthrosis, involving multiple sites    9. Paget's disease of bone    10. Tremor of both hands    11. Anxiety  -     sertraline (ZOLOFT) 50 MG tablet; Take 1 tablet by mouth Daily.  Dispense: 90 tablet; Refill: 3    12. Other specified hypothyroidism  -     levothyroxine (SYNTHROID, LEVOTHROID) 150 MCG tablet; Take 1 tablet by mouth Every Morning.  Dispense: 90 tablet; Refill: 3    13. Paroxysmal atrial fibrillation    Other orders  -     rivaroxaban (XARELTO) 20 MG tablet; Take 1 tablet by mouth Daily With Dinner.  Dispense: 90 tablet; Refill: 3      Assessment & Plan      Follow Up:  No follow-ups on file.     An After Visit Summary and PPPS were given to the patient.

## 2025-08-04 RX ORDER — SACUBITRIL AND VALSARTAN 49; 51 MG/1; MG/1
1 TABLET, FILM COATED ORAL EVERY 12 HOURS SCHEDULED
Qty: 60 TABLET | Refills: 11 | Status: SHIPPED | OUTPATIENT
Start: 2025-08-04